# Patient Record
Sex: MALE | Race: WHITE | Employment: OTHER | URBAN - METROPOLITAN AREA
[De-identification: names, ages, dates, MRNs, and addresses within clinical notes are randomized per-mention and may not be internally consistent; named-entity substitution may affect disease eponyms.]

---

## 2017-01-16 ENCOUNTER — OFFICE VISIT (OUTPATIENT)
Dept: PULMONOLOGY | Age: 77
End: 2017-01-16

## 2017-01-16 VITALS
HEART RATE: 70 BPM | DIASTOLIC BLOOD PRESSURE: 80 MMHG | BODY MASS INDEX: 29.71 KG/M2 | HEIGHT: 64 IN | SYSTOLIC BLOOD PRESSURE: 120 MMHG | RESPIRATION RATE: 20 BRPM | OXYGEN SATURATION: 97 % | WEIGHT: 174 LBS

## 2017-01-16 DIAGNOSIS — J44.9 COPD, MILD (HCC): ICD-10-CM

## 2017-01-16 DIAGNOSIS — J45.30 MILD PERSISTENT ASTHMA WITHOUT COMPLICATION: Primary | ICD-10-CM

## 2017-01-16 PROCEDURE — 99213 OFFICE O/P EST LOW 20 MIN: CPT | Performed by: INTERNAL MEDICINE

## 2017-02-20 ENCOUNTER — EPISODE CHANGES (OUTPATIENT)
Dept: CASE MANAGEMENT | Age: 77
End: 2017-02-20

## 2017-02-20 PROBLEM — J45.901 ASTHMA EXACERBATION: Status: ACTIVE | Noted: 2017-02-20

## 2017-03-13 ENCOUNTER — OFFICE VISIT (OUTPATIENT)
Dept: PULMONOLOGY | Age: 77
End: 2017-03-13

## 2017-03-13 VITALS
RESPIRATION RATE: 20 BRPM | HEART RATE: 66 BPM | SYSTOLIC BLOOD PRESSURE: 126 MMHG | DIASTOLIC BLOOD PRESSURE: 68 MMHG | HEIGHT: 64 IN | BODY MASS INDEX: 29.02 KG/M2 | OXYGEN SATURATION: 93 % | WEIGHT: 170 LBS

## 2017-03-13 DIAGNOSIS — J44.9 COPD, MILD (HCC): ICD-10-CM

## 2017-03-13 DIAGNOSIS — J45.30 MILD PERSISTENT ASTHMA WITHOUT COMPLICATION: Primary | ICD-10-CM

## 2017-03-13 PROCEDURE — 99213 OFFICE O/P EST LOW 20 MIN: CPT | Performed by: INTERNAL MEDICINE

## 2017-03-13 RX ORDER — PYRIDOXINE HCL (VITAMIN B6) 100 MG
TABLET ORAL
Status: ON HOLD | COMMUNITY
End: 2018-11-15

## 2017-03-13 RX ORDER — ACETAMINOPHEN 160 MG
TABLET,DISINTEGRATING ORAL
Status: ON HOLD | COMMUNITY
End: 2018-11-15

## 2017-03-24 ENCOUNTER — HOSPITAL ENCOUNTER (OUTPATIENT)
Dept: GENERAL RADIOLOGY | Age: 77
Discharge: HOME | End: 2017-03-24
Attending: FAMILY MEDICINE | Admitting: FAMILY MEDICINE

## 2017-03-24 ENCOUNTER — HOSPITAL ENCOUNTER (OUTPATIENT)
Dept: CT IMAGING | Age: 77
Discharge: HOME | End: 2017-03-24
Attending: FAMILY MEDICINE | Admitting: FAMILY MEDICINE

## 2017-03-24 DIAGNOSIS — R14.0 ABDOMINAL DISTENSION (GASEOUS): ICD-10-CM

## 2017-03-24 DIAGNOSIS — R14.0 ABDOMINAL BLOATING: ICD-10-CM

## 2017-03-24 DIAGNOSIS — R10.31 ABDOMINAL PAIN, RIGHT LOWER QUADRANT: ICD-10-CM

## 2017-03-24 LAB
ANION GAP SERPL CALCULATED.3IONS-SCNC: 16 MMOL/L (ref 4–16)
BUN BLDV-MCNC: 21 MG/DL (ref 6–23)
CALCIUM SERPL-MCNC: 9 MG/DL (ref 8.3–10.6)
CHLORIDE BLD-SCNC: 97 MMOL/L (ref 99–110)
CO2: 27 MMOL/L (ref 21–32)
CREAT SERPL-MCNC: 1.2 MG/DL (ref 0.9–1.3)
GFR AFRICAN AMERICAN: 24 ML/MIN/1.73M2
GFR AFRICAN AMERICAN: >60 ML/MIN/1.73M2
GFR NON-AFRICAN AMERICAN: 20 ML/MIN/1.73M2
GFR NON-AFRICAN AMERICAN: 58 ML/MIN/1.73M2
GLUCOSE BLD-MCNC: 241 MG/DL (ref 70–140)
POC CREATININE: 3.1 MG/DL (ref 0.9–1.3)
POTASSIUM SERPL-SCNC: 3.6 MMOL/L (ref 3.5–5.1)
SODIUM BLD-SCNC: 140 MMOL/L (ref 135–145)

## 2017-03-29 PROBLEM — K44.9 HIATAL HERNIA: Status: ACTIVE | Noted: 2017-03-29

## 2017-04-12 ENCOUNTER — HOSPITAL ENCOUNTER (OUTPATIENT)
Dept: GENERAL RADIOLOGY | Age: 77
Discharge: HOME | End: 2017-04-12
Attending: SPECIALIST | Admitting: SPECIALIST

## 2017-04-12 DIAGNOSIS — K44.9 HIATAL HERNIA: ICD-10-CM

## 2017-04-12 DIAGNOSIS — K44.9 DIAPHRAGMATIC HERNIA WITHOUT OBSTRUCTION OR GANGRENE: ICD-10-CM

## 2017-04-24 ENCOUNTER — HOSPITAL ENCOUNTER (OUTPATIENT)
Dept: PREADMISSION TESTING | Age: 77
Discharge: HOME | End: 2017-04-24
Attending: SPECIALIST | Admitting: SPECIALIST

## 2017-04-24 VITALS
WEIGHT: 165 LBS | TEMPERATURE: 98.4 F | OXYGEN SATURATION: 95 % | DIASTOLIC BLOOD PRESSURE: 70 MMHG | BODY MASS INDEX: 28.17 KG/M2 | RESPIRATION RATE: 16 BRPM | HEIGHT: 64 IN | SYSTOLIC BLOOD PRESSURE: 121 MMHG | HEART RATE: 68 BPM

## 2017-04-24 ASSESSMENT — PAIN SCALES - GENERAL: PAINLEVEL_OUTOF10: 0

## 2017-04-26 ENCOUNTER — HOSPITAL ENCOUNTER (OUTPATIENT)
Dept: SURGERY | Age: 77
Discharge: HOME | End: 2017-04-26
Attending: SPECIALIST | Admitting: SPECIALIST

## 2017-04-26 VITALS
HEART RATE: 63 BPM | OXYGEN SATURATION: 99 % | WEIGHT: 169 LBS | HEIGHT: 64 IN | SYSTOLIC BLOOD PRESSURE: 117 MMHG | DIASTOLIC BLOOD PRESSURE: 82 MMHG | TEMPERATURE: 97 F | BODY MASS INDEX: 28.85 KG/M2 | RESPIRATION RATE: 17 BRPM

## 2017-04-26 RX ORDER — SODIUM CHLORIDE, SODIUM LACTATE, POTASSIUM CHLORIDE, CALCIUM CHLORIDE 600; 310; 30; 20 MG/100ML; MG/100ML; MG/100ML; MG/100ML
INJECTION, SOLUTION INTRAVENOUS CONTINUOUS
Status: DISCONTINUED | OUTPATIENT
Start: 2017-04-26 | End: 2017-04-27 | Stop reason: HOSPADM

## 2017-04-26 RX ADMIN — SODIUM CHLORIDE, SODIUM LACTATE, POTASSIUM CHLORIDE, CALCIUM CHLORIDE: 600; 310; 30; 20 INJECTION, SOLUTION INTRAVENOUS at 10:01

## 2017-04-26 ASSESSMENT — PAIN SCALES - GENERAL
PAINLEVEL_OUTOF10: 0
PAINLEVEL_OUTOF10: 0

## 2017-04-26 ASSESSMENT — PAIN - FUNCTIONAL ASSESSMENT: PAIN_FUNCTIONAL_ASSESSMENT: 0-10

## 2017-06-05 ENCOUNTER — HOSPITAL ENCOUNTER (OUTPATIENT)
Dept: GENERAL RADIOLOGY | Age: 77
Discharge: HOME | End: 2017-06-05
Attending: FAMILY MEDICINE | Admitting: FAMILY MEDICINE

## 2017-06-05 LAB
ANION GAP SERPL CALCULATED.3IONS-SCNC: 10 MMOL/L (ref 4–16)
BUN BLDV-MCNC: 18 MG/DL (ref 6–23)
CALCIUM SERPL-MCNC: 9.5 MG/DL (ref 8.3–10.6)
CHLORIDE BLD-SCNC: 104 MMOL/L (ref 99–110)
CO2: 30 MMOL/L (ref 21–32)
CREAT SERPL-MCNC: 1 MG/DL (ref 0.9–1.3)
GFR AFRICAN AMERICAN: >60 ML/MIN/1.73M2
GFR NON-AFRICAN AMERICAN: >60 ML/MIN/1.73M2
GLUCOSE BLD-MCNC: 97 MG/DL (ref 70–140)
POTASSIUM SERPL-SCNC: 4.2 MMOL/L (ref 3.5–5.1)
SODIUM BLD-SCNC: 144 MMOL/L (ref 135–145)

## 2017-08-28 ENCOUNTER — HOSPITAL ENCOUNTER (OUTPATIENT)
Dept: GENERAL RADIOLOGY | Age: 77
Discharge: HOME | End: 2017-08-28
Attending: FAMILY MEDICINE | Admitting: FAMILY MEDICINE

## 2017-08-28 LAB
ALBUMIN SERPL-MCNC: 4.1 GM/DL (ref 3.4–5)
ALP BLD-CCNC: 82 IU/L (ref 40–128)
ALT SERPL-CCNC: 37 U/L (ref 10–40)
ANION GAP SERPL CALCULATED.3IONS-SCNC: 9 MMOL/L (ref 4–16)
AST SERPL-CCNC: 39 IU/L (ref 15–37)
BILIRUB SERPL-MCNC: 1.1 MG/DL (ref 0–1)
BUN BLDV-MCNC: 16 MG/DL (ref 6–23)
CALCIUM SERPL-MCNC: 9.1 MG/DL (ref 8.3–10.6)
CHLORIDE BLD-SCNC: 102 MMOL/L (ref 99–110)
CHOLESTEROL: 141 MG/DL
CO2: 31 MMOL/L (ref 21–32)
CREAT SERPL-MCNC: 1.1 MG/DL (ref 0.9–1.3)
GFR AFRICAN AMERICAN: >60 ML/MIN/1.73M2
GFR NON-AFRICAN AMERICAN: >60 ML/MIN/1.73M2
GLUCOSE BLD-MCNC: 90 MG/DL (ref 70–140)
HDLC SERPL-MCNC: 64 MG/DL
LDL CHOLESTEROL DIRECT: 73 MG/DL
POTASSIUM SERPL-SCNC: 4.4 MMOL/L (ref 3.5–5.1)
SODIUM BLD-SCNC: 142 MMOL/L (ref 135–145)
TOTAL PROTEIN: 6.1 GM/DL (ref 6.4–8.2)
TRIGL SERPL-MCNC: 84 MG/DL

## 2017-09-07 ENCOUNTER — HOSPITAL ENCOUNTER (OUTPATIENT)
Dept: NEUROLOGY | Age: 77
Discharge: HOME | End: 2017-09-07
Attending: ORTHOPAEDIC SURGERY | Admitting: ORTHOPAEDIC SURGERY

## 2017-09-14 ENCOUNTER — OFFICE VISIT (OUTPATIENT)
Dept: PULMONOLOGY | Age: 77
End: 2017-09-14

## 2017-09-14 VITALS
DIASTOLIC BLOOD PRESSURE: 84 MMHG | SYSTOLIC BLOOD PRESSURE: 126 MMHG | HEIGHT: 64 IN | HEART RATE: 75 BPM | BODY MASS INDEX: 28.6 KG/M2 | WEIGHT: 167.55 LBS | OXYGEN SATURATION: 98 %

## 2017-09-14 DIAGNOSIS — J44.9 COPD, MILD (HCC): Primary | ICD-10-CM

## 2017-09-14 DIAGNOSIS — J45.30 MILD PERSISTENT ASTHMA WITHOUT COMPLICATION: ICD-10-CM

## 2017-09-14 PROCEDURE — 99213 OFFICE O/P EST LOW 20 MIN: CPT | Performed by: INTERNAL MEDICINE

## 2017-11-16 PROBLEM — K85.90 PANCREATITIS, UNSPECIFIED PANCREATITIS TYPE: Status: ACTIVE | Noted: 2017-11-16

## 2018-01-16 ENCOUNTER — OFFICE VISIT (OUTPATIENT)
Dept: PULMONOLOGY | Age: 78
End: 2018-01-16

## 2018-01-16 VITALS — SYSTOLIC BLOOD PRESSURE: 126 MMHG | DIASTOLIC BLOOD PRESSURE: 72 MMHG | HEART RATE: 72 BPM | OXYGEN SATURATION: 98 %

## 2018-01-16 DIAGNOSIS — J44.1 ACUTE EXACERBATION OF CHRONIC OBSTRUCTIVE PULMONARY DISEASE (COPD) (HCC): Primary | ICD-10-CM

## 2018-01-16 DIAGNOSIS — J44.9 COPD, MILD (HCC): ICD-10-CM

## 2018-01-16 DIAGNOSIS — R91.1 LUNG NODULE SEEN ON IMAGING STUDY: ICD-10-CM

## 2018-01-16 DIAGNOSIS — J45.30 MILD PERSISTENT ASTHMA WITHOUT COMPLICATION: ICD-10-CM

## 2018-01-16 PROCEDURE — 99213 OFFICE O/P EST LOW 20 MIN: CPT | Performed by: INTERNAL MEDICINE

## 2018-01-16 RX ORDER — PREDNISONE 1 MG/1
TABLET ORAL
Qty: 38 TABLET | Refills: 0 | Status: ON HOLD | OUTPATIENT
Start: 2018-01-16 | End: 2018-11-15

## 2018-01-16 RX ORDER — DOXYCYCLINE HYCLATE 100 MG
100 TABLET ORAL 2 TIMES DAILY
Qty: 14 TABLET | Refills: 0 | Status: SHIPPED | OUTPATIENT
Start: 2018-01-16 | End: 2018-01-23

## 2018-02-22 ENCOUNTER — NURSE ONLY (OUTPATIENT)
Dept: PULMONOLOGY | Age: 78
End: 2018-02-22

## 2018-02-22 DIAGNOSIS — J45.30 MILD PERSISTENT ASTHMA WITHOUT COMPLICATION: ICD-10-CM

## 2018-02-22 DIAGNOSIS — J44.9 COPD, MILD (HCC): ICD-10-CM

## 2018-02-22 LAB
DLCO %PRED: NORMAL
DLCO PRE: NORMAL
FEF 25-75%-POST: 1.04
FEF 25-75%-PRE: 1.27
FEV1-POST: 1.16
FEV1-PRE: 1.52
FEV1/FVC-POST: 78.4
FEV1/FVC-PRE: 77
FVC-POST: 1.48
FVC-PRE: 1.98
MEP: NORMAL
MIP: NORMAL
TLC %PRED: NORMAL
TLC PRE: NORMAL

## 2018-02-22 PROCEDURE — 94060 EVALUATION OF WHEEZING: CPT | Performed by: INTERNAL MEDICINE

## 2018-02-22 ASSESSMENT — PULMONARY FUNCTION TESTS
FEV1_PRE: 1.52
FEV1/FVC_PRE: 77.0
FVC_POST: 1.48
FEV1/FVC_POST: 78.4
FVC_PRE: 1.98
FEV1_POST: 1.16

## 2018-06-06 ENCOUNTER — HOSPITAL ENCOUNTER (OUTPATIENT)
Dept: ULTRASOUND IMAGING | Age: 78
Discharge: HOME | End: 2018-06-06
Attending: INTERNAL MEDICINE | Admitting: INTERNAL MEDICINE

## 2018-06-06 DIAGNOSIS — I65.23 CAROTID STENOSIS, BILATERAL: ICD-10-CM

## 2018-06-06 DIAGNOSIS — R42 DIZZINESS: ICD-10-CM

## 2018-07-17 ENCOUNTER — OFFICE VISIT (OUTPATIENT)
Dept: PULMONOLOGY | Age: 78
End: 2018-07-17

## 2018-07-17 VITALS
DIASTOLIC BLOOD PRESSURE: 78 MMHG | SYSTOLIC BLOOD PRESSURE: 122 MMHG | BODY MASS INDEX: 29.19 KG/M2 | HEIGHT: 64 IN | WEIGHT: 171 LBS | HEART RATE: 61 BPM | OXYGEN SATURATION: 99 %

## 2018-07-17 DIAGNOSIS — J44.9 COPD, MILD (HCC): ICD-10-CM

## 2018-07-17 DIAGNOSIS — R91.1 LUNG NODULE SEEN ON IMAGING STUDY: ICD-10-CM

## 2018-07-17 DIAGNOSIS — R06.02 SOB (SHORTNESS OF BREATH): ICD-10-CM

## 2018-07-17 DIAGNOSIS — J45.30 MILD PERSISTENT ASTHMA WITHOUT COMPLICATION: Primary | ICD-10-CM

## 2018-07-17 PROCEDURE — 99213 OFFICE O/P EST LOW 20 MIN: CPT | Performed by: INTERNAL MEDICINE

## 2018-07-17 RX ORDER — MOMETASONE FUROATE 50 UG/1
1 SPRAY, METERED NASAL
COMMUNITY
End: 2018-07-17

## 2018-07-17 RX ORDER — TAMSULOSIN HYDROCHLORIDE 0.4 MG/1
0.4 CAPSULE ORAL
Status: ON HOLD | COMMUNITY
End: 2018-11-15

## 2018-07-17 RX ORDER — ALBUTEROL SULFATE 90 UG/1
2 AEROSOL, METERED RESPIRATORY (INHALATION)
COMMUNITY
End: 2018-07-17

## 2018-07-17 NOTE — PROGRESS NOTES
nodule. This dictation was performed with a verbal recognition program and it was checked for errors. It is possible that there are still dictated errors within this office note. Any errors should be brought immediately to my attention for correction. All efforts were made to ensure that this office note is accurate.

## 2018-07-25 ENCOUNTER — HOSPITAL ENCOUNTER (OUTPATIENT)
Dept: CT IMAGING | Age: 78
Discharge: HOME | End: 2018-07-25
Attending: INTERNAL MEDICINE | Admitting: INTERNAL MEDICINE

## 2018-07-25 DIAGNOSIS — R91.8 LUNG NODULES: ICD-10-CM

## 2018-07-25 DIAGNOSIS — R91.1 SOLITARY PULMONARY NODULE: ICD-10-CM

## 2018-07-31 ENCOUNTER — TELEPHONE (OUTPATIENT)
Dept: PULMONOLOGY | Age: 78
End: 2018-07-31

## 2018-08-01 ENCOUNTER — HOSPITAL ENCOUNTER (OUTPATIENT)
Dept: GENERAL RADIOLOGY | Age: 78
Discharge: HOME | End: 2018-08-01
Attending: FAMILY MEDICINE | Admitting: FAMILY MEDICINE

## 2018-08-01 LAB
ALBUMIN SERPL-MCNC: 4.1 GM/DL (ref 3.4–5)
ALP BLD-CCNC: 67 IU/L (ref 40–129)
ALT SERPL-CCNC: 26 U/L (ref 10–40)
ANION GAP SERPL CALCULATED.3IONS-SCNC: 13 MMOL/L (ref 4–16)
AST SERPL-CCNC: 31 IU/L (ref 15–37)
BASOPHILS ABSOLUTE: 0 K/CU MM
BASOPHILS RELATIVE PERCENT: 0.6 % (ref 0–1)
BILIRUB SERPL-MCNC: 0.7 MG/DL (ref 0–1)
BUN BLDV-MCNC: 16 MG/DL (ref 6–23)
CALCIUM SERPL-MCNC: 8.7 MG/DL (ref 8.3–10.6)
CHLORIDE BLD-SCNC: 101 MMOL/L (ref 99–110)
CHOLESTEROL: 134 MG/DL
CO2: 27 MMOL/L (ref 21–32)
CREAT SERPL-MCNC: 0.9 MG/DL (ref 0.9–1.3)
DIFFERENTIAL TYPE: ABNORMAL
EOSINOPHILS ABSOLUTE: 0.2 K/CU MM
EOSINOPHILS RELATIVE PERCENT: 3.4 % (ref 0–3)
FOLATE: >20 NG/ML (ref 3.1–17.5)
GFR AFRICAN AMERICAN: >60 ML/MIN/1.73M2
GFR NON-AFRICAN AMERICAN: >60 ML/MIN/1.73M2
GLUCOSE BLD-MCNC: 97 MG/DL (ref 70–99)
HCT VFR BLD CALC: 45.7 % (ref 42–52)
HDLC SERPL-MCNC: 58 MG/DL
HEMOGLOBIN: 15.1 GM/DL (ref 13.5–18)
IMMATURE NEUTROPHIL %: 0.3 % (ref 0–0.43)
LDL CHOLESTEROL DIRECT: 72 MG/DL
LYMPHOCYTES ABSOLUTE: 1.8 K/CU MM
LYMPHOCYTES RELATIVE PERCENT: 28.4 % (ref 24–44)
MCH RBC QN AUTO: 30.8 PG (ref 27–31)
MCHC RBC AUTO-ENTMCNC: 33 % (ref 32–36)
MCV RBC AUTO: 93.3 FL (ref 78–100)
MONOCYTES ABSOLUTE: 0.8 K/CU MM
MONOCYTES RELATIVE PERCENT: 12.2 % (ref 0–4)
NUCLEATED RBC %: 0 %
PDW BLD-RTO: 12.6 % (ref 11.7–14.9)
PLATELET # BLD: 164 K/CU MM (ref 140–440)
PMV BLD AUTO: 10.1 FL (ref 7.5–11.1)
POTASSIUM SERPL-SCNC: 4.2 MMOL/L (ref 3.5–5.1)
RBC # BLD: 4.9 M/CU MM (ref 4.6–6.2)
SEGMENTED NEUTROPHILS ABSOLUTE COUNT: 3.5 K/CU MM
SEGMENTED NEUTROPHILS RELATIVE PERCENT: 55.1 % (ref 36–66)
SODIUM BLD-SCNC: 141 MMOL/L (ref 135–145)
TOTAL IMMATURE NEUTOROPHIL: 0.02 K/CU MM
TOTAL NUCLEATED RBC: 0 K/CU MM
TOTAL PROTEIN: 6 GM/DL (ref 6.4–8.2)
TRIGL SERPL-MCNC: 94 MG/DL
TSH HIGH SENSITIVITY: 1.55 UIU/ML (ref 0.27–4.2)
VITAMIN B-12: 689.9 PG/ML (ref 211–911)
WBC # BLD: 6.4 K/CU MM (ref 4–10.5)

## 2018-08-06 ENCOUNTER — TELEPHONE (OUTPATIENT)
Dept: PULMONOLOGY | Age: 78
End: 2018-08-06

## 2018-11-14 ENCOUNTER — HOSPITAL ENCOUNTER (OUTPATIENT)
Age: 78
Setting detail: OBSERVATION
Discharge: HOME | End: 2018-11-16
Attending: EMERGENCY MEDICINE | Admitting: FAMILY MEDICINE
Payer: COMMERCIAL

## 2018-11-14 ENCOUNTER — APPOINTMENT (OUTPATIENT)
Dept: GENERAL RADIOLOGY | Age: 78
End: 2018-11-14
Payer: COMMERCIAL

## 2018-11-14 ENCOUNTER — APPOINTMENT (OUTPATIENT)
Dept: CT IMAGING | Age: 78
End: 2018-11-14
Payer: COMMERCIAL

## 2018-11-14 DIAGNOSIS — R53.1 GENERAL WEAKNESS: Primary | ICD-10-CM

## 2018-11-14 DIAGNOSIS — R40.4 TRANSIENT ALTERATION OF AWARENESS: ICD-10-CM

## 2018-11-14 LAB
ALBUMIN SERPL-MCNC: 4 GM/DL (ref 3.4–5)
ALP BLD-CCNC: 62 IU/L (ref 40–129)
ALT SERPL-CCNC: 32 U/L (ref 10–40)
ANION GAP SERPL CALCULATED.3IONS-SCNC: 9 MMOL/L (ref 4–16)
AST SERPL-CCNC: 38 IU/L (ref 15–37)
BASOPHILS ABSOLUTE: 0.1 K/CU MM
BASOPHILS RELATIVE PERCENT: 0.8 % (ref 0–1)
BILIRUB SERPL-MCNC: 1.3 MG/DL (ref 0–1)
BUN BLDV-MCNC: 14 MG/DL (ref 6–23)
CALCIUM SERPL-MCNC: 8.8 MG/DL (ref 8.3–10.6)
CHLORIDE BLD-SCNC: 102 MMOL/L (ref 99–110)
CO2: 26 MMOL/L (ref 21–32)
CREAT SERPL-MCNC: 0.9 MG/DL (ref 0.9–1.3)
DIFFERENTIAL TYPE: ABNORMAL
EOSINOPHILS ABSOLUTE: 0.2 K/CU MM
EOSINOPHILS RELATIVE PERCENT: 2.7 % (ref 0–3)
GFR AFRICAN AMERICAN: >60 ML/MIN/1.73M2
GFR NON-AFRICAN AMERICAN: >60 ML/MIN/1.73M2
GLUCOSE BLD-MCNC: 87 MG/DL (ref 70–99)
HCT VFR BLD CALC: 52 % (ref 42–52)
HEMOGLOBIN: 15.8 GM/DL (ref 13.5–18)
IMMATURE NEUTROPHIL %: 0.3 % (ref 0–0.43)
INR BLD: 1.09 INDEX
LYMPHOCYTES ABSOLUTE: 2.9 K/CU MM
LYMPHOCYTES RELATIVE PERCENT: 37.3 % (ref 24–44)
MCH RBC QN AUTO: 30.6 PG (ref 27–31)
MCHC RBC AUTO-ENTMCNC: 30.4 % (ref 32–36)
MCV RBC AUTO: 100.8 FL (ref 78–100)
MONOCYTES ABSOLUTE: 1 K/CU MM
MONOCYTES RELATIVE PERCENT: 13.3 % (ref 0–4)
NUCLEATED RBC %: 0 %
PDW BLD-RTO: 12.8 % (ref 11.7–14.9)
PLATELET # BLD: 161 K/CU MM (ref 140–440)
PMV BLD AUTO: 9.1 FL (ref 7.5–11.1)
POTASSIUM SERPL-SCNC: 4 MMOL/L (ref 3.5–5.1)
PROTHROMBIN TIME: 12.4 SECONDS (ref 9.12–12.5)
RBC # BLD: 5.16 M/CU MM (ref 4.6–6.2)
SEGMENTED NEUTROPHILS ABSOLUTE COUNT: 3.5 K/CU MM
SEGMENTED NEUTROPHILS RELATIVE PERCENT: 45.6 % (ref 36–66)
SODIUM BLD-SCNC: 137 MMOL/L (ref 135–145)
TOTAL IMMATURE NEUTOROPHIL: 0.02 K/CU MM
TOTAL NUCLEATED RBC: 0 K/CU MM
TOTAL PROTEIN: 6.5 GM/DL (ref 6.4–8.2)
TROPONIN T: <0.01 NG/ML
WBC # BLD: 7.7 K/CU MM (ref 4–10.5)

## 2018-11-14 PROCEDURE — 84484 ASSAY OF TROPONIN QUANT: CPT

## 2018-11-14 PROCEDURE — 71045 X-RAY EXAM CHEST 1 VIEW: CPT

## 2018-11-14 PROCEDURE — 93005 ELECTROCARDIOGRAM TRACING: CPT | Performed by: EMERGENCY MEDICINE

## 2018-11-14 PROCEDURE — 99285 EMERGENCY DEPT VISIT HI MDM: CPT

## 2018-11-14 PROCEDURE — 70450 CT HEAD/BRAIN W/O DYE: CPT

## 2018-11-14 PROCEDURE — 80053 COMPREHEN METABOLIC PANEL: CPT

## 2018-11-14 PROCEDURE — 85610 PROTHROMBIN TIME: CPT

## 2018-11-14 PROCEDURE — 85025 COMPLETE CBC W/AUTO DIFF WBC: CPT

## 2018-11-14 RX ORDER — ALPRAZOLAM 0.25 MG/1
0.25 TABLET ORAL NIGHTLY PRN
Status: ON HOLD | COMMUNITY
End: 2019-03-05 | Stop reason: SDUPTHER

## 2018-11-15 ENCOUNTER — APPOINTMENT (OUTPATIENT)
Dept: MRI IMAGING | Age: 78
End: 2018-11-15
Payer: COMMERCIAL

## 2018-11-15 PROBLEM — G45.9 TIA (TRANSIENT ISCHEMIC ATTACK): Status: ACTIVE | Noted: 2018-11-15

## 2018-11-15 PROCEDURE — 97161 PT EVAL LOW COMPLEX 20 MIN: CPT

## 2018-11-15 PROCEDURE — G0378 HOSPITAL OBSERVATION PER HR: HCPCS

## 2018-11-15 PROCEDURE — 6370000000 HC RX 637 (ALT 250 FOR IP): Performed by: FAMILY MEDICINE

## 2018-11-15 PROCEDURE — G8979 MOBILITY GOAL STATUS: HCPCS

## 2018-11-15 PROCEDURE — 2580000003 HC RX 258: Performed by: FAMILY MEDICINE

## 2018-11-15 PROCEDURE — 97530 THERAPEUTIC ACTIVITIES: CPT

## 2018-11-15 PROCEDURE — 99205 OFFICE O/P NEW HI 60 MIN: CPT | Performed by: PSYCHIATRY & NEUROLOGY

## 2018-11-15 PROCEDURE — 93010 ELECTROCARDIOGRAM REPORT: CPT | Performed by: INTERNAL MEDICINE

## 2018-11-15 PROCEDURE — 95819 EEG AWAKE AND ASLEEP: CPT

## 2018-11-15 PROCEDURE — 2580000003 HC RX 258: Performed by: NURSE PRACTITIONER

## 2018-11-15 PROCEDURE — 94640 AIRWAY INHALATION TREATMENT: CPT

## 2018-11-15 PROCEDURE — 6370000000 HC RX 637 (ALT 250 FOR IP): Performed by: NURSE PRACTITIONER

## 2018-11-15 PROCEDURE — G8978 MOBILITY CURRENT STATUS: HCPCS

## 2018-11-15 PROCEDURE — 6360000002 HC RX W HCPCS: Performed by: NURSE PRACTITIONER

## 2018-11-15 PROCEDURE — 96374 THER/PROPH/DIAG INJ IV PUSH: CPT

## 2018-11-15 PROCEDURE — 70551 MRI BRAIN STEM W/O DYE: CPT

## 2018-11-15 RX ORDER — SODIUM CHLORIDE 0.9 % (FLUSH) 0.9 %
10 SYRINGE (ML) INJECTION EVERY 12 HOURS SCHEDULED
Status: DISCONTINUED | OUTPATIENT
Start: 2018-11-15 | End: 2018-11-16 | Stop reason: HOSPADM

## 2018-11-15 RX ORDER — FLUTICASONE FUROATE AND VILANTEROL 100; 25 UG/1; UG/1
1 POWDER RESPIRATORY (INHALATION) DAILY
Status: DISCONTINUED | OUTPATIENT
Start: 2018-11-15 | End: 2018-11-15 | Stop reason: CLARIF

## 2018-11-15 RX ORDER — ALPRAZOLAM 0.25 MG/1
0.25 TABLET ORAL NIGHTLY PRN
Status: DISCONTINUED | OUTPATIENT
Start: 2018-11-15 | End: 2018-11-16 | Stop reason: HOSPADM

## 2018-11-15 RX ORDER — FEXOFENADINE HCL 180 MG/1
180 TABLET ORAL DAILY
Status: DISCONTINUED | OUTPATIENT
Start: 2018-11-15 | End: 2018-11-15 | Stop reason: CLARIF

## 2018-11-15 RX ORDER — PANTOPRAZOLE SODIUM 40 MG/1
40 TABLET, DELAYED RELEASE ORAL DAILY
Status: DISCONTINUED | OUTPATIENT
Start: 2018-11-15 | End: 2018-11-16 | Stop reason: HOSPADM

## 2018-11-15 RX ORDER — SODIUM CHLORIDE 0.9 % (FLUSH) 0.9 %
10 SYRINGE (ML) INJECTION PRN
Status: DISCONTINUED | OUTPATIENT
Start: 2018-11-15 | End: 2018-11-16 | Stop reason: HOSPADM

## 2018-11-15 RX ORDER — ACETAMINOPHEN 325 MG/1
650 TABLET ORAL EVERY 4 HOURS PRN
Status: DISCONTINUED | OUTPATIENT
Start: 2018-11-15 | End: 2018-11-16 | Stop reason: HOSPADM

## 2018-11-15 RX ORDER — ALBUTEROL SULFATE 90 UG/1
2 AEROSOL, METERED RESPIRATORY (INHALATION) EVERY 4 HOURS PRN
Status: DISCONTINUED | OUTPATIENT
Start: 2018-11-15 | End: 2018-11-16 | Stop reason: HOSPADM

## 2018-11-15 RX ORDER — ASPIRIN 81 MG/1
81 TABLET, CHEWABLE ORAL DAILY
Status: DISCONTINUED | OUTPATIENT
Start: 2018-11-15 | End: 2018-11-16 | Stop reason: HOSPADM

## 2018-11-15 RX ORDER — MONTELUKAST SODIUM 10 MG/1
10 TABLET ORAL NIGHTLY
Status: DISCONTINUED | OUTPATIENT
Start: 2018-11-15 | End: 2018-11-16 | Stop reason: HOSPADM

## 2018-11-15 RX ORDER — ONDANSETRON 2 MG/ML
4 INJECTION INTRAMUSCULAR; INTRAVENOUS EVERY 6 HOURS PRN
Status: DISCONTINUED | OUTPATIENT
Start: 2018-11-15 | End: 2018-11-16 | Stop reason: HOSPADM

## 2018-11-15 RX ORDER — CETIRIZINE HYDROCHLORIDE 10 MG/1
10 TABLET ORAL DAILY
Status: DISCONTINUED | OUTPATIENT
Start: 2018-11-15 | End: 2018-11-16 | Stop reason: HOSPADM

## 2018-11-15 RX ORDER — LATANOPROST 50 UG/ML
1 SOLUTION/ DROPS OPHTHALMIC NIGHTLY
Status: DISCONTINUED | OUTPATIENT
Start: 2018-11-15 | End: 2018-11-16 | Stop reason: HOSPADM

## 2018-11-15 RX ORDER — ATORVASTATIN CALCIUM 40 MG/1
40 TABLET, FILM COATED ORAL DAILY
Status: DISCONTINUED | OUTPATIENT
Start: 2018-11-15 | End: 2018-11-16 | Stop reason: HOSPADM

## 2018-11-15 RX ORDER — LEVETIRACETAM 500 MG/1
500 TABLET ORAL 2 TIMES DAILY
Status: DISCONTINUED | OUTPATIENT
Start: 2018-11-15 | End: 2018-11-16 | Stop reason: HOSPADM

## 2018-11-15 RX ORDER — FUROSEMIDE 40 MG/1
40 TABLET ORAL DAILY
Status: DISCONTINUED | OUTPATIENT
Start: 2018-11-15 | End: 2018-11-16 | Stop reason: HOSPADM

## 2018-11-15 RX ADMIN — PANTOPRAZOLE SODIUM 40 MG: 40 TABLET, DELAYED RELEASE ORAL at 06:06

## 2018-11-15 RX ADMIN — ACETAMINOPHEN 650 MG: 325 TABLET ORAL at 02:27

## 2018-11-15 RX ADMIN — LEVETIRACETAM 1500 MG: 500 INJECTION, SOLUTION, CONCENTRATE INTRAVENOUS at 16:00

## 2018-11-15 RX ADMIN — Medication 2 PUFF: at 20:17

## 2018-11-15 RX ADMIN — CETIRIZINE HYDROCHLORIDE 10 MG: 10 TABLET, FILM COATED ORAL at 10:38

## 2018-11-15 RX ADMIN — ATORVASTATIN CALCIUM 40 MG: 40 TABLET, FILM COATED ORAL at 22:10

## 2018-11-15 RX ADMIN — MONTELUKAST SODIUM 10 MG: 10 TABLET, COATED ORAL at 22:10

## 2018-11-15 RX ADMIN — SODIUM CHLORIDE, PRESERVATIVE FREE 10 ML: 5 INJECTION INTRAVENOUS at 22:10

## 2018-11-15 RX ADMIN — ALPRAZOLAM 0.25 MG: 0.25 TABLET ORAL at 22:13

## 2018-11-15 RX ADMIN — ASPIRIN 81 MG 81 MG: 81 TABLET ORAL at 10:39

## 2018-11-15 RX ADMIN — ALPRAZOLAM 0.25 MG: 0.25 TABLET ORAL at 02:27

## 2018-11-15 RX ADMIN — APIXABAN 2.5 MG: 2.5 TABLET, FILM COATED ORAL at 22:10

## 2018-11-15 RX ADMIN — LEVETIRACETAM 500 MG: 500 TABLET, FILM COATED ORAL at 22:10

## 2018-11-15 RX ADMIN — SODIUM CHLORIDE, PRESERVATIVE FREE 10 ML: 5 INJECTION INTRAVENOUS at 10:38

## 2018-11-15 RX ADMIN — FUROSEMIDE 40 MG: 40 TABLET ORAL at 10:38

## 2018-11-15 RX ADMIN — LATANOPROST 1 DROP: 50 SOLUTION/ DROPS OPHTHALMIC at 22:10

## 2018-11-15 RX ADMIN — APIXABAN 2.5 MG: 2.5 TABLET, FILM COATED ORAL at 02:27

## 2018-11-15 RX ADMIN — MONTELUKAST SODIUM 10 MG: 10 TABLET, COATED ORAL at 02:27

## 2018-11-15 RX ADMIN — APIXABAN 2.5 MG: 2.5 TABLET, FILM COATED ORAL at 10:39

## 2018-11-15 ASSESSMENT — PAIN SCALES - GENERAL
PAINLEVEL_OUTOF10: 0
PAINLEVEL_OUTOF10: 0
PAINLEVEL_OUTOF10: 5
PAINLEVEL_OUTOF10: 5

## 2018-11-15 ASSESSMENT — PAIN DESCRIPTION - PAIN TYPE: TYPE: ACUTE PAIN

## 2018-11-15 ASSESSMENT — PAIN DESCRIPTION - LOCATION: LOCATION: HEAD

## 2018-11-15 NOTE — CARE COORDINATION
Patient discharged home on 11/16/2018  Discharge time out complete:   PHILLIP Sanders 4     Needs addressed:     [x] Yes [] No       Do you have any medical equipment at home? [] Yes [x] No   Do you have any other medical equipment needs? [] Yes [x] No   Do you use  [] Home O2  [] BIPAP/CPAP  [] Med Neb              [x] None of the above    All functioning correctly   [] Yes [] No   Do you have all supplies needed   [] Yes [] No   Which company supplies your equipment-    Do you have [facility]:   [] Yes [x] No   Who?:    Discharge plan reviewed:    [x] Yes [] No   Any Needs:   [] Yes [x] No     Medications:  Educated on medications:   [x] Yes [] No   Medications filled by our pharmacy:   [] Yes [x] No   Changes explained:   [x] Yes [] No   Medication Reconciliation complete:   [x] Yes [] No   Do you have all your existing medications:    [x] Yes [] No   Discharge plan and destination agreed upon:   [x] Yes [] No        F/U made: Date and time placed in AVS and Patient aware   [x] Yes [] No   Do you have transportation to these appointments available?:   [x] Yes [] No        CN met with patient at 1045 on 11/15/2018, sitting in chair. States feels better. Used to see Dr Valentino Hardin back in 2013 when he had his strokes and Dr Valentino Hardin told him he was having TIA or mini strokes all the time. Will continue to FU    STROKE INITIAL ASSESSMENT      What symptoms brought you in to the hospital? Patient came in with per family heard them cry out but not yelling, when she checked on him he had a glazed look in his eyes. Family member then went to bed and checked on him later in morning around 10 AM since he did not get up. Patient was weak, difficulty swallowing and speaking. Patient recalls knowing what was going on but couldn't speak. Slept for 10 hours then got up and went to chair and was unable to eat. Then started to drink fluids.  Was able to walk to kitchen and eat soup then decided to come in to the hospital. Has a history of 2 strokes. Last Known Well:11/14 0200    Do you have a Neurologist? Loretta Khoury  Name:    Where do you live:       [x] Home       [] Independent Living     [] Assisted Living                  [] [address]   [] Homeless/Homeless Shelter   [] Group Home    Primary Physician:  [] [facility]   []Sia   [x] Dr. Lukasz Damon   [] [facility]   [] PA/NP    Pharmacy:        [] [facility]        [] [facility]        [] [facility] N[facility]   [x] [facility]      [] Joleen Rosado    [] [facility]   [] Joshha Munch              [] Nursing Home    [] [place] [id number]   [] [facility]   [] [facility]    [] [facility]   [] [facility]   [] [employer] [facility]   [] Other:          Meds to Beds:   [x] Yes  [] No  Home Care:         [] Yes, Name:       [x] No            SNF:  [] Yes, Name:   [x] No      Do you take a blood thinner? Aspirin, Eliquis  Do you take a statins? Lipitor    Are you out of any of your  home medications? [] Yes   [x] No  Can you pay for your meds? [x] Yes   [] No  Do you have transportation:            [x] Yes   [] No          What time do you prefer your appointments:  [] AM   [x] PM  [] Anytime    Goals for this admission:   Patient wants to: go home with friend    Steps to meet goal:   1. Medications   2. Work with therapy   3.        CN provided education to patient on reducing risk for stroke/TIA by modifying /controlling risk factors:. AFib, Stroke, HLD,HTN, PVD, . Family History of Heart Disease. .. Instructed to take the medications as prescribed and dont stop unless ordered by a physician. Educated on  need to follow-up with physician after discharge . Discussed benefits of eating a healthy diet, regularly exercising.        Copy of educational materials given to the patient/caregiver to take home, reviewed signs and symptoms of stroke, including sudden numbness, dizziness, or loss of coordination or balance; weakness on one side of the body, sudden confusion, difficulty speaking, understanding or swallowing, sudden loss of vision, or severe, sudden headache. Emphasized the need to call 911 immediately if they are experiencing signs and symptoms of stroke. FAST education provided, FAST magnet given to patient. All education with teachback and questions answered     CN Contact information card given to patient/caregiver    Dysphagia screen done prior to any oral intake (including meds)                                  Yes/No    Date & Time of screening-11/15 0016  Date & time of first medication-11/15 0227  Did the pt fail the dysphagia screen? If yes,call the physician for SLP order, make the patient NPO and change oral medications to other routes  No    VTE Prophylaxis given by day 2 of admission ( day 1 starts on adm date,this excludes obs status and ED)  Yes  If VTE not ordered, did the physician chart BOTH a pharmacological and mechanical reasons ( in context to VTE) why it wan not ordered? NA  Was the patient given an antithrombotic by end of day 2? (day 1 starts on patients arrival date)  Yes  If the pt did not have an order for antithrombotic by day 2, did the physician document why the pt did not receive it? ( NPO status and an allergy to an antithrombotic are not acceptable reasons)  NA    Did the pt receive education on how to call 911 and documented that handout was given to pt/caregiver? Yes  Did the pt receive education on stroke symptoms and documented that handout was given to pt/caregiver? Yes  Did the patient receive education on risk factors for stroke and documented that handout was given to pt/caregiver? Yes  Does the pt have the personalized stroke factors checklist added the the AVS with appropriate risk factors checked? Yes    Did the pt receive a list of medications that are DC'D on the AVS ?  Yes  Was the pt assessed by PT/OT or SLP?  If not is there a physician note that pt is back to baseline,no PT/OT/SLP eval needed or if there is an outpt referral. PT/OT phone

## 2018-11-15 NOTE — CONSULTS
hemodynamically significant coronary artery disease    H/O cardiovascular stress test 1/14/2016    lexiscan-normal,EF70%    H/O echocardiogram 02/12/15    EF 60% Mildly hypertrophied LV and LV systolic function. Mild MR & TR. Atrial septal occluder device is seen with is fuctioning normally.  H/O echocardiogram 1/14/2016    EF 60% aaortic root mildly dilated with dimension of 4.3    H/O transesophageal echocardiography (HARLAN) for monitoring 5/22/2014    normal septal occluder     Hiatal hernia     planning to see Dr Nava Sees about this    History of 24 hour EKG monitoring 5/10/14    Sinus rhythm.  History of cardiovascular stress test 12/16/2013 12/2013-([facility])-Probably normal Regadenoson/Exercise with Tc-99 sestamibi. No ischemia noted. Small fixed defect in anteroseptuma and a second small fixed defect in inferolateral wall, most consistent with attenuation artifact. Normal LV size. Global LVSF normal and normal wall motion;    History of echocardiogram 11/11/2013 11/2013-([facility])-Interatrial septal occluder device is visualized. Normal biventricular systolic function; LVEF - 60-65%. Mild degenerative valvular changes; Mild AI, Mild MR, trace TR, trace PI. No pericardial effusion;    History of heart surgery 11/13    PFO closure by Amplatzer 25mm Cribriform device to intra-atrial septum--> [employer])    History of kidney stones     tx with surgery for this in the past    History of nuclear stress test 2/16/15    EF 70%. WNL    Hx of Doppler ultrasound 5/7/2014    Carotid- right and left normal    Hx of Doppler ultrasound 6/30/14    Right groin US: Negative for pseudoaneurysm findings.     Hx of echocardiogram 3/31/2014    mild mitral and tricusid regurg, mildly hypertrophied left ventricle    Hx of motion sickness     and claustrophobia    HX OTHER MEDICAL 2/18/2014-2/24/2014 2/2014-([facility] 7 day Event Monitor- Five

## 2018-11-15 NOTE — H&P
180 tablet, Rfl: 3    acetaminophen 650 MG TABS, Take 650 mg by mouth every 4 hours as needed, Disp: 120 tablet, Rfl: 3    montelukast (SINGULAIR) 10 MG tablet, Take 10 mg by mouth nightly., Disp: , Rfl:     tamsulosin (FLOMAX) 0.4 MG capsule, Take 0.4 mg by mouth, Disp: , Rfl:     hydrOXYzine (VISTARIL) 25 MG capsule, Take 1 capsule by mouth 3 times daily as needed for Anxiety, Disp: 30 capsule, Rfl: 0    predniSONE (DELTASONE) 5 MG tablet, Take 6 PO now, 5 PO qam for 2 days, 3 PO qam for 3 days, 2 PO qam for 4 days, 1 PO qam for 5 days, then STOP, Disp: 38 tablet, Rfl: 0    HYDROcodone-acetaminophen (NORCO) 5-325 MG per tablet, Take 1 tablet by mouth every 6 hours as needed for Pain ., Disp: , Rfl:     terazosin (HYTRIN) 10 MG capsule, Take 10 mg by mouth nightly., Disp: , Rfl:     pantoprazole (PROTONIX) 40 MG tablet, Take 40 mg by mouth daily. On med list of Dr Anjana Hernandez dated 2/13/2014, Disp: , Rfl:     History of present illness     Chief Complaint: weakness    Nakia Godinez is a [de-identified] y.o.  male  who presents with weakness. Per family member at bedside pt woke up screaming and in distress at 0200am yesterday 11/14. He had weakness in all of this extremities and felt very fatigued. He states he has had 2 strokes in the past and his neurologist told him he has many mini-strokes. Pt states he was reading his symptoms online today and thought he may have had another stroke so decided to come in for evaluation.      Review of Systems : Ten point ROS reviewed and negative, unless as noted above per HPI       Objective:   No intake or output data in the 24 hours ending 11/15/18 0007   Vitals:   Vitals:    11/14/18 2350   BP:    Pulse: 61   Resp: 13   Temp:    SpO2: 95%     Physical Exam:   Gen:  Seems tired otherwise normal.  Head/Eyes:  Normocephalic atraumatic, EOMI   NECK:   symmetrical, trachea midline  LUNGS: Normal Effort   CARDIOVASCULAR:  Normal rate  ABDOMEN: Non tender, non distended, no HSM

## 2018-11-16 VITALS
WEIGHT: 170.5 LBS | TEMPERATURE: 98 F | OXYGEN SATURATION: 97 % | HEIGHT: 64 IN | HEART RATE: 72 BPM | RESPIRATION RATE: 18 BRPM | DIASTOLIC BLOOD PRESSURE: 59 MMHG | BODY MASS INDEX: 29.11 KG/M2 | SYSTOLIC BLOOD PRESSURE: 106 MMHG

## 2018-11-16 LAB
CHOLESTEROL: 136 MG/DL
HCT VFR BLD CALC: 47 % (ref 42–52)
HDLC SERPL-MCNC: 53 MG/DL
HEMOGLOBIN: 15.4 GM/DL (ref 13.5–18)
LDL CHOLESTEROL DIRECT: 71 MG/DL
MCH RBC QN AUTO: 30.7 PG (ref 27–31)
MCHC RBC AUTO-ENTMCNC: 32.8 % (ref 32–36)
MCV RBC AUTO: 93.8 FL (ref 78–100)
PDW BLD-RTO: 12.8 % (ref 11.7–14.9)
PLATELET # BLD: 162 K/CU MM (ref 140–440)
PMV BLD AUTO: 9.6 FL (ref 7.5–11.1)
RBC # BLD: 5.01 M/CU MM (ref 4.6–6.2)
TRIGL SERPL-MCNC: 137 MG/DL
WBC # BLD: 6.4 K/CU MM (ref 4–10.5)

## 2018-11-16 PROCEDURE — 36415 COLL VENOUS BLD VENIPUNCTURE: CPT

## 2018-11-16 PROCEDURE — 85027 COMPLETE CBC AUTOMATED: CPT

## 2018-11-16 PROCEDURE — 83721 ASSAY OF BLOOD LIPOPROTEIN: CPT

## 2018-11-16 PROCEDURE — G0378 HOSPITAL OBSERVATION PER HR: HCPCS

## 2018-11-16 PROCEDURE — 94640 AIRWAY INHALATION TREATMENT: CPT

## 2018-11-16 PROCEDURE — 94761 N-INVAS EAR/PLS OXIMETRY MLT: CPT

## 2018-11-16 PROCEDURE — 2580000003 HC RX 258: Performed by: FAMILY MEDICINE

## 2018-11-16 PROCEDURE — 99215 OFFICE O/P EST HI 40 MIN: CPT | Performed by: NURSE PRACTITIONER

## 2018-11-16 PROCEDURE — 80061 LIPID PANEL: CPT

## 2018-11-16 PROCEDURE — 6370000000 HC RX 637 (ALT 250 FOR IP): Performed by: NURSE PRACTITIONER

## 2018-11-16 PROCEDURE — 6370000000 HC RX 637 (ALT 250 FOR IP): Performed by: FAMILY MEDICINE

## 2018-11-16 RX ORDER — LEVETIRACETAM 500 MG/1
500 TABLET ORAL 2 TIMES DAILY
Qty: 60 TABLET | Refills: 3 | Status: ON HOLD | OUTPATIENT
Start: 2018-11-16 | End: 2019-03-05 | Stop reason: HOSPADM

## 2018-11-16 RX ADMIN — LEVETIRACETAM 500 MG: 500 TABLET, FILM COATED ORAL at 08:50

## 2018-11-16 RX ADMIN — FUROSEMIDE 40 MG: 40 TABLET ORAL at 08:50

## 2018-11-16 RX ADMIN — CETIRIZINE HYDROCHLORIDE 10 MG: 10 TABLET, FILM COATED ORAL at 08:51

## 2018-11-16 RX ADMIN — SODIUM CHLORIDE, PRESERVATIVE FREE 10 ML: 5 INJECTION INTRAVENOUS at 08:51

## 2018-11-16 RX ADMIN — ASPIRIN 81 MG 81 MG: 81 TABLET ORAL at 08:50

## 2018-11-16 RX ADMIN — APIXABAN 2.5 MG: 2.5 TABLET, FILM COATED ORAL at 08:50

## 2018-11-16 RX ADMIN — PANTOPRAZOLE SODIUM 40 MG: 40 TABLET, DELAYED RELEASE ORAL at 05:56

## 2018-11-16 RX ADMIN — Medication 2 PUFF: at 09:00

## 2018-11-16 NOTE — DISCHARGE INSTR - OTHER ORDERS
Follow-up With  Details  Why  Contact Kahlil Reyes MD  Go on 11/19/2018  Follow up @ 11:30 AM  Jrodin Pichardo [address] [place] [address] Mirian  81., DO  Go on 11/27/2018  Follow up @ 11:20 at [address] [place] [address]

## 2018-11-16 NOTE — PROGRESS NOTES
Neurology Service Progress Note  [facility]   Patient Name: Nakia Godinez  : 1940        Subjective:   Patient seen and examined   Patient has not had any repeat seizure activities  We had a long discussion about seizure control and follow up  I want him to have long term seizure monitoring in our office  Denies CP and SOB     :     Medications:  Scheduled Meds:   apixaban  2.5 mg Oral BID    aspirin  81 mg Oral Daily    atorvastatin  40 mg Oral Daily    pantoprazole  40 mg Oral Daily    montelukast  10 mg Oral Nightly    furosemide  40 mg Oral Daily    sodium chloride flush  10 mL Intravenous 2 times per day    cetirizine  10 mg Oral Daily    mometasone-formoterol  2 puff Inhalation BID    latanoprost  1 drop Both Eyes Nightly    levETIRAcetam  500 mg Oral BID     Continuous Infusions:  PRN Meds:.acetaminophen, albuterol sulfate HFA, ALPRAZolam, sodium chloride flush, magnesium hydroxide, ondansetron        Review of Symptoms:    10-point system review completed. All of which are negative except as mentioned above. Physical Exam:        Gen: A&O x 4, NAD, cooperative  HEENT: NC/AT, EOMI, PERRL, mmm, no carotid bruits, neck supple, no meningeal signs; Fundoscopic: no disc edema appreciated  Heart: Regular   Lungs: no distress  Ext: no edema, no calf tenderness b/l  Psych: normal mood and affect  Skin: no rashes or lesions    NEUROLOGIC EXAM:    Mental Status: A&O to self, location, month and year, NAD, speech clear, language fluent, repetition and naming intact, follows commands appropriately    Cranial Nerve Exam:   CN II-XII:  PERRL, VFF, no nystagmus, no gaze paresis, sensation V1-V3 intact b/l, muscles of facial expression symmetric; hearing intact to conversational tone, palate elevates symmetrically, shoulder elevation symmetric and tongue protrudes midline with movement side to side.     Motor Exam:       Strength 5/5 UE's/LE's b/l  Tone and bulk normal   No

## 2018-11-16 NOTE — DISCHARGE SUMMARY
Discharge Summary    Name:  Sangeeta Dugan /Age/Sex: 1940  ([de-identified] y.o. male)   MRN & CSN:  [id number] & [id number] Admission Date/Time: 2018 10:20 PM   Attending:  Juliana Russo MD Discharging Physician: Juliana Russo MD     HPI:   As per admission H&P    \"Xochitl Conroy is a [de-identified] y.o.  male  who presents with weakness. Per family member at bedside pt woke up screaming and in distress at 0200am yesterday . He had weakness in all of this extremities and felt very fatigued. He states he has had 2 strokes in the past and his neurologist told him he has many mini-strokes. Pt states he was reading his symptoms online today and thought he may have had another stroke so decided to come in for evaluation\". Hospital Course:   Sangeeta Dugan is a [de-identified] y.o.  male  who presents 24 Hours after an episode of altered mental status, and inability to move both his arms and legs. Possible seizure  MRI negative for acute findings, showed area of encephalomalacia  Evaluated by neurology  Started on Keppra  instructed to follow up with neurology, appointment given  Patient is stable for discharge    The patient expressed appropriate understanding of and agreement with the discharge recommendations, medications, and plan.      Consults this admission:  IP CONSULT TO HOSPITALIST  IP CONSULT TO NEUROLOGY    Discharge Instruction:   Follow up appointments:   Primary care physician:  within 2 weeks    Diet:  regular diet   Activity: activity as tolerated  Disposition: Discharged to:   [x]Home, []C, []SNF, []Acute Rehab, []Hospice   Condition on discharge: Stable    Discharge Medications:      Farooq Curtis   Home Medication Instructions DOTTIE:[id number]    Printed on:18 9178   Medication Information                      acetaminophen 650 MG TABS  Take 650 mg by mouth every 4 hours as needed             albuterol sulfate (PROAIR RESPICLICK) 616 (90 BASE) MCG/ACT aerosol powder inhalation  Inhale 2 puffs into the 11/16/18   0403   NA  137   --    K  4.0   --    CL  102   --    CO2  26   --    BUN  14   --    CREATININE  0.9   --    WBC  7.7  6.4   HCT  52.0  47.0   PLT  161  162       IMAGING:    MRI brain without contrast [[id number]] Collected: 11/15/18 0948      Order Status: Completed Updated: 11/15/18 1508     Narrative:       EXAMINATION:  MRI OF THE BRAIN WITHOUT CONTRAST  11/15/2018 9:25 am    TECHNIQUE:  Multiplanar multisequence MRI of the brain was performed without the  administration of intravenous contrast.    COMPARISON:  Head CT 11/14/2018, MRI 11/30/2016    HISTORY:  ORDERING SYSTEM PROVIDED HISTORY: tia r/o cva  TECHNOLOGIST PROVIDED HISTORY:  Ordering Physician Provided Reason for Exam: extremity weakness, TIA, r/o CVA  Acuity: Acute  Type of Exam: Initial    FINDINGS:  INTRACRANIAL STRUCTURES/VENTRICLES: No diffusion restriction to suggest acute  infarction. No mass effect or midline shift. No evidence of an acute  intracranial hemorrhage. Diffuse parenchymal volume loss with enlargement of  the ventricles and cerebral sulci. Mild periventricular and subcortical  white matter T2/FLAIR hyperintense signal.  Focal encephalomalacia in the  left superior parietal lobe is unchanged. The sellar/suprasellar regions  appear unremarkable. The normal signal voids within the major intracranial  vessels appear maintained. ORBITS: The visualized portion of the orbits demonstrate no acute abnormality. SINUSES: The visualized paranasal sinuses and mastoid air cells are well  aerated. BONES/SOFT TISSUES: The bone marrow signal intensity appears normal. The soft  tissues demonstrate no acute abnormality. Impression:       1. No acute intracranial abnormality. Specifically, no acute infarction. 2. Mild parenchymal volume loss. Minimal chronic microvascular ischemic  changes. Stable focal encephalomalacia in the high left parietal lobe.        MRI BRAIN W CONTRAST [[id number]]      Order Status: is a mild dextroscoliosis of the thoracic spine. Surgical anchors are present within the right humeral head. Impression:       No acute abnormality detected.                Discharge Time of 35 minutes    Electronically signed by Simi Epstein MD on 11/16/2018 at 2:23 PM

## 2018-11-16 NOTE — PLAN OF CARE
Problem: Pain:  Goal: Pain level will decrease  Pain level will decrease   Outcome: Ongoing    Goal: Control of acute pain  Control of acute pain   Outcome: Ongoing    Goal: Control of chronic pain  Control of chronic pain   Outcome: Ongoing      Problem: HEMODYNAMIC STATUS  Goal: Patient has stable vital signs and fluid balance  Outcome: Ongoing      Problem: ACTIVITY INTOLERANCE/IMPAIRED MOBILITY  Goal: Mobility/activity is maintained at optimum level for patient  Outcome: Ongoing      Problem: COMMUNICATION IMPAIRMENT  Goal: Ability to express needs and understand communication  Outcome: Ongoing

## 2018-11-16 NOTE — PLAN OF CARE
Problem: Pain:  Goal: Pain level will decrease  Pain level will decrease   Outcome: Met This Shift    Goal: Control of acute pain  Control of acute pain   Outcome: Met This Shift    Goal: Control of chronic pain  Control of chronic pain   Outcome: Met This Shift      Problem: HEMODYNAMIC STATUS  Goal: Patient has stable vital signs and fluid balance  Outcome: Ongoing

## 2018-11-17 LAB
EKG ATRIAL RATE: 59 BPM
EKG DIAGNOSIS: NORMAL
EKG P AXIS: 42 DEGREES
EKG P-R INTERVAL: 186 MS
EKG Q-T INTERVAL: 424 MS
EKG QRS DURATION: 102 MS
EKG QTC CALCULATION (BAZETT): 419 MS
EKG R AXIS: -39 DEGREES
EKG T AXIS: -7 DEGREES
EKG VENTRICULAR RATE: 59 BPM

## 2019-01-22 ENCOUNTER — OFFICE VISIT (OUTPATIENT)
Dept: PULMONOLOGY | Age: 79
End: 2019-01-22
Payer: MEDICARE

## 2019-01-22 VITALS
HEIGHT: 63 IN | SYSTOLIC BLOOD PRESSURE: 114 MMHG | HEART RATE: 75 BPM | WEIGHT: 170 LBS | OXYGEN SATURATION: 97 % | DIASTOLIC BLOOD PRESSURE: 74 MMHG | BODY MASS INDEX: 30.12 KG/M2 | RESPIRATION RATE: 18 BRPM

## 2019-01-22 DIAGNOSIS — J44.9 COPD, MILD (HCC): ICD-10-CM

## 2019-01-22 DIAGNOSIS — R06.02 SOB (SHORTNESS OF BREATH): ICD-10-CM

## 2019-01-22 DIAGNOSIS — J45.30 MILD PERSISTENT ASTHMA WITHOUT COMPLICATION: Primary | ICD-10-CM

## 2019-01-22 PROCEDURE — 99213 OFFICE O/P EST LOW 20 MIN: CPT | Performed by: INTERNAL MEDICINE

## 2019-02-25 ENCOUNTER — APPOINTMENT (OUTPATIENT)
Dept: GENERAL RADIOLOGY | Age: 79
DRG: 069 | End: 2019-02-25
Payer: MEDICARE

## 2019-02-25 ENCOUNTER — APPOINTMENT (OUTPATIENT)
Dept: CT IMAGING | Age: 79
DRG: 069 | End: 2019-02-25
Payer: MEDICARE

## 2019-02-25 ENCOUNTER — HOSPITAL ENCOUNTER (INPATIENT)
Age: 79
LOS: 8 days | Discharge: NURSING, REHAB OR LONG-TERM CARE | DRG: 069 | End: 2019-03-05
Attending: EMERGENCY MEDICINE | Admitting: INTERNAL MEDICINE
Payer: MEDICARE

## 2019-02-25 DIAGNOSIS — F41.9 ANXIETY: ICD-10-CM

## 2019-02-25 DIAGNOSIS — R41.82 ALTERED MENTAL STATUS, UNSPECIFIED ALTERED MENTAL STATUS TYPE: Primary | ICD-10-CM

## 2019-02-25 LAB
ALBUMIN SERPL-MCNC: 4 GM/DL (ref 3.4–5)
ALBUMIN SERPL-MCNC: 4.3 GM/DL (ref 3.4–5)
ALCOHOL SCREEN SERUM: <0.01 %WT/VOL
ALP BLD-CCNC: 59 IU/L (ref 40–128)
ALP BLD-CCNC: 60 IU/L (ref 40–129)
ALT SERPL-CCNC: 26 U/L (ref 10–40)
ALT SERPL-CCNC: 27 U/L (ref 10–40)
AMMONIA: 65 UMOL/L (ref 16–60)
ANION GAP SERPL CALCULATED.3IONS-SCNC: 12 MMOL/L (ref 4–16)
ANION GAP SERPL CALCULATED.3IONS-SCNC: 13 MMOL/L (ref 4–16)
AST SERPL-CCNC: 31 IU/L (ref 15–37)
AST SERPL-CCNC: 34 IU/L (ref 15–37)
BACTERIA: ABNORMAL /HPF
BASE EXCESS MIXED: 1.8 (ref 0–1.2)
BASOPHILS ABSOLUTE: 0 K/CU MM
BASOPHILS ABSOLUTE: 0 K/CU MM
BASOPHILS RELATIVE PERCENT: 0.3 % (ref 0–1)
BASOPHILS RELATIVE PERCENT: 0.4 % (ref 0–1)
BILIRUB SERPL-MCNC: 1.4 MG/DL (ref 0–1)
BILIRUB SERPL-MCNC: 1.6 MG/DL (ref 0–1)
BILIRUBIN DIRECT: 0.4 MG/DL (ref 0–0.3)
BILIRUBIN URINE: NEGATIVE MG/DL
BILIRUBIN, INDIRECT: 1.2 MG/DL (ref 0–0.7)
BLOOD, URINE: NEGATIVE
BUN BLDV-MCNC: 15 MG/DL (ref 6–23)
BUN BLDV-MCNC: 17 MG/DL (ref 6–23)
CALCIUM SERPL-MCNC: 9.1 MG/DL (ref 8.3–10.6)
CALCIUM SERPL-MCNC: 9.2 MG/DL (ref 8.3–10.6)
CHLORIDE BLD-SCNC: 103 MMOL/L (ref 99–110)
CHLORIDE BLD-SCNC: 103 MMOL/L (ref 99–110)
CLARITY: CLEAR
CO2: 24 MMOL/L (ref 21–32)
CO2: 24 MMOL/L (ref 21–32)
COLOR: YELLOW
CREAT SERPL-MCNC: 0.9 MG/DL (ref 0.9–1.3)
CREAT SERPL-MCNC: 0.9 MG/DL (ref 0.9–1.3)
DIFFERENTIAL TYPE: ABNORMAL
DIFFERENTIAL TYPE: ABNORMAL
EOSINOPHILS ABSOLUTE: 0 K/CU MM
EOSINOPHILS ABSOLUTE: 0.1 K/CU MM
EOSINOPHILS RELATIVE PERCENT: 0.2 % (ref 0–3)
EOSINOPHILS RELATIVE PERCENT: 1.2 % (ref 0–3)
GFR AFRICAN AMERICAN: >60 ML/MIN/1.73M2
GFR AFRICAN AMERICAN: >60 ML/MIN/1.73M2
GFR NON-AFRICAN AMERICAN: >60 ML/MIN/1.73M2
GFR NON-AFRICAN AMERICAN: >60 ML/MIN/1.73M2
GLUCOSE BLD-MCNC: 93 MG/DL (ref 70–99)
GLUCOSE BLD-MCNC: 93 MG/DL (ref 70–99)
GLUCOSE, URINE: NEGATIVE MG/DL
HCO3 VENOUS: 25.7 MMOL/L (ref 19–25)
HCT VFR BLD CALC: 50.5 % (ref 42–52)
HCT VFR BLD CALC: 51.7 % (ref 42–52)
HEMOGLOBIN: 16.6 GM/DL (ref 13.5–18)
HEMOGLOBIN: 16.8 GM/DL (ref 13.5–18)
IMMATURE NEUTROPHIL %: 0.3 % (ref 0–0.43)
IMMATURE NEUTROPHIL %: 0.4 % (ref 0–0.43)
KETONES, URINE: ABNORMAL MG/DL
LACTATE: 1 MMOL/L (ref 0.4–2)
LACTATE: 1.1 MMOL/L (ref 0.4–2)
LEUKOCYTE ESTERASE, URINE: NEGATIVE
LYMPHOCYTES ABSOLUTE: 1.1 K/CU MM
LYMPHOCYTES ABSOLUTE: 2.2 K/CU MM
LYMPHOCYTES RELATIVE PERCENT: 29.8 % (ref 24–44)
LYMPHOCYTES RELATIVE PERCENT: 9.9 % (ref 24–44)
MAGNESIUM: 1.9 MG/DL (ref 1.8–2.4)
MCH RBC QN AUTO: 30.3 PG (ref 27–31)
MCH RBC QN AUTO: 30.3 PG (ref 27–31)
MCHC RBC AUTO-ENTMCNC: 32.1 % (ref 32–36)
MCHC RBC AUTO-ENTMCNC: 33.3 % (ref 32–36)
MCV RBC AUTO: 91.2 FL (ref 78–100)
MCV RBC AUTO: 94.5 FL (ref 78–100)
MONOCYTES ABSOLUTE: 0.7 K/CU MM
MONOCYTES ABSOLUTE: 0.8 K/CU MM
MONOCYTES RELATIVE PERCENT: 6.9 % (ref 0–4)
MONOCYTES RELATIVE PERCENT: 9.7 % (ref 0–4)
MUCUS: ABNORMAL HPF
NITRITE URINE, QUANTITATIVE: NEGATIVE
NUCLEATED RBC %: 0 %
NUCLEATED RBC %: 0 %
O2 SAT, VEN: 93 % (ref 50–70)
PCO2, VEN: 37 MMHG (ref 38–52)
PDW BLD-RTO: 12.4 % (ref 11.7–14.9)
PDW BLD-RTO: 12.4 % (ref 11.7–14.9)
PH VENOUS: 7.45 (ref 7.32–7.42)
PH, URINE: 6 (ref 5–8)
PLATELET # BLD: 152 K/CU MM (ref 140–440)
PLATELET # BLD: 166 K/CU MM (ref 140–440)
PMV BLD AUTO: 9.6 FL (ref 7.5–11.1)
PMV BLD AUTO: 9.7 FL (ref 7.5–11.1)
PO2, VEN: 158 MMHG (ref 28–48)
POTASSIUM SERPL-SCNC: 4 MMOL/L (ref 3.5–5.1)
POTASSIUM SERPL-SCNC: 4.1 MMOL/L (ref 3.5–5.1)
PROTEIN UA: NEGATIVE MG/DL
RBC # BLD: 5.47 M/CU MM (ref 4.6–6.2)
RBC # BLD: 5.54 M/CU MM (ref 4.6–6.2)
RBC URINE: 5 /HPF (ref 0–3)
SEGMENTED NEUTROPHILS ABSOLUTE COUNT: 4.4 K/CU MM
SEGMENTED NEUTROPHILS ABSOLUTE COUNT: 9.2 K/CU MM
SEGMENTED NEUTROPHILS RELATIVE PERCENT: 58.6 % (ref 36–66)
SEGMENTED NEUTROPHILS RELATIVE PERCENT: 82.3 % (ref 36–66)
SODIUM BLD-SCNC: 139 MMOL/L (ref 135–145)
SODIUM BLD-SCNC: 140 MMOL/L (ref 135–145)
SPECIFIC GRAVITY UA: 1.02 (ref 1–1.03)
TOTAL IMMATURE NEUTOROPHIL: 0.02 K/CU MM
TOTAL IMMATURE NEUTOROPHIL: 0.05 K/CU MM
TOTAL NUCLEATED RBC: 0 K/CU MM
TOTAL NUCLEATED RBC: 0 K/CU MM
TOTAL PROTEIN: 6.1 GM/DL (ref 6.4–8.2)
TOTAL PROTEIN: 6.5 GM/DL (ref 6.4–8.2)
TRICHOMONAS: ABNORMAL /HPF
TROPONIN T: <0.01 NG/ML
UROBILINOGEN, URINE: NORMAL MG/DL (ref 0.2–1)
WBC # BLD: 11.2 K/CU MM (ref 4–10.5)
WBC # BLD: 7.5 K/CU MM (ref 4–10.5)
WBC UA: ABNORMAL /HPF (ref 0–2)

## 2019-02-25 PROCEDURE — 82805 BLOOD GASES W/O2 SATURATION: CPT

## 2019-02-25 PROCEDURE — G0378 HOSPITAL OBSERVATION PER HR: HCPCS

## 2019-02-25 PROCEDURE — 6370000000 HC RX 637 (ALT 250 FOR IP): Performed by: INTERNAL MEDICINE

## 2019-02-25 PROCEDURE — 36415 COLL VENOUS BLD VENIPUNCTURE: CPT

## 2019-02-25 PROCEDURE — 71045 X-RAY EXAM CHEST 1 VIEW: CPT

## 2019-02-25 PROCEDURE — 99285 EMERGENCY DEPT VISIT HI MDM: CPT

## 2019-02-25 PROCEDURE — 84484 ASSAY OF TROPONIN QUANT: CPT

## 2019-02-25 PROCEDURE — 81001 URINALYSIS AUTO W/SCOPE: CPT

## 2019-02-25 PROCEDURE — 70450 CT HEAD/BRAIN W/O DYE: CPT

## 2019-02-25 PROCEDURE — 93010 ELECTROCARDIOGRAM REPORT: CPT | Performed by: INTERNAL MEDICINE

## 2019-02-25 PROCEDURE — 82248 BILIRUBIN DIRECT: CPT

## 2019-02-25 PROCEDURE — 71250 CT THORAX DX C-: CPT

## 2019-02-25 PROCEDURE — 2580000003 HC RX 258: Performed by: INTERNAL MEDICINE

## 2019-02-25 PROCEDURE — 93005 ELECTROCARDIOGRAM TRACING: CPT | Performed by: EMERGENCY MEDICINE

## 2019-02-25 PROCEDURE — G0480 DRUG TEST DEF 1-7 CLASSES: HCPCS

## 2019-02-25 PROCEDURE — 85025 COMPLETE CBC W/AUTO DIFF WBC: CPT

## 2019-02-25 PROCEDURE — 87086 URINE CULTURE/COLONY COUNT: CPT

## 2019-02-25 PROCEDURE — 83735 ASSAY OF MAGNESIUM: CPT

## 2019-02-25 PROCEDURE — 82140 ASSAY OF AMMONIA: CPT

## 2019-02-25 PROCEDURE — 80053 COMPREHEN METABOLIC PANEL: CPT

## 2019-02-25 PROCEDURE — 1200000000 HC SEMI PRIVATE

## 2019-02-25 PROCEDURE — 83605 ASSAY OF LACTIC ACID: CPT

## 2019-02-25 RX ORDER — ATORVASTATIN CALCIUM 40 MG/1
40 TABLET, FILM COATED ORAL NIGHTLY
Status: DISCONTINUED | OUTPATIENT
Start: 2019-02-25 | End: 2019-03-05 | Stop reason: HOSPADM

## 2019-02-25 RX ORDER — SODIUM CHLORIDE, SODIUM LACTATE, POTASSIUM CHLORIDE, CALCIUM CHLORIDE 600; 310; 30; 20 MG/100ML; MG/100ML; MG/100ML; MG/100ML
INJECTION, SOLUTION INTRAVENOUS CONTINUOUS
Status: DISCONTINUED | OUTPATIENT
Start: 2019-02-25 | End: 2019-02-28

## 2019-02-25 RX ORDER — CETIRIZINE HYDROCHLORIDE 10 MG/1
5 TABLET ORAL DAILY
Status: DISCONTINUED | OUTPATIENT
Start: 2019-02-25 | End: 2019-03-05 | Stop reason: HOSPADM

## 2019-02-25 RX ORDER — ALBUTEROL SULFATE 90 UG/1
2 AEROSOL, METERED RESPIRATORY (INHALATION) EVERY 6 HOURS PRN
Status: DISCONTINUED | OUTPATIENT
Start: 2019-02-25 | End: 2019-03-05 | Stop reason: HOSPADM

## 2019-02-25 RX ORDER — ASPIRIN 81 MG/1
81 TABLET, CHEWABLE ORAL DAILY
Status: DISCONTINUED | OUTPATIENT
Start: 2019-02-25 | End: 2019-03-05 | Stop reason: HOSPADM

## 2019-02-25 RX ORDER — LATANOPROST 50 UG/ML
1 SOLUTION/ DROPS OPHTHALMIC NIGHTLY
Status: DISCONTINUED | OUTPATIENT
Start: 2019-02-25 | End: 2019-03-05 | Stop reason: HOSPADM

## 2019-02-25 RX ORDER — MONTELUKAST SODIUM 10 MG/1
10 TABLET ORAL NIGHTLY
Status: DISCONTINUED | OUTPATIENT
Start: 2019-02-25 | End: 2019-03-05 | Stop reason: HOSPADM

## 2019-02-25 RX ORDER — TAMSULOSIN HYDROCHLORIDE 0.4 MG/1
0.4 CAPSULE ORAL NIGHTLY
Status: ON HOLD | COMMUNITY
End: 2019-03-05 | Stop reason: HOSPADM

## 2019-02-25 RX ORDER — TAMSULOSIN HYDROCHLORIDE 0.4 MG/1
0.4 CAPSULE ORAL NIGHTLY
Status: DISCONTINUED | OUTPATIENT
Start: 2019-02-25 | End: 2019-02-26

## 2019-02-25 RX ORDER — ACETAMINOPHEN 325 MG/1
650 TABLET ORAL EVERY 4 HOURS PRN
Status: DISCONTINUED | OUTPATIENT
Start: 2019-02-25 | End: 2019-03-05 | Stop reason: HOSPADM

## 2019-02-25 RX ORDER — POTASSIUM CHLORIDE 750 MG/1
10 TABLET, EXTENDED RELEASE ORAL DAILY
Status: ON HOLD | COMMUNITY
End: 2019-03-05 | Stop reason: HOSPADM

## 2019-02-25 RX ORDER — ALPRAZOLAM 0.25 MG/1
0.25 TABLET ORAL NIGHTLY PRN
Status: DISCONTINUED | OUTPATIENT
Start: 2019-02-25 | End: 2019-03-05 | Stop reason: HOSPADM

## 2019-02-25 RX ADMIN — LATANOPROST 1 DROP: 50 SOLUTION OPHTHALMIC at 20:40

## 2019-02-25 RX ADMIN — SODIUM CHLORIDE, POTASSIUM CHLORIDE, SODIUM LACTATE AND CALCIUM CHLORIDE: 600; 310; 30; 20 INJECTION, SOLUTION INTRAVENOUS at 17:20

## 2019-02-26 ENCOUNTER — APPOINTMENT (OUTPATIENT)
Dept: MRI IMAGING | Age: 79
DRG: 069 | End: 2019-02-26
Payer: MEDICARE

## 2019-02-26 LAB
AMMONIA: 33 UMOL/L (ref 16–60)
ERYTHROCYTE SEDIMENTATION RATE: 5 MM/HR (ref 0–20)
FOLATE: >20 NG/ML (ref 3.1–17.5)
T4 FREE: 1.37 NG/DL (ref 0.9–1.8)
TSH HIGH SENSITIVITY: 1.35 UIU/ML (ref 0.27–4.2)
VITAMIN B-12: 904.3 PG/ML (ref 211–911)

## 2019-02-26 PROCEDURE — 94664 DEMO&/EVAL PT USE INHALER: CPT

## 2019-02-26 PROCEDURE — 97166 OT EVAL MOD COMPLEX 45 MIN: CPT

## 2019-02-26 PROCEDURE — 97162 PT EVAL MOD COMPLEX 30 MIN: CPT

## 2019-02-26 PROCEDURE — 85652 RBC SED RATE AUTOMATED: CPT

## 2019-02-26 PROCEDURE — 97116 GAIT TRAINING THERAPY: CPT

## 2019-02-26 PROCEDURE — 36415 COLL VENOUS BLD VENIPUNCTURE: CPT

## 2019-02-26 PROCEDURE — 1200000000 HC SEMI PRIVATE

## 2019-02-26 PROCEDURE — 97535 SELF CARE MNGMENT TRAINING: CPT

## 2019-02-26 PROCEDURE — 70551 MRI BRAIN STEM W/O DYE: CPT

## 2019-02-26 PROCEDURE — 84443 ASSAY THYROID STIM HORMONE: CPT

## 2019-02-26 PROCEDURE — 6360000002 HC RX W HCPCS: Performed by: HOSPITALIST

## 2019-02-26 PROCEDURE — 96365 THER/PROPH/DIAG IV INF INIT: CPT

## 2019-02-26 PROCEDURE — 82746 ASSAY OF FOLIC ACID SERUM: CPT

## 2019-02-26 PROCEDURE — G0378 HOSPITAL OBSERVATION PER HR: HCPCS

## 2019-02-26 PROCEDURE — 97530 THERAPEUTIC ACTIVITIES: CPT

## 2019-02-26 PROCEDURE — 94640 AIRWAY INHALATION TREATMENT: CPT

## 2019-02-26 PROCEDURE — 2580000003 HC RX 258: Performed by: INTERNAL MEDICINE

## 2019-02-26 PROCEDURE — 2580000003 HC RX 258: Performed by: PSYCHIATRY & NEUROLOGY

## 2019-02-26 PROCEDURE — 6360000002 HC RX W HCPCS: Performed by: PSYCHIATRY & NEUROLOGY

## 2019-02-26 PROCEDURE — 94761 N-INVAS EAR/PLS OXIMETRY MLT: CPT

## 2019-02-26 PROCEDURE — 84439 ASSAY OF FREE THYROXINE: CPT

## 2019-02-26 PROCEDURE — 92610 EVALUATE SWALLOWING FUNCTION: CPT

## 2019-02-26 PROCEDURE — C9254 INJECTION, LACOSAMIDE: HCPCS | Performed by: PSYCHIATRY & NEUROLOGY

## 2019-02-26 PROCEDURE — 96375 TX/PRO/DX INJ NEW DRUG ADDON: CPT

## 2019-02-26 PROCEDURE — 6370000000 HC RX 637 (ALT 250 FOR IP): Performed by: INTERNAL MEDICINE

## 2019-02-26 PROCEDURE — 94150 VITAL CAPACITY TEST: CPT

## 2019-02-26 PROCEDURE — 82140 ASSAY OF AMMONIA: CPT

## 2019-02-26 PROCEDURE — 82607 VITAMIN B-12: CPT

## 2019-02-26 RX ORDER — KETOROLAC TROMETHAMINE 30 MG/ML
15 INJECTION, SOLUTION INTRAMUSCULAR; INTRAVENOUS ONCE
Status: COMPLETED | OUTPATIENT
Start: 2019-02-26 | End: 2019-02-26

## 2019-02-26 RX ORDER — ONDANSETRON 2 MG/ML
4 INJECTION INTRAMUSCULAR; INTRAVENOUS EVERY 6 HOURS PRN
Status: DISCONTINUED | OUTPATIENT
Start: 2019-02-26 | End: 2019-03-05 | Stop reason: HOSPADM

## 2019-02-26 RX ORDER — ACETAMINOPHEN 80 MG
TABLET,CHEWABLE ORAL
Status: COMPLETED
Start: 2019-02-26 | End: 2019-02-26

## 2019-02-26 RX ORDER — LACOSAMIDE 10 MG/ML
50 INJECTION, SOLUTION INTRAVENOUS 2 TIMES DAILY
Status: DISCONTINUED | OUTPATIENT
Start: 2019-02-26 | End: 2019-02-26 | Stop reason: ALTCHOICE

## 2019-02-26 RX ORDER — PANTOPRAZOLE SODIUM 40 MG/1
40 TABLET, DELAYED RELEASE ORAL
Status: DISCONTINUED | OUTPATIENT
Start: 2019-02-27 | End: 2019-03-05 | Stop reason: HOSPADM

## 2019-02-26 RX ADMIN — Medication 2 PUFF: at 20:07

## 2019-02-26 RX ADMIN — APIXABAN 2.5 MG: 2.5 TABLET, FILM COATED ORAL at 12:00

## 2019-02-26 RX ADMIN — CETIRIZINE HYDROCHLORIDE 5 MG: 10 TABLET, FILM COATED ORAL at 12:00

## 2019-02-26 RX ADMIN — ALBUTEROL SULFATE 2 PUFF: 90 AEROSOL, METERED RESPIRATORY (INHALATION) at 00:27

## 2019-02-26 RX ADMIN — ALPRAZOLAM 0.25 MG: 0.25 TABLET ORAL at 20:34

## 2019-02-26 RX ADMIN — ACETAMINOPHEN 650 MG: 325 TABLET ORAL at 20:32

## 2019-02-26 RX ADMIN — Medication 2 PUFF: at 08:33

## 2019-02-26 RX ADMIN — Medication: at 12:04

## 2019-02-26 RX ADMIN — DEXTROSE MONOHYDRATE 50 MG: 50 INJECTION, SOLUTION INTRAVENOUS at 16:22

## 2019-02-26 RX ADMIN — LATANOPROST 1 DROP: 50 SOLUTION OPHTHALMIC at 20:36

## 2019-02-26 RX ADMIN — ATORVASTATIN CALCIUM 40 MG: 40 TABLET, FILM COATED ORAL at 20:33

## 2019-02-26 RX ADMIN — APIXABAN 2.5 MG: 2.5 TABLET, FILM COATED ORAL at 20:33

## 2019-02-26 RX ADMIN — KETOROLAC TROMETHAMINE 15 MG: 30 INJECTION, SOLUTION INTRAMUSCULAR; INTRAVENOUS at 04:41

## 2019-02-26 RX ADMIN — SERTRALINE HYDROCHLORIDE 50 MG: 50 TABLET ORAL at 17:21

## 2019-02-26 RX ADMIN — ASPIRIN 81 MG 81 MG: 81 TABLET ORAL at 12:00

## 2019-02-26 RX ADMIN — MONTELUKAST SODIUM 10 MG: 10 TABLET, FILM COATED ORAL at 20:33

## 2019-02-26 RX ADMIN — SODIUM CHLORIDE, POTASSIUM CHLORIDE, SODIUM LACTATE AND CALCIUM CHLORIDE: 600; 310; 30; 20 INJECTION, SOLUTION INTRAVENOUS at 08:30

## 2019-02-26 ASSESSMENT — PAIN SCALES - GENERAL
PAINLEVEL_OUTOF10: 0
PAINLEVEL_OUTOF10: 5
PAINLEVEL_OUTOF10: 0

## 2019-02-27 ENCOUNTER — APPOINTMENT (OUTPATIENT)
Dept: ULTRASOUND IMAGING | Age: 79
DRG: 069 | End: 2019-02-27
Payer: MEDICARE

## 2019-02-27 LAB
CULTURE: NORMAL
Lab: NORMAL
REPORT STATUS: NORMAL
SPECIMEN: NORMAL

## 2019-02-27 PROCEDURE — 94640 AIRWAY INHALATION TREATMENT: CPT

## 2019-02-27 PROCEDURE — C9254 INJECTION, LACOSAMIDE: HCPCS | Performed by: PSYCHIATRY & NEUROLOGY

## 2019-02-27 PROCEDURE — 2580000003 HC RX 258: Performed by: PSYCHIATRY & NEUROLOGY

## 2019-02-27 PROCEDURE — 2580000003 HC RX 258: Performed by: INTERNAL MEDICINE

## 2019-02-27 PROCEDURE — 6360000002 HC RX W HCPCS: Performed by: PSYCHIATRY & NEUROLOGY

## 2019-02-27 PROCEDURE — G0378 HOSPITAL OBSERVATION PER HR: HCPCS

## 2019-02-27 PROCEDURE — 1200000000 HC SEMI PRIVATE

## 2019-02-27 PROCEDURE — 6370000000 HC RX 637 (ALT 250 FOR IP): Performed by: HOSPITALIST

## 2019-02-27 PROCEDURE — 94761 N-INVAS EAR/PLS OXIMETRY MLT: CPT

## 2019-02-27 PROCEDURE — 76705 ECHO EXAM OF ABDOMEN: CPT

## 2019-02-27 PROCEDURE — 6370000000 HC RX 637 (ALT 250 FOR IP): Performed by: INTERNAL MEDICINE

## 2019-02-27 PROCEDURE — 96376 TX/PRO/DX INJ SAME DRUG ADON: CPT

## 2019-02-27 RX ADMIN — LATANOPROST 1 DROP: 50 SOLUTION OPHTHALMIC at 21:52

## 2019-02-27 RX ADMIN — ASPIRIN 81 MG 81 MG: 81 TABLET ORAL at 10:15

## 2019-02-27 RX ADMIN — MONTELUKAST SODIUM 10 MG: 10 TABLET, FILM COATED ORAL at 21:45

## 2019-02-27 RX ADMIN — SODIUM CHLORIDE, POTASSIUM CHLORIDE, SODIUM LACTATE AND CALCIUM CHLORIDE: 600; 310; 30; 20 INJECTION, SOLUTION INTRAVENOUS at 00:40

## 2019-02-27 RX ADMIN — DEXTROSE MONOHYDRATE 50 MG: 50 INJECTION, SOLUTION INTRAVENOUS at 02:16

## 2019-02-27 RX ADMIN — SODIUM CHLORIDE, POTASSIUM CHLORIDE, SODIUM LACTATE AND CALCIUM CHLORIDE: 600; 310; 30; 20 INJECTION, SOLUTION INTRAVENOUS at 14:55

## 2019-02-27 RX ADMIN — DEXTROSE MONOHYDRATE 50 MG: 50 INJECTION, SOLUTION INTRAVENOUS at 16:26

## 2019-02-27 RX ADMIN — APIXABAN 2.5 MG: 2.5 TABLET, FILM COATED ORAL at 10:15

## 2019-02-27 RX ADMIN — APIXABAN 2.5 MG: 2.5 TABLET, FILM COATED ORAL at 21:45

## 2019-02-27 RX ADMIN — SERTRALINE HYDROCHLORIDE 50 MG: 50 TABLET ORAL at 10:15

## 2019-02-27 RX ADMIN — ATORVASTATIN CALCIUM 40 MG: 40 TABLET, FILM COATED ORAL at 21:45

## 2019-02-27 RX ADMIN — PANTOPRAZOLE SODIUM 40 MG: 40 TABLET, DELAYED RELEASE ORAL at 06:18

## 2019-02-27 RX ADMIN — Medication 2 PUFF: at 08:09

## 2019-02-27 RX ADMIN — CETIRIZINE HYDROCHLORIDE 5 MG: 10 TABLET, FILM COATED ORAL at 10:15

## 2019-02-27 ASSESSMENT — PAIN SCALES - GENERAL
PAINLEVEL_OUTOF10: 0

## 2019-02-28 LAB
ALBUMIN SERPL-MCNC: 3.9 GM/DL (ref 3.4–5)
ALP BLD-CCNC: 64 IU/L (ref 40–129)
ALT SERPL-CCNC: 23 U/L (ref 10–40)
AMMONIA: 23 UMOL/L (ref 16–60)
ANION GAP SERPL CALCULATED.3IONS-SCNC: 12 MMOL/L (ref 4–16)
AST SERPL-CCNC: 26 IU/L (ref 15–37)
BILIRUB SERPL-MCNC: 0.9 MG/DL (ref 0–1)
BILIRUBIN DIRECT: 0.2 MG/DL (ref 0–0.3)
BILIRUBIN, INDIRECT: 0.7 MG/DL (ref 0–0.7)
BUN BLDV-MCNC: 11 MG/DL (ref 6–23)
CALCIUM SERPL-MCNC: 8.4 MG/DL (ref 8.3–10.6)
CHLORIDE BLD-SCNC: 101 MMOL/L (ref 99–110)
CO2: 27 MMOL/L (ref 21–32)
CREAT SERPL-MCNC: 0.8 MG/DL (ref 0.9–1.3)
GFR AFRICAN AMERICAN: >60 ML/MIN/1.73M2
GFR NON-AFRICAN AMERICAN: >60 ML/MIN/1.73M2
GLUCOSE BLD-MCNC: 177 MG/DL (ref 70–99)
HCT VFR BLD CALC: 47.6 % (ref 42–52)
HEMOGLOBIN: 15.5 GM/DL (ref 13.5–18)
HOMOCYSTEINE: 7.8 UMOL/L (ref 0–10)
LV EF: 78 %
LVEF MODALITY: NORMAL
MCH RBC QN AUTO: 30.6 PG (ref 27–31)
MCHC RBC AUTO-ENTMCNC: 32.6 % (ref 32–36)
MCV RBC AUTO: 93.9 FL (ref 78–100)
PDW BLD-RTO: 12.7 % (ref 11.7–14.9)
PLATELET # BLD: 144 K/CU MM (ref 140–440)
PMV BLD AUTO: 9.8 FL (ref 7.5–11.1)
POTASSIUM SERPL-SCNC: 3.8 MMOL/L (ref 3.5–5.1)
RBC # BLD: 5.07 M/CU MM (ref 4.6–6.2)
SODIUM BLD-SCNC: 140 MMOL/L (ref 135–145)
TOTAL PROTEIN: 5.6 GM/DL (ref 6.4–8.2)
WBC # BLD: 6.7 K/CU MM (ref 4–10.5)

## 2019-02-28 PROCEDURE — 2580000003 HC RX 258: Performed by: INTERNAL MEDICINE

## 2019-02-28 PROCEDURE — 82140 ASSAY OF AMMONIA: CPT

## 2019-02-28 PROCEDURE — 36415 COLL VENOUS BLD VENIPUNCTURE: CPT

## 2019-02-28 PROCEDURE — 82248 BILIRUBIN DIRECT: CPT

## 2019-02-28 PROCEDURE — 94761 N-INVAS EAR/PLS OXIMETRY MLT: CPT

## 2019-02-28 PROCEDURE — 97530 THERAPEUTIC ACTIVITIES: CPT

## 2019-02-28 PROCEDURE — C9254 INJECTION, LACOSAMIDE: HCPCS | Performed by: PSYCHIATRY & NEUROLOGY

## 2019-02-28 PROCEDURE — 94640 AIRWAY INHALATION TREATMENT: CPT

## 2019-02-28 PROCEDURE — 6360000002 HC RX W HCPCS: Performed by: PSYCHIATRY & NEUROLOGY

## 2019-02-28 PROCEDURE — 96376 TX/PRO/DX INJ SAME DRUG ADON: CPT

## 2019-02-28 PROCEDURE — 2580000003 HC RX 258: Performed by: PSYCHIATRY & NEUROLOGY

## 2019-02-28 PROCEDURE — 85027 COMPLETE CBC AUTOMATED: CPT

## 2019-02-28 PROCEDURE — 6370000000 HC RX 637 (ALT 250 FOR IP): Performed by: INTERNAL MEDICINE

## 2019-02-28 PROCEDURE — 83090 ASSAY OF HOMOCYSTEINE: CPT

## 2019-02-28 PROCEDURE — 1200000000 HC SEMI PRIVATE

## 2019-02-28 PROCEDURE — 97116 GAIT TRAINING THERAPY: CPT

## 2019-02-28 PROCEDURE — 93306 TTE W/DOPPLER COMPLETE: CPT

## 2019-02-28 PROCEDURE — G0378 HOSPITAL OBSERVATION PER HR: HCPCS

## 2019-02-28 PROCEDURE — 80053 COMPREHEN METABOLIC PANEL: CPT

## 2019-02-28 PROCEDURE — 6370000000 HC RX 637 (ALT 250 FOR IP): Performed by: HOSPITALIST

## 2019-02-28 RX ADMIN — SODIUM CHLORIDE, POTASSIUM CHLORIDE, SODIUM LACTATE AND CALCIUM CHLORIDE: 600; 310; 30; 20 INJECTION, SOLUTION INTRAVENOUS at 05:45

## 2019-02-28 RX ADMIN — SERTRALINE HYDROCHLORIDE 50 MG: 50 TABLET ORAL at 09:54

## 2019-02-28 RX ADMIN — CETIRIZINE HYDROCHLORIDE 5 MG: 10 TABLET, FILM COATED ORAL at 09:55

## 2019-02-28 RX ADMIN — APIXABAN 2.5 MG: 2.5 TABLET, FILM COATED ORAL at 20:43

## 2019-02-28 RX ADMIN — ATORVASTATIN CALCIUM 40 MG: 40 TABLET, FILM COATED ORAL at 20:43

## 2019-02-28 RX ADMIN — MONTELUKAST SODIUM 10 MG: 10 TABLET, FILM COATED ORAL at 20:43

## 2019-02-28 RX ADMIN — PANTOPRAZOLE SODIUM 40 MG: 40 TABLET, DELAYED RELEASE ORAL at 06:00

## 2019-02-28 RX ADMIN — Medication 2 PUFF: at 08:40

## 2019-02-28 RX ADMIN — LATANOPROST 1 DROP: 50 SOLUTION OPHTHALMIC at 20:43

## 2019-02-28 RX ADMIN — ASPIRIN 81 MG 81 MG: 81 TABLET ORAL at 09:54

## 2019-02-28 RX ADMIN — DEXTROSE MONOHYDRATE 50 MG: 50 INJECTION, SOLUTION INTRAVENOUS at 02:15

## 2019-02-28 RX ADMIN — APIXABAN 2.5 MG: 2.5 TABLET, FILM COATED ORAL at 09:54

## 2019-02-28 RX ADMIN — SODIUM CHLORIDE, POTASSIUM CHLORIDE, SODIUM LACTATE AND CALCIUM CHLORIDE: 600; 310; 30; 20 INJECTION, SOLUTION INTRAVENOUS at 19:23

## 2019-02-28 ASSESSMENT — PAIN SCALES - GENERAL
PAINLEVEL_OUTOF10: 0

## 2019-03-01 PROCEDURE — G0378 HOSPITAL OBSERVATION PER HR: HCPCS

## 2019-03-01 PROCEDURE — 1200000000 HC SEMI PRIVATE

## 2019-03-01 PROCEDURE — 6370000000 HC RX 637 (ALT 250 FOR IP): Performed by: INTERNAL MEDICINE

## 2019-03-01 PROCEDURE — 94761 N-INVAS EAR/PLS OXIMETRY MLT: CPT

## 2019-03-01 PROCEDURE — 6370000000 HC RX 637 (ALT 250 FOR IP): Performed by: HOSPITALIST

## 2019-03-01 PROCEDURE — 6370000000 HC RX 637 (ALT 250 FOR IP): Performed by: PSYCHIATRY & NEUROLOGY

## 2019-03-01 PROCEDURE — 94640 AIRWAY INHALATION TREATMENT: CPT

## 2019-03-01 RX ORDER — ACETAMINOPHEN 80 MG
TABLET,CHEWABLE ORAL
Status: DISPENSED
Start: 2019-03-01 | End: 2019-03-01

## 2019-03-01 RX ADMIN — Medication 2 PUFF: at 09:24

## 2019-03-01 RX ADMIN — MONTELUKAST SODIUM 10 MG: 10 TABLET, FILM COATED ORAL at 21:48

## 2019-03-01 RX ADMIN — ALBUTEROL SULFATE 2 PUFF: 90 AEROSOL, METERED RESPIRATORY (INHALATION) at 09:24

## 2019-03-01 RX ADMIN — ATORVASTATIN CALCIUM 40 MG: 40 TABLET, FILM COATED ORAL at 21:48

## 2019-03-01 RX ADMIN — APIXABAN 2.5 MG: 2.5 TABLET, FILM COATED ORAL at 21:48

## 2019-03-01 RX ADMIN — SERTRALINE HYDROCHLORIDE 50 MG: 50 TABLET ORAL at 17:26

## 2019-03-01 RX ADMIN — CETIRIZINE HYDROCHLORIDE 5 MG: 10 TABLET, FILM COATED ORAL at 09:01

## 2019-03-01 RX ADMIN — LATANOPROST 1 DROP: 50 SOLUTION OPHTHALMIC at 21:50

## 2019-03-01 RX ADMIN — ASPIRIN 81 MG 81 MG: 81 TABLET ORAL at 08:59

## 2019-03-01 RX ADMIN — PANTOPRAZOLE SODIUM 40 MG: 40 TABLET, DELAYED RELEASE ORAL at 09:11

## 2019-03-01 RX ADMIN — APIXABAN 2.5 MG: 2.5 TABLET, FILM COATED ORAL at 09:01

## 2019-03-01 ASSESSMENT — PAIN SCALES - GENERAL
PAINLEVEL_OUTOF10: 0

## 2019-03-02 PROCEDURE — 1200000000 HC SEMI PRIVATE

## 2019-03-02 PROCEDURE — 6370000000 HC RX 637 (ALT 250 FOR IP): Performed by: PSYCHIATRY & NEUROLOGY

## 2019-03-02 PROCEDURE — 6370000000 HC RX 637 (ALT 250 FOR IP): Performed by: INTERNAL MEDICINE

## 2019-03-02 PROCEDURE — 6370000000 HC RX 637 (ALT 250 FOR IP): Performed by: HOSPITALIST

## 2019-03-02 PROCEDURE — G0378 HOSPITAL OBSERVATION PER HR: HCPCS

## 2019-03-02 PROCEDURE — 94761 N-INVAS EAR/PLS OXIMETRY MLT: CPT

## 2019-03-02 PROCEDURE — 94640 AIRWAY INHALATION TREATMENT: CPT

## 2019-03-02 RX ADMIN — ALBUTEROL SULFATE 2 PUFF: 90 AEROSOL, METERED RESPIRATORY (INHALATION) at 10:06

## 2019-03-02 RX ADMIN — Medication 2 PUFF: at 09:00

## 2019-03-02 RX ADMIN — Medication 2 PUFF: at 21:18

## 2019-03-02 RX ADMIN — ASPIRIN 81 MG 81 MG: 81 TABLET ORAL at 09:24

## 2019-03-02 RX ADMIN — SERTRALINE HYDROCHLORIDE 50 MG: 50 TABLET ORAL at 17:11

## 2019-03-02 RX ADMIN — ALBUTEROL SULFATE 2 PUFF: 90 AEROSOL, METERED RESPIRATORY (INHALATION) at 21:20

## 2019-03-02 RX ADMIN — LATANOPROST 1 DROP: 50 SOLUTION OPHTHALMIC at 20:21

## 2019-03-02 RX ADMIN — ATORVASTATIN CALCIUM 40 MG: 40 TABLET, FILM COATED ORAL at 20:21

## 2019-03-02 RX ADMIN — PANTOPRAZOLE SODIUM 40 MG: 40 TABLET, DELAYED RELEASE ORAL at 06:14

## 2019-03-02 RX ADMIN — ALPRAZOLAM 0.25 MG: 0.25 TABLET ORAL at 20:21

## 2019-03-02 RX ADMIN — APIXABAN 5 MG: 5 TABLET, FILM COATED ORAL at 09:24

## 2019-03-02 RX ADMIN — MONTELUKAST SODIUM 10 MG: 10 TABLET, FILM COATED ORAL at 20:21

## 2019-03-02 RX ADMIN — ALPRAZOLAM 0.25 MG: 0.25 TABLET ORAL at 02:32

## 2019-03-02 RX ADMIN — APIXABAN 5 MG: 5 TABLET, FILM COATED ORAL at 20:21

## 2019-03-02 RX ADMIN — CETIRIZINE HYDROCHLORIDE 5 MG: 10 TABLET, FILM COATED ORAL at 09:24

## 2019-03-02 ASSESSMENT — PAIN SCALES - GENERAL
PAINLEVEL_OUTOF10: 0
PAINLEVEL_OUTOF10: 0

## 2019-03-03 PROCEDURE — G0378 HOSPITAL OBSERVATION PER HR: HCPCS

## 2019-03-03 PROCEDURE — 94761 N-INVAS EAR/PLS OXIMETRY MLT: CPT

## 2019-03-03 PROCEDURE — 6370000000 HC RX 637 (ALT 250 FOR IP): Performed by: PSYCHIATRY & NEUROLOGY

## 2019-03-03 PROCEDURE — 1200000000 HC SEMI PRIVATE

## 2019-03-03 PROCEDURE — 6370000000 HC RX 637 (ALT 250 FOR IP): Performed by: INTERNAL MEDICINE

## 2019-03-03 PROCEDURE — 94640 AIRWAY INHALATION TREATMENT: CPT

## 2019-03-03 PROCEDURE — 6370000000 HC RX 637 (ALT 250 FOR IP): Performed by: HOSPITALIST

## 2019-03-03 RX ADMIN — MONTELUKAST SODIUM 10 MG: 10 TABLET, FILM COATED ORAL at 20:55

## 2019-03-03 RX ADMIN — APIXABAN 5 MG: 5 TABLET, FILM COATED ORAL at 08:13

## 2019-03-03 RX ADMIN — ALPRAZOLAM 0.25 MG: 0.25 TABLET ORAL at 20:31

## 2019-03-03 RX ADMIN — SERTRALINE HYDROCHLORIDE 50 MG: 50 TABLET ORAL at 17:34

## 2019-03-03 RX ADMIN — Medication 2 PUFF: at 07:49

## 2019-03-03 RX ADMIN — LATANOPROST 1 DROP: 50 SOLUTION OPHTHALMIC at 20:57

## 2019-03-03 RX ADMIN — APIXABAN 5 MG: 5 TABLET, FILM COATED ORAL at 20:55

## 2019-03-03 RX ADMIN — ASPIRIN 81 MG 81 MG: 81 TABLET ORAL at 08:13

## 2019-03-03 RX ADMIN — ATORVASTATIN CALCIUM 40 MG: 40 TABLET, FILM COATED ORAL at 20:55

## 2019-03-03 RX ADMIN — ALBUTEROL SULFATE 2 PUFF: 90 AEROSOL, METERED RESPIRATORY (INHALATION) at 17:19

## 2019-03-03 RX ADMIN — PANTOPRAZOLE SODIUM 40 MG: 40 TABLET, DELAYED RELEASE ORAL at 08:14

## 2019-03-03 RX ADMIN — CETIRIZINE HYDROCHLORIDE 5 MG: 10 TABLET, FILM COATED ORAL at 08:14

## 2019-03-03 ASSESSMENT — PAIN SCALES - GENERAL
PAINLEVEL_OUTOF10: 0
PAINLEVEL_OUTOF10: 0

## 2019-03-04 LAB
ANION GAP SERPL CALCULATED.3IONS-SCNC: 8 MMOL/L (ref 4–16)
BUN BLDV-MCNC: 17 MG/DL (ref 6–23)
CALCIUM SERPL-MCNC: 9.2 MG/DL (ref 8.3–10.6)
CHLORIDE BLD-SCNC: 98 MMOL/L (ref 99–110)
CO2: 28 MMOL/L (ref 21–32)
CREAT SERPL-MCNC: 0.8 MG/DL (ref 0.9–1.3)
GFR AFRICAN AMERICAN: >60 ML/MIN/1.73M2
GFR NON-AFRICAN AMERICAN: >60 ML/MIN/1.73M2
GLUCOSE BLD-MCNC: 158 MG/DL (ref 70–99)
HCT VFR BLD CALC: 47 % (ref 42–52)
HEMOGLOBIN: 15 GM/DL (ref 13.5–18)
MCH RBC QN AUTO: 30.5 PG (ref 27–31)
MCHC RBC AUTO-ENTMCNC: 31.9 % (ref 32–36)
MCV RBC AUTO: 95.5 FL (ref 78–100)
PDW BLD-RTO: 12.7 % (ref 11.7–14.9)
PLATELET # BLD: 175 K/CU MM (ref 140–440)
PMV BLD AUTO: 10.2 FL (ref 7.5–11.1)
POTASSIUM SERPL-SCNC: 4.2 MMOL/L (ref 3.5–5.1)
RBC # BLD: 4.92 M/CU MM (ref 4.6–6.2)
SODIUM BLD-SCNC: 134 MMOL/L (ref 135–145)
WBC # BLD: 6.7 K/CU MM (ref 4–10.5)

## 2019-03-04 PROCEDURE — 1200000000 HC SEMI PRIVATE

## 2019-03-04 PROCEDURE — 94761 N-INVAS EAR/PLS OXIMETRY MLT: CPT

## 2019-03-04 PROCEDURE — 6370000000 HC RX 637 (ALT 250 FOR IP): Performed by: PSYCHIATRY & NEUROLOGY

## 2019-03-04 PROCEDURE — 85027 COMPLETE CBC AUTOMATED: CPT

## 2019-03-04 PROCEDURE — G0378 HOSPITAL OBSERVATION PER HR: HCPCS

## 2019-03-04 PROCEDURE — 97116 GAIT TRAINING THERAPY: CPT

## 2019-03-04 PROCEDURE — 97530 THERAPEUTIC ACTIVITIES: CPT

## 2019-03-04 PROCEDURE — 6370000000 HC RX 637 (ALT 250 FOR IP): Performed by: HOSPITALIST

## 2019-03-04 PROCEDURE — 97110 THERAPEUTIC EXERCISES: CPT

## 2019-03-04 PROCEDURE — 6370000000 HC RX 637 (ALT 250 FOR IP): Performed by: INTERNAL MEDICINE

## 2019-03-04 PROCEDURE — 94640 AIRWAY INHALATION TREATMENT: CPT

## 2019-03-04 PROCEDURE — 36415 COLL VENOUS BLD VENIPUNCTURE: CPT

## 2019-03-04 PROCEDURE — 80048 BASIC METABOLIC PNL TOTAL CA: CPT

## 2019-03-04 RX ADMIN — ALBUTEROL SULFATE 2 PUFF: 90 AEROSOL, METERED RESPIRATORY (INHALATION) at 21:18

## 2019-03-04 RX ADMIN — Medication 2 PUFF: at 08:17

## 2019-03-04 RX ADMIN — ASPIRIN 81 MG 81 MG: 81 TABLET ORAL at 09:14

## 2019-03-04 RX ADMIN — PANTOPRAZOLE SODIUM 40 MG: 40 TABLET, DELAYED RELEASE ORAL at 06:13

## 2019-03-04 RX ADMIN — ATORVASTATIN CALCIUM 40 MG: 40 TABLET, FILM COATED ORAL at 21:18

## 2019-03-04 RX ADMIN — APIXABAN 5 MG: 5 TABLET, FILM COATED ORAL at 21:18

## 2019-03-04 RX ADMIN — CETIRIZINE HYDROCHLORIDE 5 MG: 10 TABLET, FILM COATED ORAL at 09:14

## 2019-03-04 RX ADMIN — Medication 2 PUFF: at 21:18

## 2019-03-04 RX ADMIN — APIXABAN 5 MG: 5 TABLET, FILM COATED ORAL at 09:14

## 2019-03-04 RX ADMIN — MONTELUKAST SODIUM 10 MG: 10 TABLET, FILM COATED ORAL at 21:18

## 2019-03-04 RX ADMIN — LATANOPROST 1 DROP: 50 SOLUTION OPHTHALMIC at 21:18

## 2019-03-04 RX ADMIN — ALPRAZOLAM 0.25 MG: 0.25 TABLET ORAL at 22:56

## 2019-03-04 RX ADMIN — SERTRALINE HYDROCHLORIDE 50 MG: 50 TABLET ORAL at 19:26

## 2019-03-04 ASSESSMENT — PAIN SCALES - GENERAL
PAINLEVEL_OUTOF10: 0

## 2019-03-05 VITALS
BODY MASS INDEX: 28.39 KG/M2 | RESPIRATION RATE: 18 BRPM | WEIGHT: 166.3 LBS | HEART RATE: 64 BPM | TEMPERATURE: 97.5 F | OXYGEN SATURATION: 97 % | HEIGHT: 64 IN | DIASTOLIC BLOOD PRESSURE: 60 MMHG | SYSTOLIC BLOOD PRESSURE: 112 MMHG

## 2019-03-05 PROCEDURE — 97530 THERAPEUTIC ACTIVITIES: CPT

## 2019-03-05 PROCEDURE — 6370000000 HC RX 637 (ALT 250 FOR IP): Performed by: INTERNAL MEDICINE

## 2019-03-05 PROCEDURE — 94761 N-INVAS EAR/PLS OXIMETRY MLT: CPT

## 2019-03-05 PROCEDURE — 6370000000 HC RX 637 (ALT 250 FOR IP): Performed by: PSYCHIATRY & NEUROLOGY

## 2019-03-05 PROCEDURE — 97110 THERAPEUTIC EXERCISES: CPT

## 2019-03-05 PROCEDURE — 94640 AIRWAY INHALATION TREATMENT: CPT

## 2019-03-05 PROCEDURE — 6370000000 HC RX 637 (ALT 250 FOR IP): Performed by: HOSPITALIST

## 2019-03-05 PROCEDURE — 97116 GAIT TRAINING THERAPY: CPT

## 2019-03-05 PROCEDURE — G0378 HOSPITAL OBSERVATION PER HR: HCPCS

## 2019-03-05 RX ORDER — ALPRAZOLAM 0.25 MG/1
0.25 TABLET ORAL NIGHTLY PRN
Qty: 5 TABLET | Refills: 0 | Status: SHIPPED | OUTPATIENT
Start: 2019-03-05 | End: 2019-03-10

## 2019-03-05 RX ORDER — PRIMIDONE 50 MG/1
50 TABLET ORAL NIGHTLY
Qty: 90 TABLET | Refills: 3 | DISCHARGE
Start: 2019-03-05 | End: 2020-07-21

## 2019-03-05 RX ORDER — PRIMIDONE 50 MG/1
50 TABLET ORAL NIGHTLY
Status: DISCONTINUED | OUTPATIENT
Start: 2019-03-05 | End: 2019-03-05 | Stop reason: HOSPADM

## 2019-03-05 RX ADMIN — Medication 2 PUFF: at 08:56

## 2019-03-05 RX ADMIN — PANTOPRAZOLE SODIUM 40 MG: 40 TABLET, DELAYED RELEASE ORAL at 05:49

## 2019-03-05 RX ADMIN — SERTRALINE HYDROCHLORIDE 50 MG: 50 TABLET ORAL at 10:13

## 2019-03-05 RX ADMIN — CETIRIZINE HYDROCHLORIDE 5 MG: 10 TABLET, FILM COATED ORAL at 10:13

## 2019-03-05 RX ADMIN — ASPIRIN 81 MG 81 MG: 81 TABLET ORAL at 10:13

## 2019-03-05 RX ADMIN — ALBUTEROL SULFATE 2 PUFF: 90 AEROSOL, METERED RESPIRATORY (INHALATION) at 08:57

## 2019-03-05 RX ADMIN — APIXABAN 5 MG: 5 TABLET, FILM COATED ORAL at 10:13

## 2019-03-05 ASSESSMENT — PAIN SCALES - GENERAL
PAINLEVEL_OUTOF10: 0

## 2019-03-06 ENCOUNTER — HOSPITAL ENCOUNTER (OUTPATIENT)
Age: 79
Setting detail: SPECIMEN
Discharge: HOME | End: 2019-03-06
Payer: MEDICARE

## 2019-03-06 LAB
ALBUMIN SERPL-MCNC: 3.7 GM/DL (ref 3.4–5)
ALP BLD-CCNC: 63 IU/L (ref 40–128)
ALT SERPL-CCNC: 22 U/L (ref 10–40)
ANION GAP SERPL CALCULATED.3IONS-SCNC: 8 MMOL/L (ref 4–16)
AST SERPL-CCNC: 26 IU/L (ref 15–37)
BILIRUB SERPL-MCNC: 0.7 MG/DL (ref 0–1)
BUN BLDV-MCNC: 17 MG/DL (ref 6–23)
CALCIUM SERPL-MCNC: 9.4 MG/DL (ref 8.3–10.6)
CHLORIDE BLD-SCNC: 102 MMOL/L (ref 99–110)
CO2: 30 MMOL/L (ref 21–32)
CREAT SERPL-MCNC: 0.9 MG/DL (ref 0.9–1.3)
EKG ATRIAL RATE: 64 BPM
EKG DIAGNOSIS: NORMAL
EKG P AXIS: 46 DEGREES
EKG P-R INTERVAL: 190 MS
EKG Q-T INTERVAL: 410 MS
EKG QRS DURATION: 98 MS
EKG QTC CALCULATION (BAZETT): 422 MS
EKG R AXIS: -39 DEGREES
EKG T AXIS: -5 DEGREES
EKG VENTRICULAR RATE: 64 BPM
GFR AFRICAN AMERICAN: >60 ML/MIN/1.73M2
GFR NON-AFRICAN AMERICAN: >60 ML/MIN/1.73M2
GLUCOSE BLD-MCNC: 83 MG/DL (ref 70–99)
HCT VFR BLD CALC: 44.6 % (ref 42–52)
HEMOGLOBIN: 14.3 GM/DL (ref 13.5–18)
MCH RBC QN AUTO: 30.4 PG (ref 27–31)
MCHC RBC AUTO-ENTMCNC: 32.1 % (ref 32–36)
MCV RBC AUTO: 94.9 FL (ref 78–100)
PDW BLD-RTO: 12.8 % (ref 11.7–14.9)
PLATELET # BLD: 167 K/CU MM (ref 140–440)
PMV BLD AUTO: 10.5 FL (ref 7.5–11.1)
POTASSIUM SERPL-SCNC: 4.5 MMOL/L (ref 3.5–5.1)
RBC # BLD: 4.7 M/CU MM (ref 4.6–6.2)
SODIUM BLD-SCNC: 140 MMOL/L (ref 135–145)
TOTAL PROTEIN: 5.4 GM/DL (ref 6.4–8.2)
WBC # BLD: 6.9 K/CU MM (ref 4–10.5)

## 2019-03-06 PROCEDURE — 36415 COLL VENOUS BLD VENIPUNCTURE: CPT

## 2019-03-06 PROCEDURE — 85027 COMPLETE CBC AUTOMATED: CPT

## 2019-03-06 PROCEDURE — 80053 COMPREHEN METABOLIC PANEL: CPT

## 2019-03-08 ENCOUNTER — HOSPITAL ENCOUNTER (OUTPATIENT)
Age: 79
Setting detail: SPECIMEN
Discharge: HOME | End: 2019-03-08
Payer: MEDICARE

## 2019-03-08 LAB
ALBUMIN SERPL-MCNC: 4 GM/DL (ref 3.4–5)
ALP BLD-CCNC: 62 IU/L (ref 40–129)
ALT SERPL-CCNC: 26 U/L (ref 10–40)
AMMONIA: 32 UMOL/L (ref 16–60)
ANION GAP SERPL CALCULATED.3IONS-SCNC: 9 MMOL/L (ref 4–16)
AST SERPL-CCNC: 31 IU/L (ref 15–37)
BILIRUB SERPL-MCNC: 1.3 MG/DL (ref 0–1)
BUN BLDV-MCNC: 20 MG/DL (ref 6–23)
CALCIUM SERPL-MCNC: 8.6 MG/DL (ref 8.3–10.6)
CHLORIDE BLD-SCNC: 102 MMOL/L (ref 99–110)
CO2: 29 MMOL/L (ref 21–32)
CREAT SERPL-MCNC: 0.9 MG/DL (ref 0.9–1.3)
GAMMA GLUTAMYL TRANSFERASE: 19 IU/L (ref 8–61)
GFR AFRICAN AMERICAN: >60 ML/MIN/1.73M2
GFR NON-AFRICAN AMERICAN: >60 ML/MIN/1.73M2
GLUCOSE BLD-MCNC: 84 MG/DL (ref 70–99)
POTASSIUM SERPL-SCNC: 4.3 MMOL/L (ref 3.5–5.1)
SODIUM BLD-SCNC: 140 MMOL/L (ref 135–145)
TOTAL PROTEIN: 5.6 GM/DL (ref 6.4–8.2)

## 2019-03-08 PROCEDURE — 82977 ASSAY OF GGT: CPT

## 2019-03-08 PROCEDURE — 80053 COMPREHEN METABOLIC PANEL: CPT

## 2019-03-08 PROCEDURE — 82140 ASSAY OF AMMONIA: CPT

## 2019-03-08 PROCEDURE — 36415 COLL VENOUS BLD VENIPUNCTURE: CPT

## 2019-03-13 ENCOUNTER — APPOINTMENT (OUTPATIENT)
Dept: GENERAL RADIOLOGY | Age: 79
End: 2019-03-13
Payer: MEDICARE

## 2019-03-13 ENCOUNTER — HOSPITAL ENCOUNTER (EMERGENCY)
Age: 79
Discharge: HOME | End: 2019-03-13
Attending: EMERGENCY MEDICINE
Payer: MEDICARE

## 2019-03-13 ENCOUNTER — APPOINTMENT (OUTPATIENT)
Dept: CT IMAGING | Age: 79
End: 2019-03-13
Payer: MEDICARE

## 2019-03-13 VITALS
OXYGEN SATURATION: 100 % | DIASTOLIC BLOOD PRESSURE: 95 MMHG | HEART RATE: 64 BPM | SYSTOLIC BLOOD PRESSURE: 162 MMHG | TEMPERATURE: 98.1 F | RESPIRATION RATE: 24 BRPM

## 2019-03-13 DIAGNOSIS — R40.4 TRANSIENT ALTERATION OF AWARENESS: Primary | ICD-10-CM

## 2019-03-13 LAB
ALBUMIN SERPL-MCNC: 3.9 GM/DL (ref 3.4–5)
ALP BLD-CCNC: 64 IU/L (ref 40–129)
ALT SERPL-CCNC: 27 U/L (ref 10–40)
ANION GAP SERPL CALCULATED.3IONS-SCNC: 10 MMOL/L (ref 4–16)
AST SERPL-CCNC: 34 IU/L (ref 15–37)
BACTERIA: NEGATIVE /HPF
BASOPHILS ABSOLUTE: 0 K/CU MM
BASOPHILS RELATIVE PERCENT: 0.4 % (ref 0–1)
BILIRUB SERPL-MCNC: 1.4 MG/DL (ref 0–1)
BILIRUBIN URINE: NEGATIVE MG/DL
BLOOD, URINE: ABNORMAL
BUN BLDV-MCNC: 10 MG/DL (ref 6–23)
CALCIUM SERPL-MCNC: 8.6 MG/DL (ref 8.3–10.6)
CHLORIDE BLD-SCNC: 107 MMOL/L (ref 99–110)
CLARITY: CLEAR
CO2: 24 MMOL/L (ref 21–32)
COLOR: COLORLESS
CREAT SERPL-MCNC: 0.8 MG/DL (ref 0.9–1.3)
DIFFERENTIAL TYPE: ABNORMAL
EOSINOPHILS ABSOLUTE: 0.2 K/CU MM
EOSINOPHILS RELATIVE PERCENT: 2.4 % (ref 0–3)
GFR AFRICAN AMERICAN: >60 ML/MIN/1.73M2
GFR NON-AFRICAN AMERICAN: >60 ML/MIN/1.73M2
GLUCOSE BLD-MCNC: 80 MG/DL (ref 70–99)
GLUCOSE BLD-MCNC: 96 MG/DL (ref 70–99)
GLUCOSE, URINE: NEGATIVE MG/DL
HCT VFR BLD CALC: 51.8 % (ref 42–52)
HEMOGLOBIN: 16.3 GM/DL (ref 13.5–18)
IMMATURE NEUTROPHIL %: 0.4 % (ref 0–0.43)
KETONES, URINE: NEGATIVE MG/DL
LEUKOCYTE ESTERASE, URINE: NEGATIVE
LYMPHOCYTES ABSOLUTE: 2 K/CU MM
LYMPHOCYTES RELATIVE PERCENT: 27 % (ref 24–44)
MCH RBC QN AUTO: 30.6 PG (ref 27–31)
MCHC RBC AUTO-ENTMCNC: 31.5 % (ref 32–36)
MCV RBC AUTO: 97.2 FL (ref 78–100)
MONOCYTES ABSOLUTE: 0.8 K/CU MM
MONOCYTES RELATIVE PERCENT: 11.1 % (ref 0–4)
NITRITE URINE, QUANTITATIVE: NEGATIVE
NUCLEATED RBC %: 0 %
PDW BLD-RTO: 12.6 % (ref 11.7–14.9)
PH, URINE: 9 (ref 5–8)
PLATELET # BLD: 161 K/CU MM (ref 140–440)
PMV BLD AUTO: 9.5 FL (ref 7.5–11.1)
POTASSIUM SERPL-SCNC: 4.3 MMOL/L (ref 3.5–5.1)
PROTEIN UA: NEGATIVE MG/DL
RBC # BLD: 5.33 M/CU MM (ref 4.6–6.2)
RBC URINE: 2 /HPF (ref 0–3)
REASON FOR REJECTION: NORMAL
REASON FOR REJECTION: NORMAL
REJECTED TEST: NORMAL
SEGMENTED NEUTROPHILS ABSOLUTE COUNT: 4.4 K/CU MM
SEGMENTED NEUTROPHILS RELATIVE PERCENT: 58.7 % (ref 36–66)
SODIUM BLD-SCNC: 141 MMOL/L (ref 135–145)
SPECIFIC GRAVITY UA: 1 (ref 1–1.03)
TOTAL CK: 73 IU/L (ref 38–174)
TOTAL IMMATURE NEUTOROPHIL: 0.03 K/CU MM
TOTAL NUCLEATED RBC: 0 K/CU MM
TOTAL PROTEIN: 5.8 GM/DL (ref 6.4–8.2)
TRICHOMONAS: ABNORMAL /HPF
TROPONIN T: <0.01 NG/ML
UROBILINOGEN, URINE: NORMAL MG/DL (ref 0.2–1)
WBC # BLD: 7.5 K/CU MM (ref 4–10.5)
WBC UA: ABNORMAL /HPF (ref 0–2)

## 2019-03-13 PROCEDURE — 84484 ASSAY OF TROPONIN QUANT: CPT

## 2019-03-13 PROCEDURE — 85025 COMPLETE CBC W/AUTO DIFF WBC: CPT

## 2019-03-13 PROCEDURE — 82962 GLUCOSE BLOOD TEST: CPT

## 2019-03-13 PROCEDURE — 36415 COLL VENOUS BLD VENIPUNCTURE: CPT

## 2019-03-13 PROCEDURE — 93005 ELECTROCARDIOGRAM TRACING: CPT | Performed by: EMERGENCY MEDICINE

## 2019-03-13 PROCEDURE — 80053 COMPREHEN METABOLIC PANEL: CPT

## 2019-03-13 PROCEDURE — 71045 X-RAY EXAM CHEST 1 VIEW: CPT

## 2019-03-13 PROCEDURE — 81001 URINALYSIS AUTO W/SCOPE: CPT

## 2019-03-13 PROCEDURE — 82550 ASSAY OF CK (CPK): CPT

## 2019-03-13 PROCEDURE — 99285 EMERGENCY DEPT VISIT HI MDM: CPT

## 2019-03-13 PROCEDURE — 70450 CT HEAD/BRAIN W/O DYE: CPT

## 2019-03-13 RX ORDER — LORAZEPAM 0.5 MG/1
0.5 TABLET ORAL ONCE
Status: DISCONTINUED | OUTPATIENT
Start: 2019-03-13 | End: 2019-03-13 | Stop reason: HOSPADM

## 2019-03-13 RX ORDER — 0.9 % SODIUM CHLORIDE 0.9 %
1000 INTRAVENOUS SOLUTION INTRAVENOUS ONCE
Status: DISCONTINUED | OUTPATIENT
Start: 2019-03-13 | End: 2019-03-13 | Stop reason: HOSPADM

## 2019-03-14 PROCEDURE — 93010 ELECTROCARDIOGRAM REPORT: CPT | Performed by: INTERNAL MEDICINE

## 2019-03-19 LAB
EKG ATRIAL RATE: 62 BPM
EKG DIAGNOSIS: NORMAL
EKG P AXIS: 38 DEGREES
EKG P-R INTERVAL: 190 MS
EKG Q-T INTERVAL: 422 MS
EKG QRS DURATION: 104 MS
EKG QTC CALCULATION (BAZETT): 428 MS
EKG R AXIS: -36 DEGREES
EKG T AXIS: -7 DEGREES
EKG VENTRICULAR RATE: 62 BPM

## 2019-04-01 ENCOUNTER — OFFICE VISIT (OUTPATIENT)
Dept: PULMONOLOGY | Age: 79
End: 2019-04-01
Payer: MEDICARE

## 2019-04-01 VITALS — WEIGHT: 159 LBS | HEART RATE: 70 BPM | HEIGHT: 64 IN | BODY MASS INDEX: 27.14 KG/M2 | OXYGEN SATURATION: 97 %

## 2019-04-01 DIAGNOSIS — R06.02 SOB (SHORTNESS OF BREATH): ICD-10-CM

## 2019-04-01 DIAGNOSIS — J45.30 MILD PERSISTENT ASTHMA WITHOUT COMPLICATION: ICD-10-CM

## 2019-04-01 DIAGNOSIS — J44.9 COPD, MILD (HCC): ICD-10-CM

## 2019-04-01 DIAGNOSIS — J45.31 MILD PERSISTENT ASTHMA WITH EXACERBATION: Primary | ICD-10-CM

## 2019-04-01 PROCEDURE — 99213 OFFICE O/P EST LOW 20 MIN: CPT | Performed by: INTERNAL MEDICINE

## 2019-04-01 RX ORDER — PREDNISONE 1 MG/1
TABLET ORAL
Qty: 20 TABLET | Refills: 0 | Status: ON HOLD | OUTPATIENT
Start: 2019-04-01 | End: 2021-04-14 | Stop reason: HOSPADM

## 2019-04-01 RX ORDER — ALBUTEROL SULFATE 2.5 MG/3ML
2.5 SOLUTION RESPIRATORY (INHALATION) EVERY 6 HOURS PRN
Qty: 120 EACH | Refills: 3 | Status: SHIPPED | OUTPATIENT
Start: 2019-04-01 | End: 2022-04-14 | Stop reason: SDUPTHER

## 2019-04-01 RX ORDER — ALPRAZOLAM 0.25 MG/1
0.5 TABLET ORAL NIGHTLY PRN
COMMUNITY

## 2019-04-01 NOTE — PROGRESS NOTES
SUBJECTIVE:  Chief Complaint: Mild persistent asthma with exacerbation, mild COPD, shortness of breath  Mr. Heck Him has been in and out of several hospitals over the past 3 months. He was hospitalized with altered mental status and possibly had a seizure along with a TIA. He states that recently he has noted more wheezing and shortness of breath but is not expectorating purulent sputum. He continues on Breo, Singulair and albuterol rescue but does not have albuterol solution for his nebulizer. He denies hemoptysis or chest pain. Continues to be short of breath with minimal exertion    OBJECTIVE:  Pulse 70   Ht 5' 4\" (1.626 m)   Wt 159 lb (72.1 kg)   SpO2 97%   BMI 27.29 kg/m²      Physical Exam:  Constitutional:  He appears well developed and well-nourished. Neck:  Supple, No palpable lymphadenopathy, No JVD  Cardiovascular:  S1, S2 Normal, Regular rhythm, no murmurs or gallops, No pericardial  rubs. Pulmonary:  Scattered bilateral wheezing without rhonchi or rales  Abdomen: Not examined   Extremities: no edema, No DVT  Neurologic:  Awake and Alert, No focal deficits    Radiology: Chest x-ray on 3/13/19 showed persistent enlarged cardiac silhouette with otherwise no acute cardiopulmonary abnormality     PFT: Office spirometry on 2/22/18 them straight no significant airways obstruction and no significant response to bronchodilators        ASSESSMENT:    1. Mild persistent asthma with exacerbation    2. Mild persistent asthma without complication    3. COPD, mild ([address]    4. SOB (shortness of breath)          PLAN:   For his mild bronchospasm I am going to put him on a short tapering course oral prednisone. I recommended he use his nebulizer with albuterol solution to help control his daytime wheezing and make sure he uses a treatment before he goes to bed at night.  I will see him back in several months and repeat his pulmonary function test. I will continue to follow him  Return in about 2 months (around 6/1/2019) for Recheck for Asthma, PFT testing, Recheck for COPD, Recheck for Shortness of Breath. This dictation was performed with a verbal recognition program and it was checked for errors. It is possible that there are still dictated errors within this office note. Any errors should be brought immediately to my attention for correction. All efforts were made to ensure that this office note is accurate.

## 2019-04-11 ENCOUNTER — HOSPITAL ENCOUNTER (OUTPATIENT)
Age: 79
Setting detail: SPECIMEN
Discharge: HOME | End: 2019-04-11
Payer: MEDICARE

## 2019-04-11 LAB
ALBUMIN SERPL-MCNC: 3.8 GM/DL (ref 3.4–5)
ALP BLD-CCNC: 68 IU/L (ref 40–129)
ALT SERPL-CCNC: 42 U/L (ref 10–40)
ANION GAP SERPL CALCULATED.3IONS-SCNC: 7 MMOL/L (ref 4–16)
AST SERPL-CCNC: 31 IU/L (ref 15–37)
BILIRUB SERPL-MCNC: 0.9 MG/DL (ref 0–1)
BUN BLDV-MCNC: 18 MG/DL (ref 6–23)
CALCIUM SERPL-MCNC: 8.1 MG/DL (ref 8.3–10.6)
CHLORIDE BLD-SCNC: 105 MMOL/L (ref 99–110)
CO2: 29 MMOL/L (ref 21–32)
CREAT SERPL-MCNC: 1 MG/DL (ref 0.9–1.3)
GFR AFRICAN AMERICAN: >60 ML/MIN/1.73M2
GFR NON-AFRICAN AMERICAN: >60 ML/MIN/1.73M2
GLUCOSE BLD-MCNC: 83 MG/DL (ref 70–99)
HCT VFR BLD CALC: 44.7 % (ref 42–52)
HEMOGLOBIN: 14 GM/DL (ref 13.5–18)
MCH RBC QN AUTO: 30 PG (ref 27–31)
MCHC RBC AUTO-ENTMCNC: 31.3 % (ref 32–36)
MCV RBC AUTO: 95.9 FL (ref 78–100)
PDW BLD-RTO: 13.5 % (ref 11.7–14.9)
PLATELET # BLD: 152 K/CU MM (ref 140–440)
PMV BLD AUTO: 10.5 FL (ref 7.5–11.1)
POTASSIUM SERPL-SCNC: 4.5 MMOL/L (ref 3.5–5.1)
RBC # BLD: 4.66 M/CU MM (ref 4.6–6.2)
SODIUM BLD-SCNC: 141 MMOL/L (ref 135–145)
TOTAL PROTEIN: 5.7 GM/DL (ref 6.4–8.2)
WBC # BLD: 7.8 K/CU MM (ref 4–10.5)

## 2019-04-11 PROCEDURE — 80053 COMPREHEN METABOLIC PANEL: CPT

## 2019-04-11 PROCEDURE — 85027 COMPLETE CBC AUTOMATED: CPT

## 2019-04-11 PROCEDURE — 36415 COLL VENOUS BLD VENIPUNCTURE: CPT

## 2019-04-17 ENCOUNTER — HOSPITAL ENCOUNTER (OUTPATIENT)
Age: 79
Setting detail: SPECIMEN
Discharge: HOME | End: 2019-04-17
Payer: MEDICARE

## 2019-04-17 LAB
BACTERIA: NEGATIVE /HPF
BILIRUBIN URINE: NEGATIVE MG/DL
BLOOD, URINE: ABNORMAL
CLARITY: CLEAR
COLOR: COLORLESS
GLUCOSE, URINE: NEGATIVE MG/DL
KETONES, URINE: NEGATIVE MG/DL
LEUKOCYTE ESTERASE, URINE: NEGATIVE
NITRITE URINE, QUANTITATIVE: NEGATIVE
PH, URINE: 7 (ref 5–8)
PROTEIN UA: NEGATIVE MG/DL
RBC URINE: <1 /HPF (ref 0–3)
SPECIFIC GRAVITY UA: 1 (ref 1–1.03)
TRICHOMONAS: ABNORMAL /HPF
UROBILINOGEN, URINE: NORMAL MG/DL (ref 0.2–1)
WBC UA: ABNORMAL /HPF (ref 0–2)

## 2019-04-17 PROCEDURE — 87086 URINE CULTURE/COLONY COUNT: CPT

## 2019-04-17 PROCEDURE — 81001 URINALYSIS AUTO W/SCOPE: CPT

## 2019-04-18 LAB
CULTURE: NORMAL
Lab: NORMAL
SPECIMEN: NORMAL

## 2019-05-09 ENCOUNTER — TELEPHONE (OUTPATIENT)
Dept: CARDIOLOGY CLINIC | Age: 79
End: 2019-05-09

## 2019-06-03 ENCOUNTER — NURSE ONLY (OUTPATIENT)
Dept: PULMONOLOGY | Age: 79
End: 2019-06-03

## 2019-06-03 DIAGNOSIS — J45.30 MILD PERSISTENT ASTHMA WITHOUT COMPLICATION: Primary | ICD-10-CM

## 2019-06-03 LAB
EXPIRATORY TIME-POST: NORMAL SEC
EXPIRATORY TIME: NORMAL SEC
FEF 25-75% %CHNG: NORMAL
FEF 25-75% %PRED-POST: NORMAL %
FEF 25-75% %PRED-PRE: NORMAL L/SEC
FEF 25-75% PRED: NORMAL L/SEC
FEF 25-75%-POST: NORMAL L/SEC
FEF 25-75%-PRE: NORMAL L/SEC
FEV1 %PRED-POST: 65 %
FEV1 %PRED-PRE: 65 %
FEV1 PRED: 2.3 L
FEV1-POST: 1.49 L
FEV1-PRE: 1.49 L
FEV1/FVC %PRED-POST: 111 %
FEV1/FVC %PRED-PRE: 108 %
FEV1/FVC PRED: 71.3 %
FEV1/FVC-POST: 79 %
FEV1/FVC-PRE: 76.8 %
FVC %PRED-POST: 57 L
FVC %PRED-PRE: 59 %
FVC PRED: 3.29 L
FVC-POST: 1.89 L
FVC-PRE: 1.94 L
PEF %PRED-POST: NORMAL %
PEF %PRED-PRE: NORMAL L/SEC
PEF PRED: NORMAL L/SEC
PEF%CHNG: NORMAL
PEF-POST: NORMAL L/SEC
PEF-PRE: NORMAL L/SEC

## 2019-06-03 PROCEDURE — 99999 PR OFFICE/OUTPT VISIT,PROCEDURE ONLY: CPT | Performed by: INTERNAL MEDICINE

## 2019-06-03 ASSESSMENT — PULMONARY FUNCTION TESTS
FVC_POST: 1.89
FEV1_POST: 1.49
FEV1/FVC_PERCENT_PREDICTED_PRE: 108
FVC_PREDICTED: 3.29
FEV1/FVC_PERCENT_PREDICTED_POST: 111
FVC_PERCENT_PREDICTED_POST: 57
FEV1/FVC_PRE: 76.8
FEV1/FVC_POST: 79.0
FVC_PRE: 1.94
FEV1_PRE: 1.49
FEV1_PERCENT_PREDICTED_POST: 65
FEV1_PERCENT_PREDICTED_PRE: 65
FEV1_PREDICTED: 2.30
FVC_PERCENT_PREDICTED_PRE: 59
FEV1/FVC_PREDICTED: 71.3

## 2019-06-05 RX ORDER — FLUTICASONE FUROATE AND VILANTEROL 100; 25 UG/1; UG/1
1 POWDER RESPIRATORY (INHALATION) DAILY
Qty: 1 EACH | Refills: 0 | Status: CANCELLED | COMMUNITY
Start: 2019-06-05

## 2019-06-10 NOTE — PROGRESS NOTES
Yokasta Torres came to office for a PFT. The patients chief complaint is mild persistent asthma with mild COPD. He demonstrates an FEV1 of 1.49 liters. He demonstrates no significant obstructive lung defect. He shows no significant response to bronchodilators. Overall, his lung function has remained stable over the past year. I will make no changes to his current bronchodilator therapy.  These results will be discussed with him at a later date or his next appointment

## 2019-06-12 DIAGNOSIS — J44.9 COPD, MILD (HCC): ICD-10-CM

## 2019-06-12 DIAGNOSIS — J45.30 MILD PERSISTENT ASTHMA WITHOUT COMPLICATION: ICD-10-CM

## 2019-06-12 DIAGNOSIS — R06.02 SOB (SHORTNESS OF BREATH): ICD-10-CM

## 2019-06-12 PROCEDURE — 94060 EVALUATION OF WHEEZING: CPT | Performed by: INTERNAL MEDICINE

## 2019-06-26 ENCOUNTER — HOSPITAL ENCOUNTER (OUTPATIENT)
Age: 79
Setting detail: SPECIMEN
Discharge: HOME | End: 2019-06-26
Payer: MEDICARE

## 2019-06-26 LAB
ADENOVIRUS F 40 41 PCR: NOT DETECTED
ASTROVIRUS PCR: NOT DETECTED
CAMPYLOBACTER PCR: NOT DETECTED
CRYPTOSPORIDIUM PCR: NOT DETECTED
CYCLOSPORA CAYETANENSIS PCR: NOT DETECTED
E COLI 0157 PCR: NOT DETECTED
E COLI ENTEROAGGREGATIVE PCR: NOT DETECTED
E COLI ENTEROPATHOGENIC PCR: NOT DETECTED
E COLI ENTEROTOXIGENIC PCR: NOT DETECTED
E COLI SHIGA LIKE TOXIN PCR: NOT DETECTED
E COLI SHIGELLA/ENTEROINVASIVE PCR: NOT DETECTED
ENTAMOEBA HISTOLYTICA PCR: NOT DETECTED
GIARDIA LAMBLIA PCR: NOT DETECTED
NOROVIRUS GI GII PCR: NOT DETECTED
PLESIOMONAS SHIGELLOIDES PCR: NOT DETECTED
ROTAVIRUS A PCR: NOT DETECTED
SALMONELLA PCR: NOT DETECTED
SAPOVIRUS PCR: NOT DETECTED
VIBRIO CHOLERAE PCR: NOT DETECTED
VIBRIO PCR: NOT DETECTED
YERSINIA ENTEROCOLITICA PCR: NOT DETECTED

## 2019-06-26 PROCEDURE — 87324 CLOSTRIDIUM AG IA: CPT

## 2019-06-26 PROCEDURE — 87507 IADNA-DNA/RNA PROBE TQ 12-25: CPT

## 2019-06-27 LAB
CLOSTRIDIUM DIFFICILE, PCR: NORMAL
CLOSTRIDIUM DIFFICILE, PCR: NORMAL

## 2019-07-30 ENCOUNTER — HOSPITAL ENCOUNTER (EMERGENCY)
Age: 79
Discharge: HOME | End: 2019-07-30
Payer: MEDICARE

## 2019-07-30 VITALS
BODY MASS INDEX: 29.02 KG/M2 | WEIGHT: 170 LBS | OXYGEN SATURATION: 97 % | DIASTOLIC BLOOD PRESSURE: 77 MMHG | SYSTOLIC BLOOD PRESSURE: 124 MMHG | TEMPERATURE: 98 F | RESPIRATION RATE: 16 BRPM | HEIGHT: 64 IN | HEART RATE: 56 BPM

## 2019-07-30 DIAGNOSIS — L76.21 POSTOPERATIVE HEMORRHAGE OF SKIN FOLLOWING DERMATOLOGIC PROCEDURE: Primary | ICD-10-CM

## 2019-07-30 LAB
APTT: 22.3 SECONDS (ref 21.2–33)
BASOPHILS ABSOLUTE: 0 K/CU MM
BASOPHILS RELATIVE PERCENT: 0.6 % (ref 0–1)
DIFFERENTIAL TYPE: ABNORMAL
EOSINOPHILS ABSOLUTE: 0.2 K/CU MM
EOSINOPHILS RELATIVE PERCENT: 2.1 % (ref 0–3)
HCT VFR BLD CALC: 47.2 % (ref 42–52)
HEMOGLOBIN: 15.3 GM/DL (ref 13.5–18)
IMMATURE NEUTROPHIL %: 0.6 % (ref 0–0.43)
INR BLD: 0.95 INDEX
LYMPHOCYTES ABSOLUTE: 1.3 K/CU MM
LYMPHOCYTES RELATIVE PERCENT: 18.3 % (ref 24–44)
MCH RBC QN AUTO: 29.6 PG (ref 27–31)
MCHC RBC AUTO-ENTMCNC: 32.4 % (ref 32–36)
MCV RBC AUTO: 91.3 FL (ref 78–100)
MONOCYTES ABSOLUTE: 0.4 K/CU MM
MONOCYTES RELATIVE PERCENT: 6 % (ref 0–4)
NUCLEATED RBC %: 0 %
PDW BLD-RTO: 13.7 % (ref 11.7–14.9)
PLATELET # BLD: 136 K/CU MM (ref 140–440)
PMV BLD AUTO: 10 FL (ref 7.5–11.1)
PROTHROMBIN TIME: 10.8 SECONDS (ref 9.12–12.5)
RBC # BLD: 5.17 M/CU MM (ref 4.6–6.2)
SEGMENTED NEUTROPHILS ABSOLUTE COUNT: 5.1 K/CU MM
SEGMENTED NEUTROPHILS RELATIVE PERCENT: 72.4 % (ref 36–66)
TOTAL IMMATURE NEUTOROPHIL: 0.04 K/CU MM
TOTAL NUCLEATED RBC: 0 K/CU MM
WBC # BLD: 7.1 K/CU MM (ref 4–10.5)

## 2019-07-30 PROCEDURE — 85025 COMPLETE CBC W/AUTO DIFF WBC: CPT

## 2019-07-30 PROCEDURE — 85730 THROMBOPLASTIN TIME PARTIAL: CPT

## 2019-07-30 PROCEDURE — 6370000000 HC RX 637 (ALT 250 FOR IP): Performed by: PHYSICIAN ASSISTANT

## 2019-07-30 PROCEDURE — 85610 PROTHROMBIN TIME: CPT

## 2019-07-30 PROCEDURE — 96374 THER/PROPH/DIAG INJ IV PUSH: CPT

## 2019-07-30 PROCEDURE — 36415 COLL VENOUS BLD VENIPUNCTURE: CPT

## 2019-07-30 PROCEDURE — 99283 EMERGENCY DEPT VISIT LOW MDM: CPT

## 2019-07-30 PROCEDURE — 2500000003 HC RX 250 WO HCPCS: Performed by: PHYSICIAN ASSISTANT

## 2019-07-30 RX ORDER — TRANEXAMIC ACID 100 MG/ML
1000 INJECTION, SOLUTION INTRAVENOUS ONCE
Status: COMPLETED | OUTPATIENT
Start: 2019-07-30 | End: 2019-07-30

## 2019-07-30 RX ADMIN — Medication: at 14:20

## 2019-07-30 RX ADMIN — TRANEXAMIC ACID 1000 MG: 100 INJECTION, SOLUTION INTRAVENOUS at 14:30

## 2019-07-30 NOTE — ED PROVIDER NOTES
fexofenadine (HM FEXOFENADINE HCL) 180 MG tablet Take 180 mg by mouth daily      fluticasone-vilanterol (BREO ELLIPTA) 100-25 MCG/INH AEPB Inhale 1 puff into the lungs daily After inhalation then gargle (Patient taking differently: Inhale 2 puffs into the lungs daily After inhalation then gargle) 1 each 12    acetaminophen 650 MG TABS Take 650 mg by mouth every 4 hours as needed 120 tablet 3    montelukast (SINGULAIR) 10 MG tablet Take 10 mg by mouth nightly.  pantoprazole (PROTONIX) 40 MG tablet Take 40 mg by mouth daily.  On med list of Dr Jesus Alberto Rosado dated 2014         ALLERGIES    Allergies   Allergen Reactions    Lidocaine Anaphylaxis    Aminophylline     Contrast [Iodides]      Patient states had IV contrast with negative results     Other      slobid    Pentazocine Lactate [Pentazocine]     Prednisone      Shakes, nausea    Theophyllines        SOCIAL & FAMILY HISTORY    Social History     Socioeconomic History    Marital status:      Spouse name: None    Number of children: None    Years of education: None    Highest education level: None   Occupational History    None   Social Needs    Financial resource strain: None    Food insecurity:     Worry: None     Inability: None    Transportation needs:     Medical: None     Non-medical: None   Tobacco Use    Smoking status: Former Smoker     Packs/day: 1.00     Years: 5.00     Pack years: 5.00     Last attempt to quit: 1970     Years since quittin.6    Smokeless tobacco: Never Used    Tobacco comment: smoked less than 1/2 pack- use to smoke a pipe- quit 1972   Substance and Sexual Activity    Alcohol use: Yes     Comment: rare alcohol;     CAFFEINE: coffee/ average drinking alcohol 6 itimes per year    Drug use: No    Sexual activity: Yes     Partners: Female   Lifestyle    Physical activity:     Days per week: None     Minutes per session: None    Stress: None   Relationships    Social connections:     Talks on

## 2019-07-30 NOTE — ED NOTES
Megha GARCIA and Dr Danica Camejo at bedside, attempting to cauterize wounds.       ESTEFANI's, RN  07/30/19 1142

## 2019-10-07 ENCOUNTER — OFFICE VISIT (OUTPATIENT)
Dept: PULMONOLOGY | Age: 79
End: 2019-10-07
Payer: MEDICARE

## 2019-10-07 VITALS
BODY MASS INDEX: 29.8 KG/M2 | DIASTOLIC BLOOD PRESSURE: 74 MMHG | SYSTOLIC BLOOD PRESSURE: 122 MMHG | WEIGHT: 173.6 LBS | HEART RATE: 73 BPM | OXYGEN SATURATION: 98 %

## 2019-10-07 DIAGNOSIS — J44.9 COPD, MILD (HCC): ICD-10-CM

## 2019-10-07 DIAGNOSIS — J45.30 MILD PERSISTENT ASTHMA WITHOUT COMPLICATION: Primary | ICD-10-CM

## 2019-10-07 DIAGNOSIS — R06.02 SOB (SHORTNESS OF BREATH): ICD-10-CM

## 2019-10-07 PROCEDURE — 99213 OFFICE O/P EST LOW 20 MIN: CPT | Performed by: INTERNAL MEDICINE

## 2019-10-07 RX ORDER — CLOPIDOGREL BISULFATE 75 MG/1
75 TABLET ORAL DAILY
COMMUNITY
End: 2020-07-21 | Stop reason: ALTCHOICE

## 2019-10-07 RX ORDER — FUROSEMIDE 40 MG/1
40 TABLET ORAL PRN
COMMUNITY

## 2019-10-07 RX ORDER — L-METHYLFOLATE-ALGAE-VIT B12-B6 CAP 3-90.314-2-35 MG 3-90.314-2-35 MG
1 CAP ORAL DAILY
COMMUNITY
End: 2020-07-21 | Stop reason: ALTCHOICE

## 2019-10-08 RX ORDER — FLUTICASONE FUROATE AND VILANTEROL 100; 25 UG/1; UG/1
1 POWDER RESPIRATORY (INHALATION) DAILY
Qty: 1 EACH | Refills: 0 | COMMUNITY
Start: 2019-10-08 | End: 2020-07-21 | Stop reason: SDUPTHER

## 2020-04-07 ENCOUNTER — VIRTUAL VISIT (OUTPATIENT)
Dept: PULMONOLOGY | Age: 80
End: 2020-04-07
Payer: MEDICARE

## 2020-04-07 PROCEDURE — 99442 PR PHYS/QHP TELEPHONE EVALUATION 11-20 MIN: CPT | Performed by: INTERNAL MEDICINE

## 2020-04-07 RX ORDER — ALBUTEROL SULFATE 90 UG/1
2 AEROSOL, METERED RESPIRATORY (INHALATION) EVERY 6 HOURS PRN
Qty: 1 INHALER | Refills: 11 | Status: SHIPPED | OUTPATIENT
Start: 2020-04-07 | End: 2020-12-15

## 2020-04-07 NOTE — PROGRESS NOTES
[address] [facility]monary   Virtual Visit Note      Kirt Avila is a 78 y.o. male evaluated via my chart/ telephone on 4/7/2020. Consent:  He and/or health care decision maker is aware that that he may receive a bill for this telephone service, depending on his insurance coverage, and has provided verbal consent to proceed    Documentation:  Kajal Yeh states just finished a Lt hip replacement. States that he is recovering from that well. He states that he had no pre or postoperative respiratory complications. He denies fever or chills or chest pain. He also denies any recent bronchitic infections. He continues on Breo daily and uses either a pro-air rescue inhaler or albuterol solution per nebulizer as needed. He states that with the increased pollen in the air he has required his rescue inhaler on several occasions. Past Medical History:   Diagnosis Date    A-fib [facility]) 3/31/14    Initial consult    Acute bronchospasm 9/20/2016    Acute exacerbation of chronic obstructive pulmonary disease (COPD) ([address] 1/16/2018    Asthma     Atrial fibrillation ([address]     Blood glucose elevated 12/17/2013    VE=923    Cancer ([address] dx 2003    prostrate- tx surg only/ tx for skin cancer of head now    Chest pain 3/14    COPD, mild ([address] 7/38/5459    Diastolic CHF (HCC)     Edema     Family history of diabetes mellitus     Brother    Fatigue 3/14    Glaucoma     \"watching this now\" Dr Rain Perea H/O cardiovascular stress test 4/14/14 5/14EF>55% Atrial septal occluder device noted. 4/14No evidence of hemodynamically significant coronary artery disease    H/O cardiovascular stress test 1/14/2016    lexiscan-normal,EF70%    H/O echocardiogram 02/12/15    EF 60% Mildly hypertrophied LV and LV systolic function. Mild MR & TR. Atrial septal occluder device is seen with is fuctioning normally.     H/O echocardiogram 1/14/2016    EF 60% aaortic root mildly dilated with dimension of 4.3    H/O transesophageal mouth nightly , Disp: , Rfl:     aspirin 81 MG tablet, Take 81 mg by mouth daily, Disp: , Rfl:     albuterol sulfate (PROAIR RESPICLICK) 440 (90 BASE) MCG/ACT aerosol powder inhalation, Inhale 2 puffs into the lungs as needed for Wheezing or Shortness of Breath , Disp: , Rfl:     fexofenadine (HM FEXOFENADINE HCL) 180 MG tablet, Take 180 mg by mouth daily, Disp: , Rfl:     fluticasone-vilanterol (BREO ELLIPTA) 100-25 MCG/INH AEPB, Inhale 1 puff into the lungs daily After inhalation then gargle (Patient taking differently: Inhale 2 puffs into the lungs daily After inhalation then gargle), Disp: 1 each, Rfl: 12    acetaminophen 650 MG TABS, Take 650 mg by mouth every 4 hours as needed, Disp: 120 tablet, Rfl: 3    montelukast (SINGULAIR) 10 MG tablet, Take 10 mg by mouth nightly., Disp: , Rfl:     pantoprazole (PROTONIX) 40 MG tablet, Take 40 mg by mouth daily. On med list of Dr Alonzo Staff dated 2/13/2014, Disp: , Rfl:     ROS:   Constitutional: No fever, no chills   Cardiovascular: No chest pain, no palpitations. Pulmonary: No cough, no SOB, no wheeze  Musculoskeletal:  No malaise,     Psychiatric/Behavioral: Negative for dysphoric mood         Diagnosis:  Mild persistent asthma without complication  Mild COPD  Shortness of breath      Plan:  I discussed with Mr. Bre Mejia about staying up-to-date with his medications and I will renew his pro-air rescue inhaler. He will return to clinic in 4 months and prior to that I will repeat his pulmonary function tests. We have discussed the need to maintain yearly flu immunization, pneumococcal vaccination. We have discussed Coronavirus precaution including handwashing practice, wiping items touched in public such as gas pumps, door handles, shopping carts, etc. Self monitoring for infection - fever, chills, cough, SOB. Should he develop symptoms he should call office for further instructions.     I affirm this is a Patient initiated episode with an established patient

## 2020-04-20 ENCOUNTER — VIRTUAL VISIT (OUTPATIENT)
Dept: PULMONOLOGY | Age: 80
End: 2020-04-20
Payer: MEDICARE

## 2020-04-20 VITALS
SYSTOLIC BLOOD PRESSURE: 120 MMHG | DIASTOLIC BLOOD PRESSURE: 67 MMHG | WEIGHT: 166 LBS | BODY MASS INDEX: 28.34 KG/M2 | HEART RATE: 73 BPM | HEIGHT: 64 IN

## 2020-04-20 PROCEDURE — 99441 PR PHYS/QHP TELEPHONE EVALUATION 5-10 MIN: CPT | Performed by: INTERNAL MEDICINE

## 2020-04-21 ENCOUNTER — HOSPITAL ENCOUNTER (OUTPATIENT)
Dept: GENERAL RADIOLOGY | Age: 80
Discharge: HOME | End: 2020-04-21
Payer: MEDICARE

## 2020-04-21 ENCOUNTER — HOSPITAL ENCOUNTER (OUTPATIENT)
Age: 80
Discharge: HOME | End: 2020-04-21
Payer: MEDICARE

## 2020-04-21 ENCOUNTER — OFFICE VISIT (OUTPATIENT)
Dept: PULMONOLOGY | Age: 80
End: 2020-04-21
Payer: MEDICARE

## 2020-04-21 VITALS
DIASTOLIC BLOOD PRESSURE: 66 MMHG | OXYGEN SATURATION: 94 % | BODY MASS INDEX: 28.51 KG/M2 | TEMPERATURE: 98 F | SYSTOLIC BLOOD PRESSURE: 120 MMHG | HEIGHT: 64 IN | WEIGHT: 167 LBS | HEART RATE: 67 BPM

## 2020-04-21 LAB
BASOPHILS ABSOLUTE: 0 K/CU MM
BASOPHILS RELATIVE PERCENT: 0.2 % (ref 0–1)
D DIMER: <200 NG/ML(DDU)
DIFFERENTIAL TYPE: ABNORMAL
EOSINOPHILS ABSOLUTE: 0 K/CU MM
EOSINOPHILS RELATIVE PERCENT: 0.4 % (ref 0–3)
HCT VFR BLD CALC: 44.8 % (ref 42–52)
HEMOGLOBIN: 14 GM/DL (ref 13.5–18)
IMMATURE NEUTROPHIL %: 0.5 % (ref 0–0.43)
LYMPHOCYTES ABSOLUTE: 1.3 K/CU MM
LYMPHOCYTES RELATIVE PERCENT: 12.9 % (ref 24–44)
MCH RBC QN AUTO: 29 PG (ref 27–31)
MCHC RBC AUTO-ENTMCNC: 31.3 % (ref 32–36)
MCV RBC AUTO: 92.9 FL (ref 78–100)
MONOCYTES ABSOLUTE: 1.1 K/CU MM
MONOCYTES RELATIVE PERCENT: 11.1 % (ref 0–4)
NUCLEATED RBC %: 0 %
PDW BLD-RTO: 12.9 % (ref 11.7–14.9)
PLATELET # BLD: 201 K/CU MM (ref 140–440)
PMV BLD AUTO: 10.6 FL (ref 7.5–11.1)
PRO-BNP: 260.4 PG/ML
RBC # BLD: 4.82 M/CU MM (ref 4.6–6.2)
REASON FOR REJECTION: NORMAL
REJECTED TEST: NORMAL
SEGMENTED NEUTROPHILS ABSOLUTE COUNT: 7.5 K/CU MM
SEGMENTED NEUTROPHILS RELATIVE PERCENT: 74.9 % (ref 36–66)
TOTAL IMMATURE NEUTOROPHIL: 0.05 K/CU MM
TOTAL NUCLEATED RBC: 0 K/CU MM
WBC # BLD: 10.1 K/CU MM (ref 4–10.5)

## 2020-04-21 PROCEDURE — 71046 X-RAY EXAM CHEST 2 VIEWS: CPT

## 2020-04-21 PROCEDURE — 99213 OFFICE O/P EST LOW 20 MIN: CPT | Performed by: INTERNAL MEDICINE

## 2020-04-21 PROCEDURE — 36415 COLL VENOUS BLD VENIPUNCTURE: CPT

## 2020-04-21 PROCEDURE — 85025 COMPLETE CBC W/AUTO DIFF WBC: CPT

## 2020-04-21 PROCEDURE — 83880 ASSAY OF NATRIURETIC PEPTIDE: CPT

## 2020-04-21 PROCEDURE — 85379 FIBRIN DEGRADATION QUANT: CPT

## 2020-04-21 NOTE — PROGRESS NOTES
SUBJECTIVE:  Chief Complaint: Worsening shortness of breath, mild persistent asthma, mild COPD  Mr. Sara Cardenas came back in our office today complaining of worsening shortness of breath. I had a telephone visit with him several days ago and did not change his current therapy which includes Breo daily and albuterol per MDI or solution to help control his mild persistent asthma and COPD. He denies fever but states that he has had some chills. He has not had worsening cough or purulent sputum expectoration. He is not complaining of chest pain or hemoptysis. He states he is very short of breath even with minimal exertion. He was concerned that he might qualify for the need for oxygen. ROS:  Constitution:  HEENT: Negative for ear, throat pain  Cardiovascular: Negative for chest pain, syncope, edema  Pulmonary: See HPI  Musculoskeletal: Negative for DVT, myalgias, arthralgias    OBJECTIVE:  /66   Pulse 67   Temp 98 °F (36.7 °C)   Ht 5' 4\" (1.626 m)   Wt 167 lb (75.8 kg)   SpO2 94%   BMI 28.67 kg/m²      Physical Exam:  Constitutional:  He appears well developed and well-nourished. Neck:  Supple, No palpable lymphadenopathy, No JVD  Cardiovascular:  S1, S2 Normal, Regular rhythm, no murmurs or gallops, No pericardial  rubs. Pulmonary: Breath sounds are fairly clear throughout all lung fields without wheezing or rhonchi and there is no pleural rubs  Abdomen: Not examined  Extremities: no edema, No DVT  Neurologic: Oriented x3, No focal deficits    Radiology: Chest x-ray on 3/13/2019 showed an enlarged cardiac silhouette with no acute cardiopulmonary abnormality  PFT: Office spirometry obtained on 6/10/2019 showed no significant airways obstruction and no significant response to bronchodilators      Echocardiogram:   Left ventricular systolic function is hyperdynamic. Ejection fraction is visually estimated at 75-80%. Mild concentric left ventricular hypertrophy. Grade I diastolic dysfunction. No regional wall motion abnormalities were detected. Atrial septal closure occluder device present. Agitated saline injection. Bubble study was negative. 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 No evidence of any pericardial effusion. Home oxygen evaluation:   O2 saturation is 97% on room air at rest  O2 saturation is 99% on room air while ambulating  ASSESSMENT:    1. Shortness of breath    2. SOB (shortness of breath)    3. Mild persistent asthma without complication    4. COPD, mild ([address]    5. Diastolic congestive heart failure, unspecified HF chronicity (HCC)          PLAN:   At this time he does not qualify for home oxygen but I will obtain a nighttime oxygen saturation study. I also would like to repeat his chest x-ray, d-dimer, BNP. I will not expand his bronchodilator therapy at this point. I will continue to follow him  Return in about 3 months (around 7/21/2020) for Recheck for COPD, Recheck for Asthma, Recheck for Shortness of Breath. This dictation was performed with a verbal recognition program and it was checked for errors. It is possible that there are still dictated errors within this office note. Any errors should be brought immediately to my attention for correction. All efforts were made to ensure that this office note is accurate.

## 2020-04-23 ENCOUNTER — HOSPITAL ENCOUNTER (OUTPATIENT)
Age: 80
Setting detail: SPECIMEN
Discharge: HOME | End: 2020-04-23
Payer: MEDICARE

## 2020-04-23 ENCOUNTER — TELEPHONE (OUTPATIENT)
Dept: PULMONOLOGY | Age: 80
End: 2020-04-23

## 2020-04-23 LAB
ANION GAP SERPL CALCULATED.3IONS-SCNC: 11 MMOL/L (ref 4–16)
BUN BLDV-MCNC: 22 MG/DL (ref 6–23)
CALCIUM SERPL-MCNC: 9.2 MG/DL (ref 8.3–10.6)
CHLORIDE BLD-SCNC: 99 MMOL/L (ref 99–110)
CO2: 26 MMOL/L (ref 21–32)
CREAT SERPL-MCNC: 1 MG/DL (ref 0.9–1.3)
GFR AFRICAN AMERICAN: >60 ML/MIN/1.73M2
GFR NON-AFRICAN AMERICAN: >60 ML/MIN/1.73M2
GLUCOSE BLD-MCNC: 83 MG/DL (ref 70–99)
POTASSIUM SERPL-SCNC: 4.7 MMOL/L (ref 3.5–5.1)
SODIUM BLD-SCNC: 136 MMOL/L (ref 135–145)

## 2020-04-23 PROCEDURE — 80048 BASIC METABOLIC PNL TOTAL CA: CPT

## 2020-07-14 ENCOUNTER — NURSE ONLY (OUTPATIENT)
Dept: PULMONOLOGY | Age: 80
End: 2020-07-14
Payer: MEDICARE

## 2020-07-14 LAB
EXPIRATORY TIME-POST: NORMAL
EXPIRATORY TIME: NORMAL
FEF 25-75% %CHNG: NORMAL
FEF 25-75% %PRED-POST: NORMAL
FEF 25-75% %PRED-PRE: NORMAL
FEF 25-75% PRED: NORMAL
FEF 25-75%-POST: NORMAL
FEF 25-75%-PRE: NORMAL
FEV1 %PRED-POST: 71.1 %
FEV1 %PRED-PRE: 73.1 %
FEV1 PRED: 2.05 L
FEV1-POST: 1.46 L
FEV1-PRE: 1.5 L
FEV1/FVC %PRED-POST: 105.8 %
FEV1/FVC %PRED-PRE: 109 %
FEV1/FVC PRED: 71.1 %
FEV1/FVC-POST: 75.3 %
FEV1/FVC-PRE: 77.5 %
FVC %PRED-POST: 65.2 L
FVC %PRED-PRE: 65 %
FVC PRED: 2.98 L
FVC-POST: 1.94 L
FVC-PRE: 1.94 L
PEF %PRED-POST: NORMAL
PEF %PRED-PRE: NORMAL
PEF PRED: NORMAL
PEF%CHNG: NORMAL
PEF-POST: NORMAL
PEF-PRE: NORMAL

## 2020-07-14 PROCEDURE — 94060 EVALUATION OF WHEEZING: CPT | Performed by: INTERNAL MEDICINE

## 2020-07-14 ASSESSMENT — PULMONARY FUNCTION TESTS
FVC_PERCENT_PREDICTED_POST: 65.2
FVC_POST: 1.94
FEV1_PRE: 1.50
FEV1_PREDICTED: 2.05
FEV1_POST: 1.46
FEV1/FVC_POST: 75.3
FEV1_PERCENT_PREDICTED_PRE: 73.1
FEV1_PERCENT_PREDICTED_POST: 71.1
FVC_PERCENT_PREDICTED_PRE: 65.0
FVC_PREDICTED: 2.98
FVC_PRE: 1.94
FEV1/FVC_PRE: 77.5
FEV1/FVC_PERCENT_PREDICTED_POST: 105.8
FEV1/FVC_PREDICTED: 71.1
FEV1/FVC_PERCENT_PREDICTED_PRE: 109.0

## 2020-07-14 NOTE — PROGRESS NOTES
Vanesa Andrea came to office for a PFT. The patients chief complaint is mild persistent asthma and mild COPD. He demonstrates an FEV1 of 1.50 L with an FVC of 1.94 liters. He demonstrates no significant obstructive lung defect. He shows no significant response to bronchodilators. Overall, his lung function has remained stable over the past year. I will make no changes to his current bronchodilator therapy. These results were discussed with him directly.   All questions answered

## 2020-07-21 ENCOUNTER — OFFICE VISIT (OUTPATIENT)
Dept: PULMONOLOGY | Age: 80
End: 2020-07-21
Payer: MEDICARE

## 2020-07-21 VITALS
SYSTOLIC BLOOD PRESSURE: 138 MMHG | WEIGHT: 167 LBS | HEART RATE: 67 BPM | DIASTOLIC BLOOD PRESSURE: 90 MMHG | OXYGEN SATURATION: 97 % | TEMPERATURE: 97.9 F | BODY MASS INDEX: 28.51 KG/M2 | HEIGHT: 64 IN

## 2020-07-21 PROCEDURE — 99213 OFFICE O/P EST LOW 20 MIN: CPT | Performed by: INTERNAL MEDICINE

## 2020-07-21 NOTE — PROGRESS NOTES
SUBJECTIVE:  Chief Complaint: Mild persistent asthma, mild COPD, shortness of breath, chronic diastolic CHF  Mr. Holly Neal states that he continues having episodes of shortness of breath. He has been told that he has been in mild congestive heart failure. He has had no recent asthmatic or bronchitic attacks. He continues on Breo daily and uses albuterol either per MDI or albuterol solution in his nebulizer as needed but he does not require this very often. He is not complaining of chest pain presently or having hemoptysis or purulent sputum expectoration. He was concerned he might need oxygen but he did not qualify for home oxygen. He has been socially distancing himself and has had no known COVID-19 exposure. He denies fever associated with any cough  He does follow-up with Dr. Kevin Pantoja in [place] for cardiology    ROS:  Constitution:  HEENT: Negative for ear, throat pain  Cardiovascular: Negative for chest pain, syncope, edema  Pulmonary: See HPI  Musculoskeletal: Negative for DVT, myalgias, arthralgias    OBJECTIVE:  BP (!) 138/90 (Site: Right Upper Arm, Position: Sitting, Cuff Size: Medium Adult)   Pulse 67   Temp 97.9 °F (36.6 °C) (Oral)   Ht 5' 4\" (1.626 m)   Wt 167 lb (75.8 kg)   SpO2 97%   BMI 28.67 kg/m²      Physical Exam:  Constitutional:  He appears well developed and well-nourished. Neck:  Supple, No palpable lymphadenopathy, No JVD  Cardiovascular:  S1, S2 Normal, Regular rhythm, short systolic murmur along left sternal border with a gallop sound, No pericardial  rubs. Pulmonary: Breath sounds are clear throughout all areas without wheezing or rhonchi  Abdomen: Not examined  Extremities: no edema, No DVT  Neurologic: Oriented x3, No focal deficits    Radiology: Chest x-ray on 4/21/2020 showed no acute findings  PFT: Office spirometry on 7/14/2020 demonstrated no significant airways obstruction with no significant response to bronchodilators.   Because his FEV1 and FVC were both mildly reduced I

## 2020-09-11 ENCOUNTER — TELEPHONE (OUTPATIENT)
Dept: PULMONOLOGY | Age: 80
End: 2020-09-11

## 2020-12-15 RX ORDER — FAMOTIDINE 40 MG/1
40 TABLET, FILM COATED ORAL DAILY
Status: ON HOLD | COMMUNITY
End: 2021-04-14 | Stop reason: HOSPADM

## 2020-12-15 RX ORDER — L-METHYLFOLATE-ALGAE-VIT B12-B6 CAP 3-90.314-2-35 MG 3-90.314-2-35 MG
1 CAP ORAL 2 TIMES DAILY
Status: ON HOLD | COMMUNITY
End: 2022-11-02 | Stop reason: HOSPADM

## 2020-12-15 NOTE — PROGRESS NOTES
1. Hx of anesthesia complications? -- no  2. Hx of difficult airway/intubation? -- no  3. Hx of changed chest pain/CHF exacerbation in last 6 mo. ? -- 3 weeks ago had heart cath and put him on Beta blocker  4. Home O2 dependent? -- no  5. Able to climb one flight of stairs w/o SOB? -- yes  6. Recent COPD exacerbation or pneumonia/bronchitis that has been treated in last      month? -- no      1. Do not eat or drink anything after 12 midnight (or____hours) unless instructed by your doctor prior to surgery. This includes no water, chewing gum or mints. NO chewing tobacco.  2. Follow your directions as prescribed by the doctor for your procedure and medications. 3.Check with your Doctor regarding stopping Plavix, Coumadin, Lovenox,Effient,Pradaxa,Xarelto, Fragmin or other blood thinners and follow their instructions. 4. Do not smoke, and do not drink any alcoholic beverages 24 hours prior to surgery. This includes NA Beer. 5. You may brush your teeth and gargle the morning of surgery. DO NOT SWALLOW WATER  6. You MUST make arrangements for a responsible adult to take you home after your surgery and be able to check on you every couple hours for the day. You will not be allowed     to leave alone or drive yourself home. It is strongly suggested someone stay with you the first 24 hrs. Your surgery will be cancelled if you do not have a ride home. 7. Please wear simple, loose fitting clothing to the hospital.  Vera Jamiling not bring valuables (money, credit cards, checkbooks, etc.) Do not wear any makeup (including no eye makeup) or nail polish on your fingers or toes. 8. DO NOT wear any jewelry or piercings on day of surgery. All body piercing jewelry must be removed  9. If you have dentures, they will be removed before going to the OR; we will provide you a container. If you wear contact lenses or glasses, they will be removed; please bring a case for them.   10. If you  have a Living Will and Durable Power of [employer] for Healthcare, please bring in a copy. 11 Please bring picture ID,  insurance card, paperwork from the doctors office    (H & P, Consent, & card for implantable devices).

## 2020-12-18 ENCOUNTER — HOSPITAL ENCOUNTER (OUTPATIENT)
Dept: LAB | Age: 80
Discharge: HOME | End: 2020-12-18
Payer: MEDICARE

## 2020-12-18 PROCEDURE — C9803 HOPD COVID-19 SPEC COLLECT: HCPCS

## 2020-12-18 PROCEDURE — U0002 COVID-19 LAB TEST NON-CDC: HCPCS

## 2020-12-19 LAB
SARS-COV-2: NOT DETECTED
SOURCE: NORMAL

## 2020-12-21 ENCOUNTER — ANESTHESIA EVENT (OUTPATIENT)
Dept: OPERATING ROOM | Age: 80
End: 2020-12-21
Payer: MEDICARE

## 2020-12-21 NOTE — ANESTHESIA PRE PROCEDURE
Department of Anesthesiology  Preprocedure Note       Name:  Rick Royal   Age:  78 y.o.  :  1940                                          MRN:  [id number]         Date:  2020      Surgeon: Flaco Harrison):  Aixa Hernandes MD    Procedure: Procedure(s):  EGD ESOPHAGOGASTRODUODENOSCOPY WITH DILATION    Medications prior to admission:   Prior to Admission medications    Medication Sig Start Date End Date Taking? Authorizing Provider   famotidine (PEPCID) 40 MG tablet Take 40 mg by mouth daily   Yes Historical Provider, MD   L-methylfolate-B6-B12 [facility]) [id number] MG CAPS capsule Take 1 capsule by mouth 2 times daily   Yes Historical Provider, MD   Magnesium 400 MG CAPS Take by mouth    Historical Provider, MD   guaiFENesin (BREONESIN PO) Take by mouth    Historical Provider, MD   nitroGLYCERIN (NITROSTAT) 0.4 MG SL tablet Place 0.4 mg under the tongue every 5 minutes as needed for Chest pain up to max of 3 total doses. If no relief after 1 dose, call 911. Historical Provider, MD   metoprolol tartrate (LOPRESSOR) 12.5 mg TABS Take 12.5 mg by mouth 2 times daily    Historical Provider, MD   furosemide (LASIX) 40 MG tablet Take 40 mg by mouth as needed    Historical Provider, MD   ALPRAZolam (XANAX) 0.25 MG tablet Take 0.5 mg by mouth nightly as needed for Sleep. Historical Provider, MD   albuterol (PROVENTIL) (2.5 MG/3ML) 0.083% nebulizer solution Take 3 mLs by nebulization every 6 hours as needed for Wheezing 19   Edgar Santa MD   predniSONE (DELTASONE) 5 MG tablet Take 5 PO now, 4 PO qam for 2 days, 2 PO qam for 2 days, 1 PO qam for 3 days, then STOP.   Patient not taking: Reported on 2020   Edgar Santa MD   apixaban [facility]) 2.5 MG TABS tablet Take 2 tablets by mouth 2 times daily 3/5/19   Bushra Duran MD   latanoprost (XALATAN) 0.005 % ophthalmic solution Place 1 drop into both eyes nightly 11/15/18   Historical Provider, MD atorvastatin (LIPITOR) 40 MG tablet Take 40 mg by mouth nightly  2/7/17   Historical Provider, MD   aspirin 81 MG tablet Take 81 mg by mouth daily    Historical Provider, MD   albuterol sulfate (PROAIR RESPICLICK) 428 (90 BASE) MCG/ACT aerosol powder inhalation Inhale 2 puffs into the lungs as needed for Wheezing or Shortness of Breath     Historical Provider, MD   fexofenadine (HM FEXOFENADINE HCL) 180 MG tablet Take 180 mg by mouth daily    Historical Provider, MD   fluticasone-vilanterol (BREO ELLIPTA) 100-25 MCG/INH AEPB Inhale 1 puff into the lungs daily After inhalation then gargle  Patient taking differently: Inhale 2 puffs into the lungs daily After inhalation then gargle 8/8/16   Herbert Costello MD   acetaminophen 650 MG TABS Take 650 mg by mouth every 4 hours as needed 7/26/15   Addis Wheeler DO   montelukast (SINGULAIR) 10 MG tablet Take 10 mg by mouth nightly. Historical Provider, MD   pantoprazole (PROTONIX) 40 MG tablet Take 40 mg by mouth daily. On med list of Dr Ayan Foley dated 2/13/2014    Historical Provider, MD       Current medications:    No current facility-administered medications for this encounter. Current Outpatient Medications   Medication Sig Dispense Refill    famotidine (PEPCID) 40 MG tablet Take 40 mg by mouth daily      L-methylfolate-B6-B12 (METANX) 3-90.314-2-35 MG CAPS capsule Take 1 capsule by mouth 2 times daily      Magnesium 400 MG CAPS Take by mouth      guaiFENesin (BREONESIN PO) Take by mouth      nitroGLYCERIN (NITROSTAT) 0.4 MG SL tablet Place 0.4 mg under the tongue every 5 minutes as needed for Chest pain up to max of 3 total doses. If no relief after 1 dose, call 911.  metoprolol tartrate (LOPRESSOR) 12.5 mg TABS Take 12.5 mg by mouth 2 times daily      furosemide (LASIX) 40 MG tablet Take 40 mg by mouth as needed      ALPRAZolam (XANAX) 0.25 MG tablet Take 0.5 mg by mouth nightly as needed for Sleep.  albuterol (PROVENTIL) (2.5 MG/3ML) 0.083% nebulizer solution Take 3 mLs by nebulization every 6 hours as needed for Wheezing 120 each 3    predniSONE (DELTASONE) 5 MG tablet Take 5 PO now, 4 PO qam for 2 days, 2 PO qam for 2 days, 1 PO qam for 3 days, then STOP. (Patient not taking: Reported on 12/14/2020) 20 tablet 0    apixaban (ELIQUIS) 2.5 MG TABS tablet Take 2 tablets by mouth 2 times daily 180 tablet 3    latanoprost (XALATAN) 0.005 % ophthalmic solution Place 1 drop into both eyes nightly      atorvastatin (LIPITOR) 40 MG tablet Take 40 mg by mouth nightly       aspirin 81 MG tablet Take 81 mg by mouth daily      albuterol sulfate (PROAIR RESPICLICK) 885 (90 BASE) MCG/ACT aerosol powder inhalation Inhale 2 puffs into the lungs as needed for Wheezing or Shortness of Breath       fexofenadine (HM FEXOFENADINE HCL) 180 MG tablet Take 180 mg by mouth daily      fluticasone-vilanterol (BREO ELLIPTA) 100-25 MCG/INH AEPB Inhale 1 puff into the lungs daily After inhalation then gargle (Patient taking differently: Inhale 2 puffs into the lungs daily After inhalation then gargle) 1 each 12    acetaminophen 650 MG TABS Take 650 mg by mouth every 4 hours as needed 120 tablet 3    montelukast (SINGULAIR) 10 MG tablet Take 10 mg by mouth nightly.  pantoprazole (PROTONIX) 40 MG tablet Take 40 mg by mouth daily. On med list of Dr Sharolyn Felty dated 2/13/2014         Allergies:     Allergies   Allergen Reactions    Aminophylline     Contrast [Iodides]      Patient states had IV contrast with negative results     Other      slobid    Pentazocine Lactate [Pentazocine]     Prednisone      Shakes, nausea    Theophyllines        Problem List:    Patient Active Problem List   Diagnosis Code    Pneumonia J18.9    Hypertension I10    Hyperlipemia E78.5    Chest pain on exertion R07.9    SOB (shortness of breath) R06.02    General weakness R53.1    PFO (patent foramen ovale) Q21.1  History of 24 hour EKG monitoring W04.61    Diastolic CHF (HCC) J28.56    Stable angina (HCC) I20.8    Edema R60.9    Groin discomfort R10.30    Hx of Doppler ultrasound Z92.89    Hx of Doppler ultrasound Z92.89    Ileus (HCC) K56.7    Mild persistent asthma without complication D02.63    Acute bronchospasm J98.01    COPD, mild (HCC) J44.9    Asthma exacerbation J45. 0    Hiatal hernia K44.9    Pancreatitis, unspecified pancreatitis type K85.90    Acute exacerbation of chronic obstructive pulmonary disease (COPD) (HCC) J44.1    Lung nodule seen on imaging study R91.1    TIA (transient ischemic attack) G45.9    Seizure ([address] R56.9    Altered mental status R41.82    Thoracic aortic aneurysm (HCC) I71.2       Past Medical History:        Diagnosis Date    A-fib ([address] 3/31/14    Initial consult    Acute bronchospasm 9/20/2016    Acute exacerbation of chronic obstructive pulmonary disease (COPD) ([address] 1/16/2018    Asthma     Atrial fibrillation ([address]     Blood glucose elevated 12/17/2013    UZ=383    Cancer ([address] dx 2003    prostrate- tx surg only/ tx for skin cancer of head now    Chest pain 3/14    COPD, mild ([address] 7/10/1021    Diastolic CHF ([address]     Edema     Family history of diabetes mellitus     Brother    Fatigue 3/14    Glaucoma     \"watching this now\" Dr Maxim Abdi H/O cardiovascular stress test 4/14/14 5/14EF>55% Atrial septal occluder device noted. 4/14No evidence of hemodynamically significant coronary artery disease    H/O cardiovascular stress test 1/14/2016    lexiscan-normal,EF70%    H/O echocardiogram 02/12/15    EF 60% Mildly hypertrophied LV and LV systolic function. Mild MR & TR. Atrial septal occluder device is seen with is fuctioning normally.     H/O echocardiogram 1/14/2016    EF 60% aaortic root mildly dilated with dimension of 4.3    H/O transesophageal echocardiography (HARLAN) for monitoring 5/22/2014    normal septal occluder     Hiatal hernia symptomatic    Prolonged emergence from general anesthesia     [facility]VD [facility]per[facility]     SOB (shortness of breath) 3/14    Stable angina ([facility])     Syncope and collapse     Thoracic aortic aneurysm (HCC)     4.5cm    Unspecified cerebral artery occlusion with cerebral infarction     multiple CVA's; weakness in right arm; has recurrent TIA's       Past Surgical History:        Procedure Laterality Date    CARDIAC SURGERY  2013-PFO closure by an Amplatzer 25mm Cribriform device in the intra-atrial septum- [id number] [place]  2019    one stint placement    CHOLECYSTECTOMY      lap choley-per old chart done [address]  2006    CYSTOSCOPY      with stone manipulation    ENDOSCOPY, COLON, DIAGNOSTIC  7/24/15    duodenal stricture,fundic gland polyp, dilation 10-15mm, hiatal hernia    ENDOSCOPY, COLON, DIAGNOSTIC  2017    mild hiatal hernia, duodenal obstruction, possible duodenal trauma r/t dilation, clips placed, stomach polyps x2    EYE SURGERY      per old chart right eye cataract ext with IOL   [address] [facility] Left     per old chart left ing hernia repair done     JOINT REPLACEMENT Left     hip   [address]    for deviation    OTHER SURGICAL HISTORY  2013    Dr. Radha Haines S# [id number] [place] [id number]    OTHER SURGICAL HISTORY  2010    EUA with drainage of rectal abscess    PROSTATECTOMY  2004    ROTATOR CUFF REPAIR Right 2008    SKIN BIOPSY      head squamous cell    TONSILLECTOMY  1950    VASECTOMY  1986       Social History:    Social History     Tobacco Use    Smoking status: Former Smoker     Packs/day: 1.00     Years: 5.00     Pack years: 5.00     Quit date: 1970     Years since quittin.0    Smokeless tobacco: Never Used  Tobacco comment: smoked less than 1/2 pack- use to smoke a pipe- quit 1972   Substance Use Topics    Alcohol use: Yes     Comment: rare alcohol;     CAFFEINE: coffee/ average drinking alcohol 6 itimes per year                                Counseling given: Not Answered  Comment: smoked less than 1/2 pack- use to smoke a pipe- quit 1972      Vital Signs (Current):   Vitals:    12/15/20 1252   Weight: 169 lb (76.7 kg)   Height: 5' 4\" (1.626 m)                                              BP Readings from Last 3 Encounters:   07/21/20 (!) 138/90   04/21/20 120/66   04/20/20 120/67       NPO Status:                                                                                 BMI:   Wt Readings from Last 3 Encounters:   12/14/20 169 lb (76.7 kg)   07/21/20 167 lb (75.8 kg)   04/21/20 167 lb (75.8 kg)     Body mass index is 29.01 kg/m². CBC:   Lab Results   Component Value Date    WBC 10.1 04/21/2020    RBC 4.82 04/21/2020    HGB 14.0 04/21/2020    HCT 44.8 04/21/2020    MCV 92.9 04/21/2020    RDW 12.9 04/21/2020     04/21/2020       CMP:   Lab Results   Component Value Date     04/23/2020    K 4.7 04/23/2020    CL 99 04/23/2020    CO2 26 04/23/2020    BUN 22 04/23/2020    CREATININE 1.0 04/23/2020    GFRAA >60 04/23/2020    LABGLOM >60 04/23/2020    GLUCOSE 83 04/23/2020    PROT 5.7 04/11/2019    PROT 6.2 12/11/2012    CALCIUM 9.2 04/23/2020    BILITOT 0.9 04/11/2019    ALKPHOS 68 04/11/2019    AST 31 04/11/2019    ALT 42 04/11/2019       POC Tests: No results for input(s): POCGLU, POCNA, POCK, POCCL, POCBUN, POCHEMO, POCHCT in the last 72 hours.     Coags:   Lab Results   Component Value Date    PROTIME 10.8 07/30/2019    INR 0.95 07/30/2019    APTT 22.3 07/30/2019       HCG (If Applicable): No results found for: PREGTESTUR, PREGSERUM, HCG, HCGQUANT     ABGs: No results found for: PHART, PO2ART, BJD9BON, CEC7DJB, BEART, D1YMRDHV     Type & Screen (If Applicable):

## 2020-12-23 ENCOUNTER — ANESTHESIA (OUTPATIENT)
Dept: OPERATING ROOM | Age: 80
End: 2020-12-23
Payer: MEDICARE

## 2020-12-23 ENCOUNTER — APPOINTMENT (OUTPATIENT)
Dept: GENERAL RADIOLOGY | Age: 80
End: 2020-12-23
Payer: MEDICARE

## 2020-12-23 ENCOUNTER — APPOINTMENT (OUTPATIENT)
Dept: CT IMAGING | Age: 80
End: 2020-12-23
Payer: MEDICARE

## 2020-12-23 ENCOUNTER — HOSPITAL ENCOUNTER (OUTPATIENT)
Age: 80
Setting detail: OUTPATIENT SURGERY
Discharge: ANOTHER HOSPITAL | End: 2020-12-23
Attending: SPECIALIST | Admitting: SPECIALIST
Payer: MEDICARE

## 2020-12-23 ENCOUNTER — HOSPITAL ENCOUNTER (EMERGENCY)
Age: 80
Discharge: HOME | End: 2020-12-23
Attending: EMERGENCY MEDICINE
Payer: MEDICARE

## 2020-12-23 VITALS
HEART RATE: 70 BPM | SYSTOLIC BLOOD PRESSURE: 125 MMHG | RESPIRATION RATE: 19 BRPM | OXYGEN SATURATION: 98 % | DIASTOLIC BLOOD PRESSURE: 71 MMHG | TEMPERATURE: 98.4 F

## 2020-12-23 VITALS — OXYGEN SATURATION: 98 % | SYSTOLIC BLOOD PRESSURE: 101 MMHG | DIASTOLIC BLOOD PRESSURE: 66 MMHG

## 2020-12-23 VITALS
OXYGEN SATURATION: 100 % | TEMPERATURE: 97 F | SYSTOLIC BLOOD PRESSURE: 160 MMHG | BODY MASS INDEX: 28.68 KG/M2 | WEIGHT: 168 LBS | HEIGHT: 64 IN | HEART RATE: 52 BPM | RESPIRATION RATE: 16 BRPM | DIASTOLIC BLOOD PRESSURE: 84 MMHG

## 2020-12-23 LAB
ALBUMIN SERPL-MCNC: 3.9 GM/DL (ref 3.4–5)
ALP BLD-CCNC: 63 IU/L (ref 40–128)
ALT SERPL-CCNC: 28 U/L (ref 10–40)
ANION GAP SERPL CALCULATED.3IONS-SCNC: 8 MMOL/L (ref 4–16)
AST SERPL-CCNC: 34 IU/L (ref 15–37)
BASOPHILS ABSOLUTE: 0 K/CU MM
BASOPHILS RELATIVE PERCENT: 0.8 % (ref 0–1)
BILIRUB SERPL-MCNC: 1.2 MG/DL (ref 0–1)
BUN BLDV-MCNC: 14 MG/DL (ref 6–23)
CALCIUM SERPL-MCNC: 8.5 MG/DL (ref 8.3–10.6)
CHLORIDE BLD-SCNC: 106 MMOL/L (ref 99–110)
CO2: 25 MMOL/L (ref 21–32)
CREAT SERPL-MCNC: 0.9 MG/DL (ref 0.9–1.3)
DIFFERENTIAL TYPE: ABNORMAL
EOSINOPHILS ABSOLUTE: 0.2 K/CU MM
EOSINOPHILS RELATIVE PERCENT: 2.8 % (ref 0–3)
GFR AFRICAN AMERICAN: >60 ML/MIN/1.73M2
GFR NON-AFRICAN AMERICAN: >60 ML/MIN/1.73M2
GLUCOSE BLD-MCNC: 95 MG/DL (ref 70–99)
HCT VFR BLD CALC: 47.2 % (ref 42–52)
HEMOGLOBIN: 15 GM/DL (ref 13.5–18)
IMMATURE NEUTROPHIL %: 0.4 % (ref 0–0.43)
INR BLD: 0.95 INDEX
LYMPHOCYTES ABSOLUTE: 1.5 K/CU MM
LYMPHOCYTES RELATIVE PERCENT: 28 % (ref 24–44)
MCH RBC QN AUTO: 29.5 PG (ref 27–31)
MCHC RBC AUTO-ENTMCNC: 31.8 % (ref 32–36)
MCV RBC AUTO: 92.7 FL (ref 78–100)
MONOCYTES ABSOLUTE: 0.6 K/CU MM
MONOCYTES RELATIVE PERCENT: 11.6 % (ref 0–4)
NUCLEATED RBC %: 0 %
PDW BLD-RTO: 13.3 % (ref 11.7–14.9)
PLATELET # BLD: 144 K/CU MM (ref 140–440)
PMV BLD AUTO: 9.9 FL (ref 7.5–11.1)
POTASSIUM SERPL-SCNC: 4.9 MMOL/L (ref 3.5–5.1)
PROTHROMBIN TIME: 11.5 SECONDS (ref 11.7–14.5)
RBC # BLD: 5.09 M/CU MM (ref 4.6–6.2)
SEGMENTED NEUTROPHILS ABSOLUTE COUNT: 3 K/CU MM
SEGMENTED NEUTROPHILS RELATIVE PERCENT: 56.4 % (ref 36–66)
SODIUM BLD-SCNC: 139 MMOL/L (ref 135–145)
TOTAL IMMATURE NEUTOROPHIL: 0.02 K/CU MM
TOTAL NUCLEATED RBC: 0 K/CU MM
TOTAL PROTEIN: 5.8 GM/DL (ref 6.4–8.2)
WBC # BLD: 5.3 K/CU MM (ref 4–10.5)

## 2020-12-23 PROCEDURE — 71250 CT THORAX DX C-: CPT

## 2020-12-23 PROCEDURE — 7100000011 HC PHASE II RECOVERY - ADDTL 15 MIN: Performed by: SPECIALIST

## 2020-12-23 PROCEDURE — 7100000010 HC PHASE II RECOVERY - FIRST 15 MIN: Performed by: SPECIALIST

## 2020-12-23 PROCEDURE — 3700000001 HC ADD 15 MINUTES (ANESTHESIA): Performed by: SPECIALIST

## 2020-12-23 PROCEDURE — 74018 RADEX ABDOMEN 1 VIEW: CPT

## 2020-12-23 PROCEDURE — 71045 X-RAY EXAM CHEST 1 VIEW: CPT

## 2020-12-23 PROCEDURE — 2580000003 HC RX 258: Performed by: SPECIALIST

## 2020-12-23 PROCEDURE — 74176 CT ABD & PELVIS W/O CONTRAST: CPT

## 2020-12-23 PROCEDURE — 96374 THER/PROPH/DIAG INJ IV PUSH: CPT

## 2020-12-23 PROCEDURE — 85025 COMPLETE CBC W/AUTO DIFF WBC: CPT

## 2020-12-23 PROCEDURE — C1726 CATH, BAL DIL, NON-VASCULAR: HCPCS | Performed by: SPECIALIST

## 2020-12-23 PROCEDURE — 80053 COMPREHEN METABOLIC PANEL: CPT

## 2020-12-23 PROCEDURE — 6360000002 HC RX W HCPCS: Performed by: PHYSICIAN ASSISTANT

## 2020-12-23 PROCEDURE — 36415 COLL VENOUS BLD VENIPUNCTURE: CPT

## 2020-12-23 PROCEDURE — 6360000002 HC RX W HCPCS: Performed by: NURSE ANESTHETIST, CERTIFIED REGISTERED

## 2020-12-23 PROCEDURE — 85610 PROTHROMBIN TIME: CPT

## 2020-12-23 PROCEDURE — 99284 EMERGENCY DEPT VISIT MOD MDM: CPT

## 2020-12-23 PROCEDURE — 96375 TX/PRO/DX INJ NEW DRUG ADDON: CPT

## 2020-12-23 PROCEDURE — 2709999900 HC NON-CHARGEABLE SUPPLY: Performed by: SPECIALIST

## 2020-12-23 PROCEDURE — 3609017700 HC EGD DILATION GASTRIC/DUODENAL STRICTURE: Performed by: SPECIALIST

## 2020-12-23 PROCEDURE — 3700000000 HC ANESTHESIA ATTENDED CARE: Performed by: SPECIALIST

## 2020-12-23 RX ORDER — LIDOCAINE HYDROCHLORIDE 20 MG/ML
INJECTION, SOLUTION INTRAVENOUS PRN
Status: DISCONTINUED | OUTPATIENT
Start: 2020-12-23 | End: 2020-12-23 | Stop reason: SDUPTHER

## 2020-12-23 RX ORDER — SODIUM CHLORIDE, SODIUM LACTATE, POTASSIUM CHLORIDE, CALCIUM CHLORIDE 600; 310; 30; 20 MG/100ML; MG/100ML; MG/100ML; MG/100ML
INJECTION, SOLUTION INTRAVENOUS CONTINUOUS
Status: DISCONTINUED | OUTPATIENT
Start: 2020-12-23 | End: 2020-12-23 | Stop reason: HOSPADM

## 2020-12-23 RX ORDER — PROPOFOL 10 MG/ML
INJECTION, EMULSION INTRAVENOUS PRN
Status: DISCONTINUED | OUTPATIENT
Start: 2020-12-23 | End: 2020-12-23 | Stop reason: SDUPTHER

## 2020-12-23 RX ORDER — MORPHINE SULFATE 4 MG/ML
2 INJECTION, SOLUTION INTRAMUSCULAR; INTRAVENOUS
Status: DISCONTINUED | OUTPATIENT
Start: 2020-12-23 | End: 2020-12-23 | Stop reason: HOSPADM

## 2020-12-23 RX ORDER — ONDANSETRON 2 MG/ML
4 INJECTION INTRAMUSCULAR; INTRAVENOUS ONCE
Status: DISCONTINUED | OUTPATIENT
Start: 2020-12-23 | End: 2020-12-23 | Stop reason: HOSPADM

## 2020-12-23 RX ADMIN — MORPHINE SULFATE 2 MG: 4 INJECTION, SOLUTION INTRAMUSCULAR; INTRAVENOUS at 11:46

## 2020-12-23 RX ADMIN — LIDOCAINE HYDROCHLORIDE 180 MG: 20 INJECTION, SOLUTION INTRAVENOUS at 09:23

## 2020-12-23 RX ADMIN — PROPOFOL 170 MG: 10 INJECTION, EMULSION INTRAVENOUS at 09:23

## 2020-12-23 RX ADMIN — SODIUM CHLORIDE, POTASSIUM CHLORIDE, SODIUM LACTATE AND CALCIUM CHLORIDE: 600; 310; 30; 20 INJECTION, SOLUTION INTRAVENOUS at 08:44

## 2020-12-23 ASSESSMENT — PAIN - FUNCTIONAL ASSESSMENT: PAIN_FUNCTIONAL_ASSESSMENT: 0-10

## 2020-12-23 ASSESSMENT — PAIN SCALES - GENERAL
PAINLEVEL_OUTOF10: 10
PAINLEVEL_OUTOF10: 0

## 2020-12-23 NOTE — BRIEF OP NOTE
BRIEF EGD REPORT:     Photos and full EGD report available by going to [facility] review\" then \"procedures\" then  \"EGD\" then \"View Endoscopy Report\"     IMPRESSION :   1) small hiatal hernia  2) esophageal ring at the SC junction- dilated as described  3) low-grade, benign-appearing stricture in the proximal  descending duopdenum- permitted tight passage of the  scope  4) otherwise normal    PLAN : repeat esophageal dilation as needed

## 2020-12-23 NOTE — FLOWSHEET NOTE
Opens eyes with stimulation. Remains drowsy. Grimaces in pain when abdomen touched. Abdomen slightly distended. Bowel sound present. Difficulty in communicating noted. Able to squeeze with both hands. Dr Bertin Posada and Hernan Pugh CRNA notified. States will be out to see him soon.

## 2020-12-23 NOTE — ED PROVIDER NOTES
I independently examined and evaluated Bri Church. In brief, 23-year-old male sent from surgery center with concern for complication from EGD. He had an EGD with 20 mm of dilation today, they were able to complete the procedure but afterward became severely distended, complaining of 10 out of 10 abdominal pain radiating into the chest.  He had been having normal bowel movements up until even having one this morning that was normal.  No nausea or vomiting. Pain is severe, denies relieving factors. Sent over by Dr. Vanesa Keane who had done the EGD. No report of having a colonoscopy. He had not had any pain medication on the way here. He is not on blood thinners    Focused exam revealed patient writhing on bed, significantly distended abdomen, diffusely tender and no peritoneal, tympanic with percussion. Regular rate and rhythm, nonlabored respirations other than seeming relates he is in pain, lungs are clear to auscultation bilaterally. No peripheral edema. ED course: Patient arrived with severely distended abdomen, tympanic, concern for possible complication from EGD, we were concerned for perforation given his initial presentation though his vitals are normal, chest x-ray and KUB were done at bedside  Dr. Vanesa Keane had called and spoken with charge nurse- would like called after imaging, Dr. Ruth Forbes is families preferred surgeon, he has already touched base with him and will call him if imaging positive. 1128- I looked at Pan Lee 3 at bedside, I do not see any large free air on CXR/KUB, however very distended colon loops, going for CT at this time, vitals are stable, morphine has been ordered.  Has IV dye allergy so getting without contrast. 1148- I am looking at images from CT- I do not see free air but there is bowel dilation with some fluid levels. On return from CT patient is stating he needs a commode, needs to have BM, having rapid stools now and distention is significantly improved, starting to feel better, morphine given and will continue to monitor, awaiting read on CT. ?maybe ileus/poor reaction to anesthesia/sedation meds? Will discuss with Dr. Nadja Marie what all he received once get CT reads back. 1203- CT negative for perforation, on repeat exam having flatulence and feeling much better, abdomen soft, nondistended now. Dr. Nadja Marie paged to discuss but patient continuing to improve. Discussed with Dr. Nadja Marie, patient is almost completely back to normal, feeling much better, palpating his own abdomen saying that he does not have any pain when I reevaluated him again. His white blood cell count is normal, INR is normal, awaiting chemistry. If continuing to improve after observation for a few hours given his age and the recent procedure we would plan for possible discharge home and patient is agreeable to this. 1320- patient asking to eat, I told nurse to wait until 2pm to ensure continuing to improve and adequate observation post procedure. If still doing well we will trial PO and plan for discharge home. All diagnostic, treatment, and disposition decisions were made by myself in conjunction with the advanced practice provider. For all further details of the patient's emergency department visit, please see the advanced practice provider's documentation. Comment: Please note this report has been produced using speech recognition software and may contain errors related to that system including errors in grammar, punctuation, and spelling, as well as words and phrases that may be inappropriate. If there are any questions or concerns please feel free to contact the dictating provider for clarification. Inge Castañeda MD  12/23/20 1325      CMP not concerning, plan for PO challenge and discharge     Inge Castañeda MD  12/23/20 1600

## 2020-12-23 NOTE — ED NOTES
Pt at CT, states he does not want pain medicine until back in room.       Aly Medina RN  12/23/20 7600

## 2020-12-23 NOTE — FLOWSHEET NOTE
Returned to room Q2. Report received from [address] Drowsy. Responds to stimulation. Opens eyes briefly with sternal rub and calling name loudly by VERONIQUE Gardner RN. Abdomen slightly distended and soft. Respirations even and unlabored. Color pink. Call light in reach.

## 2020-12-23 NOTE — FLOWSHEET NOTE
Abdomen more firm and distended. Bowel sounds active. Grimaces in pain when abdomen palpated. Able to move all extremities when instructed. Continues having difficulty with speaking.

## 2020-12-23 NOTE — ANESTHESIA POSTPROCEDURE EVALUATION
Department of Anesthesiology  Postprocedure Note    Patient: Leoncio Morales  MRN: [id number]  YOB: 1940  Date of evaluation: 12/23/2020  Time:  9:50 AM     Procedure Summary     Date: 12/23/20 Room / Location: [address]    Anesthesia Start: 0920 Anesthesia Stop: 2282    Procedure: EGD DILATION BALLOON 18-20 BALLOON DILATED TO 20 (N/A ) Diagnosis: (DYSPHAGIA AND HX OF DUODENAL STRICTURE)    Surgeons: Sara Montiel MD Responsible Provider: IFTIKHAR Corea CRNA    Anesthesia Type: MAC ASA Status: 3          Anesthesia Type: MAC    Anu Phase I: Anu Score: 10    Anu Phase II:      Last vitals: Reviewed and per EMR flowsheets.        Anesthesia Post Evaluation    Patient location during evaluation: bedside  Patient participation: complete - patient participated  Level of consciousness: sleepy but conscious  Pain score: 0  Airway patency: patent  Nausea & Vomiting: no nausea and no vomiting  Complications: no  Cardiovascular status: blood pressure returned to baseline  Respiratory status: acceptable, nonlabored ventilation, spontaneous ventilation and room air  Hydration status: euvolemic

## 2020-12-23 NOTE — ED NOTES
1200 paged Dr Johnson [facility]  12/23/20 1200  1207 Dr Ольга Valero returned call      Kathy Schlatter  12/23/20 1208

## 2020-12-23 NOTE — PROGRESS NOTES
Having severe pain after dilatation and abd distended and tender  will send to ED- will need CT to look for perforation  VSS

## 2020-12-23 NOTE — ED PROVIDER NOTES
EMERGENCY DEPARTMENT ENCOUNTER      PCP: Jan Lux MD    CHIEF COMPLAINT    No chief complaint on file. Of note, this patient was also evaluated by the attending physician, Dr. Sapna Moreno. HPI    Yoanna Mayo is a 78 y.o. male who presents with acute onset of abdominal pain status post EGD. Onset prior to arrival.  Context is patient had EGD performed and while in postoperative area developed abdominal pain. Pain is generalized and moderate to severe in intensity. Has associated nausea. No chest pain or shortness of breath. Denies any other symptoms. Last bowel movement this morning. REVIEW OF SYSTEMS    Constitutional:  Denies fever, chills, weight loss or weakness   HENT:  Denies sore throat or ear pain   Cardiovascular:  Denies chest pain, palpitations   Respiratory:  Denies cough or shortness of breath    GI: See HPI.   :  Denies any urinary symptoms  Musculoskeletal:  Denies back pain  Skin:  Denies rash  Neurologic:  Denies headache, focal weakness or sensory changes   Endocrine:  Denies polyuria or polydypsia   Lymphatic:  Denies swollen glands     All other review of systems are negative  See HPI and nursing notes for additional information     PAST MEDICAL AND SURGICAL HISTORY    Past Medical History:   Diagnosis Date    A-fib ([address] 3/31/14    Initial consult    Acute bronchospasm 9/20/2016    Acute exacerbation of chronic obstructive pulmonary disease (COPD) ([address] 1/16/2018    Asthma     Atrial fibrillation ([address]     Blood glucose elevated 12/17/2013    RV=340    Cancer ([address] dx 2003    prostrate- tx surg only/ tx for skin cancer of head now    Chest pain 3/14    COPD, mild ([address] 2/50/8699    Diastolic CHF ([address]     Edema     Family history of diabetes mellitus     Brother    Fatigue 3/14    Glaucoma     \"watching this now\" Dr Goodman Most H/O cardiovascular stress test 4/14/14 Lupe Kong OTHER MEDICAL 2/18/2014-2/24/2014 2/2014-([facility])- 7 day Event Monitor- Five events were available for review. these were all symptom prompted events. Predominant rhythm shows normal sinus rhythm. No significant arrhythmias recorded. -Dr Santiago Milner     \" I have to move at slow pace\"get sob with exertion    Hyperlipidemia     Hypertension     follows with Dr Benjy Gayle Hypoglycemia     \"dx with this years ago\"    Lung nodule seen on imaging study 1/16/2018    Mild persistent asthma without complication 5/61/9463    Paroxysmal atrial fibrillation (HCC)     symptomatic    Prolonged emergence from general anesthesia     PVD (peripheral vascular disease) ([address]     SOB (shortness of breath) 3/14    Stable angina (HCC)     Syncope and collapse     Thoracic aortic aneurysm ([address]     4.5cm    Unspecified cerebral artery occlusion with cerebral infarction     multiple CVA's; weakness in right arm; has recurrent TIA's     Past Surgical History:   Procedure Laterality Date    CARDIAC SURGERY  11/11/2013 11/2013-PFO closure by an Amplatzer 25mm Cribriform device in the intra-atrial septum- [id number] [place]  03/2019    one stint placement    CHOLECYSTECTOMY  2001    lap choley-per old chart done [id number]  2006    CYSTOSCOPY  1990's    with stone manipulation    ENDOSCOPY, COLON, DIAGNOSTIC  7/24/15    duodenal stricture,fundic gland polyp, dilation 10-15mm, hiatal hernia    ENDOSCOPY, COLON, DIAGNOSTIC  04/26/2017    mild hiatal hernia, duodenal obstruction, possible duodenal trauma r/t dilation, clips placed, stomach polyps x2    EYE SURGERY  2006    per old chart right eye cataract ext with IOL    [address]    per old chart left ing hernia repair done 1970's    JOINT REPLACEMENT Left     hip   [address]    for deviation    OTHER SURGICAL HISTORY  11/11/2013 Dr. Ghulam Candelario S# [id number] Model [id number]    OTHER SURGICAL HISTORY  2010    EUA with drainage of rectal abscess    PROSTATECTOMY  2004    ROTATOR CUFF REPAIR Right 2008    SKIN BIOPSY      head squamous cell    TONSILLECTOMY  1950    UPPER GASTROINTESTINAL ENDOSCOPY N/A 2020    EGD DILATION BALLOON 18-20 BALLOON DILATED TO 20 performed by Sara Montiel MD at [address] [place] [address]        ALLERGIES    Allergies   Allergen Reactions    Aminophylline     Contrast [Iodides]      Patient states had IV contrast with negative results     Other      slobid    Pentazocine Lactate [Pentazocine]     Prednisone      Shakes, nausea    Theophyllines        SOCIAL AND FAMILY HISTORY    Social History     Socioeconomic History    Marital status:      Spouse name: Not on file    Number of children: Not on file    Years of education: Not on file    Highest education level: Not on file   Occupational History    Not on file   Social Needs    Financial resource strain: Not on file    Food insecurity     Worry: Not on file     Inability: Not on file    Transportation needs     Medical: Not on file     Non-medical: Not on file   Tobacco Use    Smoking status: Former Smoker     Packs/day: 1.00     Years: 5.00     Pack years: 5.00     Quit date: 1970     Years since quittin.0    Smokeless tobacco: Never Used    Tobacco comment: smoked less than 1/2 pack- use to smoke a pipe- quit    Substance and Sexual Activity    Alcohol use: Yes     Comment: rare alcohol;     CAFFEINE: coffee/ average drinking alcohol 6 itimes per year    Drug use: No    Sexual activity: Yes     Partners: Female   Lifestyle    Physical activity     Days per week: Not on file     Minutes per session: Not on file    Stress: Not on file   Relationships    Social connections     Talks on phone: Not on file     Gets together: Not on file Attends Bahai service: Not on file     Active member of club or organization: Not on file     Attends meetings of clubs or organizations: Not on file     Relationship status: Not on file    Intimate partner violence     Fear of current or ex partner: Not on file     Emotionally abused: Not on file     Physically abused: Not on file     Forced sexual activity: Not on file   Other Topics Concern    Not on file   Social History Narrative    Not on file     Family History   Problem Relation Age of Onset    Heart Disease Father     Coronary Art Dis Father     Coronary Art Dis Brother     Heart Disease Sister     Diabetes Sister     Diabetes Sister     Diabetes Sister     Heart Disease Brother     Diabetes Brother     Heart Disease Brother          PHYSICAL EXAM    VITAL SIGNS: BP (!) 128/58   Pulse 59   Temp 98.4 °F (36.9 °C) (Oral)   Resp 17   SpO2 100%      Constitutional:  Well developed, Well nourished. Patient in obvious painmoaning due to generalized abdominal pain. HENT:  Normocephalic, Atraumatic, PERRL. EOMI. Sclera clear. Conjunctiva normal, No discharge. Neck/Lymphatics: supple, no JVD, no swollen nodes  Cardiovascular:   RRR,  no murmurs/rubs/gallops. No JVD    Respiratory:  Nonlabored breathing. Normal breath sounds, No wheezing  Abdomen: Patient has moderate to severely distended abdomen, rigidity and guarding in all 4 quadrants. There is tympanically to percussion, most notable in the upper quadrants. Musculoskeletal:    There is no edema, asymmetry, or calf / thigh tenderness bilaterally. No cyanosis. No cool or pale-appearing limb.   Distal cap refill and pulses intact bilateral upper and lower extremities  Bilateral upper and lower extremity ROM intact without pain or obvious deficit  Integument:  Warm, Dry Neurologic: Alert & oriented , No focal deficits noted. Cranial nerves II through XII grossly intact. Normal gross motor coordination & motor strength bilateral upper and lower extremities  Sensation intact.   Psychiatric:  Affect normal, Mood normal.       Labs:  Results for orders placed or performed during the hospital encounter of 12/23/20   CBC Auto Differential   Result Value Ref Range    WBC 5.3 4.0 - 10.5 K/CU MM    RBC 5.09 4.6 - 6.2 M/CU MM    Hemoglobin 15.0 13.5 - 18.0 GM/DL    Hematocrit 47.2 42 - 52 %    MCV 92.7 78 - 100 FL    MCH 29.5 27 - 31 PG    MCHC 31.8 (L) 32.0 - 36.0 %    RDW 13.3 11.7 - 14.9 %    Platelets 288 706 - 660 K/CU MM    MPV 9.9 7.5 - 11.1 FL    Differential Type AUTOMATED DIFFERENTIAL     Segs Relative 56.4 36 - 66 %    Lymphocytes % 28.0 24 - 44 %    Monocytes % 11.6 (H) 0 - 4 %    Eosinophils % 2.8 0 - 3 %    Basophils % 0.8 0 - 1 %    Segs Absolute 3.0 K/CU MM    Lymphocytes Absolute 1.5 K/CU MM    Monocytes Absolute 0.6 K/CU MM    Eosinophils Absolute 0.2 K/CU MM    Basophils Absolute 0.0 K/CU MM    Nucleated RBC % 0.0 %    Total Nucleated RBC 0.0 K/CU MM    Total Immature Neutrophil 0.02 K/CU MM    Immature Neutrophil % 0.4 0 - 0.43 %   Comprehensive Metabolic Panel   Result Value Ref Range    Sodium 139 135 - 145 MMOL/L    Potassium 4.9 3.5 - 5.1 MMOL/L    Chloride 106 99 - 110 mMol/L    CO2 25 21 - 32 MMOL/L    BUN 14 6 - 23 MG/DL    CREATININE 0.9 0.9 - 1.3 MG/DL    Glucose 95 70 - 99 MG/DL    Calcium 8.5 8.3 - 10.6 MG/DL    Alb 3.9 3.4 - 5.0 GM/DL    Total Protein 5.8 (L) 6.4 - 8.2 GM/DL    Total Bilirubin 1.2 (H) 0.0 - 1.0 MG/DL    ALT 28 10 - 40 U/L    AST 34 15 - 37 IU/L    Alkaline Phosphatase 63 40 - 128 IU/L    GFR Non-African American >60 >60 mL/min/1.73m2    GFR African American >60 >60 mL/min/1.73m2    Anion Gap 8 4 - 16   Protime-INR   Result Value Ref Range    Protime 11.5 (L) 11.7 - 14.5 SECONDS    INR 0.95 INDEX           RADIOLOGY    XR CHEST PORTABLE Final Result   Stable cardiomegaly. XR ABDOMEN (KUB) (SINGLE AP VIEW)   Final Result   Nonspecific, nonobstructive bowel gas pattern. CT ABDOMEN PELVIS WO CONTRAST Additional Contrast? None   Preliminary Result   1. No acute abnormalities in the chest, abdomen or pelvis. 2. Distention of large bowel likely related to recent procedure. Mild   diverticulosis but no acute diverticulitis. 3. Ectatic ascending aorta and mild calcification of the aortic valve. Mild   cardiomegaly. 4. Mild superior endplate compression at T8 and T9 which are likely chronic. CT CHEST WO CONTRAST   Preliminary Result   1. No acute abnormalities in the chest, abdomen or pelvis. 2. Distention of large bowel likely related to recent procedure. Mild   diverticulosis but no acute diverticulitis. 3. Ectatic ascending aorta and mild calcification of the aortic valve. Mild   cardiomegaly. 4. Mild superior endplate compression at T8 and T9 which are likely chronic. ED COURSE & MEDICAL DECISION MAKING       Presents as above with abdominal distention and associated pain and tenderness to palpation as well as tympany to percussion. We immediately ordered chest x-ray and KUB which was reviewed at bedside and patient was immediately sent to CT scan. CT reveals no evidence of perforation or other intra-abdominal abnormality with exception of intraluminal air consistent with postoperative distention related to recent procedure. Throughout ED course patient had copious amount of flatulence and notes immediate improvement of symptoms. Patient observed for greater than 2 hours and continues to improve with passing of gas. This is likely the cause of patient's symptoms. P.o. fluid challenge done in the ED and patient tolerates well without recurrence of pain. This was all reviewed with patient's gastroenterologist, Dr. Devon Her, by my supervising physician today. He is aware of ED results. He is agreeable to plan of observation and discharge home patient continues to improve to resolution. Patient agrees to return emergency department if symptoms return or any new symptoms develop. Clinical  IMPRESSION    1. Generalized abdominal pain          Comment: Please note this report has been produced using speech recognition software and may contain errors related to that system including errors in grammar, punctuation, and spelling, as well as words and phrases that may be inappropriate. If there are any questions or concerns please feel free to contact the dictating provider for clarification.          Jose Alejandro Suero, [address]  12/23/20 5209

## 2020-12-23 NOTE — FLOWSHEET NOTE
Nell Lyons CRNA at bedside to see patient. 65- Dr Eliazar Gonzales at bedside to see patient. Patient continues to grimace in pain when abdomen touched. Continues have difficulty when communicating. Instructed per Dr Eliazar Gonzales to send patient to ER. Report called to ER per Dr Eliazar Gonzales. 1400 [facility] called per Kenyon Meyer RN.

## 2021-02-18 ENCOUNTER — OFFICE VISIT (OUTPATIENT)
Dept: PULMONOLOGY | Age: 81
End: 2021-02-18
Payer: MEDICARE

## 2021-02-18 VITALS
OXYGEN SATURATION: 99 % | WEIGHT: 168 LBS | DIASTOLIC BLOOD PRESSURE: 72 MMHG | HEART RATE: 57 BPM | SYSTOLIC BLOOD PRESSURE: 120 MMHG | HEIGHT: 64 IN | BODY MASS INDEX: 28.68 KG/M2

## 2021-02-18 DIAGNOSIS — J45.30 MILD PERSISTENT ASTHMA WITHOUT COMPLICATION: Primary | ICD-10-CM

## 2021-02-18 DIAGNOSIS — R06.02 SOB (SHORTNESS OF BREATH): ICD-10-CM

## 2021-02-18 DIAGNOSIS — J44.9 COPD, MILD (HCC): ICD-10-CM

## 2021-02-18 PROCEDURE — 99213 OFFICE O/P EST LOW 20 MIN: CPT | Performed by: INTERNAL MEDICINE

## 2021-02-18 RX ORDER — FLUTICASONE FUROATE AND VILANTEROL TRIFENATATE 100; 25 UG/1; UG/1
1 POWDER RESPIRATORY (INHALATION) DAILY
Qty: 3 EACH | Refills: 3 | Status: SHIPPED | OUTPATIENT
Start: 2021-02-18 | End: 2022-01-24 | Stop reason: SDUPTHER

## 2021-02-18 RX ORDER — FLUTICASONE FUROATE AND VILANTEROL TRIFENATATE 100; 25 UG/1; UG/1
1 POWDER RESPIRATORY (INHALATION) DAILY
Qty: 1 EACH | Refills: 11 | Status: SHIPPED | OUTPATIENT
Start: 2021-02-18 | End: 2021-03-10 | Stop reason: SDUPTHER

## 2021-02-18 NOTE — PROGRESS NOTES
SUBJECTIVE:  Chief Complaint: Mild persistent asthma, mild COPD, shortness of breath  Nisha Murphy states that he had an episode of chest pain in late December 2020 and was evaluated emergency room. He eventually had a cardiac catheterization which was clean and was referred to Dr. Mariah Fonseca for endoscopy and required dilation. He is on Protonix daily and Pepcid as needed. Other than his voice being slightly husky in feeding he is not  having any other specific complaints. He denies worsening shortness of breath or chest congestion. He continues on Breo daily and albuterol rescue as needed. He is not on home oxygen  He did receive both Pfizer COVID-19 vaccinations and tolerated this quite well. ROS:  Constitution:  HEENT: Negative for ear, throat pain  Cardiovascular: Negative for chest pain, syncope, edema  Pulmonary: See HPI  Musculoskeletal: Negative for DVT, myalgias, arthralgias    OBJECTIVE:  /72 (Site: Right Upper Arm, Position: Sitting, Cuff Size: Large Adult)   Pulse 57   Ht 5' 4\" (1.626 m)   Wt 168 lb (76.2 kg)   SpO2 99%   BMI 28.84 kg/m²      Physical Exam:  Constitutional:  He appears well developed and well-nourished. Neck:  Supple, No palpable lymphadenopathy, No JVD, voice quality appears to be normal  Cardiovascular:  S1, S2 Normal, Regular rhythm, no murmurs or gallops, No pericardial  rubs.   Pulmonary: Breath sounds are clear throughout all areas without wheezing or rhonchi  Abdomen: Not examined  Extremities: no edema, No DVT  Neurologic: Oriented x3, No focal deficits    Radiology: Chest x-ray 12/23/2020 showed stable cardiomegaly  PFT: Office spirometry 7/14/2020 demonstrated no significant airways obstruction and no significant response to bronchodilators      Echocardiogram: Echo on 2/28/2019 showed normal left ventricular function with grade 1 diastolic dysfunction, left ventricular hypertrophy and aneurysmal dilation of the ascending aorta of 4.0 cm    ASSESSMENT:    1. Mild persistent asthma without complication    2. COPD, mild ([address]    3. SOB (shortness of breath)          PLAN:   Is chronic asthma appears to be under fairly good control and I will make no changes in his current therapy. I did encourage him to gargle after using his Breo. I will continue to follow him    We have discussed the need to maintain yearly flu immunization, pneumococcal vaccination. We have discussed Coronavirus precaution including handwashing practice, wiping items touched in public such as gas pumps, door handles, shopping carts, etc. Self monitoring for infection - fever, chills, cough, SOB. Should they develop symptoms they should call office for further instructions. Return in about 6 months (around 8/18/2021) for Recheck for COPD, Recheck for Asthma, Recheck for Shortness of Breath. This dictation was performed with a verbal recognition program and it was checked for errors. It is possible that there are still dictated errors within this office note. Any errors should be brought immediately to my attention for correction. All efforts were made to ensure that this office note is accurate.

## 2021-03-11 RX ORDER — FLUTICASONE FUROATE AND VILANTEROL TRIFENATATE 100; 25 UG/1; UG/1
1 POWDER RESPIRATORY (INHALATION) DAILY
Qty: 90 EACH | Refills: 3 | Status: SHIPPED | OUTPATIENT
Start: 2021-03-11 | End: 2022-01-24 | Stop reason: SDUPTHER

## 2021-04-09 ENCOUNTER — TELEPHONE (OUTPATIENT)
Dept: PULMONOLOGY | Age: 81
End: 2021-04-09

## 2021-04-09 ENCOUNTER — HOSPITAL ENCOUNTER (OUTPATIENT)
Age: 81
Setting detail: OBSERVATION
Discharge: HOME | End: 2021-04-14
Attending: EMERGENCY MEDICINE | Admitting: INTERNAL MEDICINE
Payer: MEDICARE

## 2021-04-09 ENCOUNTER — APPOINTMENT (OUTPATIENT)
Dept: CT IMAGING | Age: 81
End: 2021-04-09
Payer: MEDICARE

## 2021-04-09 ENCOUNTER — APPOINTMENT (OUTPATIENT)
Dept: MRI IMAGING | Age: 81
End: 2021-04-09
Payer: MEDICARE

## 2021-04-09 ENCOUNTER — VIRTUAL VISIT (OUTPATIENT)
Dept: PULMONOLOGY | Age: 81
End: 2021-04-09
Payer: MEDICARE

## 2021-04-09 ENCOUNTER — APPOINTMENT (OUTPATIENT)
Dept: GENERAL RADIOLOGY | Age: 81
End: 2021-04-09
Payer: MEDICARE

## 2021-04-09 DIAGNOSIS — R06.02 SOB (SHORTNESS OF BREATH): ICD-10-CM

## 2021-04-09 DIAGNOSIS — J45.31 MILD PERSISTENT ASTHMA WITH EXACERBATION: Primary | ICD-10-CM

## 2021-04-09 DIAGNOSIS — J44.9 COPD, MILD (HCC): ICD-10-CM

## 2021-04-09 DIAGNOSIS — R47.01 NONVERBAL: ICD-10-CM

## 2021-04-09 DIAGNOSIS — R41.82 ALTERED MENTAL STATUS, UNSPECIFIED ALTERED MENTAL STATUS TYPE: Primary | ICD-10-CM

## 2021-04-09 PROBLEM — G93.40 ENCEPHALOPATHY ACUTE: Status: ACTIVE | Noted: 2021-04-09

## 2021-04-09 LAB
ALBUMIN SERPL-MCNC: 3.8 GM/DL (ref 3.4–5)
ALP BLD-CCNC: 74 IU/L (ref 40–129)
ALT SERPL-CCNC: 28 U/L (ref 10–40)
ANION GAP SERPL CALCULATED.3IONS-SCNC: 9 MMOL/L (ref 4–16)
AST SERPL-CCNC: 39 IU/L (ref 15–37)
BACTERIA: NEGATIVE /HPF
BASOPHILS ABSOLUTE: 0 K/CU MM
BASOPHILS RELATIVE PERCENT: 0.5 % (ref 0–1)
BILIRUB SERPL-MCNC: 1.3 MG/DL (ref 0–1)
BILIRUBIN URINE: NEGATIVE MG/DL
BLOOD, URINE: NEGATIVE
BUN BLDV-MCNC: 19 MG/DL (ref 6–23)
CALCIUM SERPL-MCNC: 8.9 MG/DL (ref 8.3–10.6)
CHLORIDE BLD-SCNC: 100 MMOL/L (ref 99–110)
CLARITY: CLEAR
CO2: 25 MMOL/L (ref 21–32)
COLOR: ABNORMAL
CREAT SERPL-MCNC: 0.9 MG/DL (ref 0.9–1.3)
DIFFERENTIAL TYPE: ABNORMAL
EKG ATRIAL RATE: 70 BPM
EKG DIAGNOSIS: NORMAL
EKG P AXIS: 45 DEGREES
EKG P-R INTERVAL: 196 MS
EKG Q-T INTERVAL: 402 MS
EKG QRS DURATION: 114 MS
EKG QTC CALCULATION (BAZETT): 434 MS
EKG R AXIS: -42 DEGREES
EKG T AXIS: 5 DEGREES
EKG VENTRICULAR RATE: 70 BPM
EOSINOPHILS ABSOLUTE: 0.2 K/CU MM
EOSINOPHILS RELATIVE PERCENT: 3.6 % (ref 0–3)
GFR AFRICAN AMERICAN: >60 ML/MIN/1.73M2
GFR NON-AFRICAN AMERICAN: >60 ML/MIN/1.73M2
GLUCOSE BLD-MCNC: 115 MG/DL (ref 70–99)
GLUCOSE BLD-MCNC: 151 MG/DL (ref 70–99)
GLUCOSE, URINE: NEGATIVE MG/DL
HCT VFR BLD CALC: 50.4 % (ref 42–52)
HEMOGLOBIN: 16.1 GM/DL (ref 13.5–18)
IMMATURE NEUTROPHIL %: 0.2 % (ref 0–0.43)
INR BLD: 1.17 INDEX
KETONES, URINE: NEGATIVE MG/DL
LACTATE: 1 MMOL/L (ref 0.4–2)
LACTATE: 2.1 MMOL/L (ref 0.4–2)
LEUKOCYTE ESTERASE, URINE: NEGATIVE
LYMPHOCYTES ABSOLUTE: 1.5 K/CU MM
LYMPHOCYTES RELATIVE PERCENT: 25.9 % (ref 24–44)
MAGNESIUM: 2.1 MG/DL (ref 1.8–2.4)
MCH RBC QN AUTO: 30.2 PG (ref 27–31)
MCHC RBC AUTO-ENTMCNC: 31.9 % (ref 32–36)
MCV RBC AUTO: 94.6 FL (ref 78–100)
MONOCYTES ABSOLUTE: 0.4 K/CU MM
MONOCYTES RELATIVE PERCENT: 6.2 % (ref 0–4)
MUCUS: ABNORMAL HPF
NITRITE URINE, QUANTITATIVE: NEGATIVE
NUCLEATED RBC %: 0 %
PDW BLD-RTO: 12.3 % (ref 11.7–14.9)
PH, URINE: 7 (ref 5–8)
PLATELET # BLD: 154 K/CU MM (ref 140–440)
PMV BLD AUTO: 9.8 FL (ref 7.5–11.1)
POTASSIUM SERPL-SCNC: 3.8 MMOL/L (ref 3.5–5.1)
PROTEIN UA: NEGATIVE MG/DL
PROTHROMBIN TIME: 14.2 SECONDS (ref 11.7–14.5)
RBC # BLD: 5.33 M/CU MM (ref 4.6–6.2)
RBC URINE: 1 /HPF (ref 0–3)
SARS-COV-2, NAAT: NOT DETECTED
SEGMENTED NEUTROPHILS ABSOLUTE COUNT: 3.6 K/CU MM
SEGMENTED NEUTROPHILS RELATIVE PERCENT: 63.6 % (ref 36–66)
SODIUM BLD-SCNC: 134 MMOL/L (ref 135–145)
SOURCE: NORMAL
SPECIFIC GRAVITY UA: 1.02 (ref 1–1.03)
TOTAL IMMATURE NEUTOROPHIL: 0.01 K/CU MM
TOTAL NUCLEATED RBC: 0 K/CU MM
TOTAL PROTEIN: 6.6 GM/DL (ref 6.4–8.2)
TRICHOMONAS: ABNORMAL /HPF
TROPONIN T: <0.01 NG/ML
TROPONIN T: <0.01 NG/ML
UROBILINOGEN, URINE: NEGATIVE MG/DL (ref 0.2–1)
WBC # BLD: 5.6 K/CU MM (ref 4–10.5)
WBC UA: <1 /HPF (ref 0–2)

## 2021-04-09 PROCEDURE — 70551 MRI BRAIN STEM W/O DYE: CPT

## 2021-04-09 PROCEDURE — 80053 COMPREHEN METABOLIC PANEL: CPT

## 2021-04-09 PROCEDURE — 85025 COMPLETE CBC W/AUTO DIFF WBC: CPT

## 2021-04-09 PROCEDURE — 85610 PROTHROMBIN TIME: CPT

## 2021-04-09 PROCEDURE — 6370000000 HC RX 637 (ALT 250 FOR IP): Performed by: INTERNAL MEDICINE

## 2021-04-09 PROCEDURE — 6360000004 HC RX CONTRAST MEDICATION: Performed by: EMERGENCY MEDICINE

## 2021-04-09 PROCEDURE — 93010 ELECTROCARDIOGRAM REPORT: CPT | Performed by: INTERNAL MEDICINE

## 2021-04-09 PROCEDURE — G0378 HOSPITAL OBSERVATION PER HR: HCPCS

## 2021-04-09 PROCEDURE — 96375 TX/PRO/DX INJ NEW DRUG ADDON: CPT

## 2021-04-09 PROCEDURE — 2580000003 HC RX 258: Performed by: INTERNAL MEDICINE

## 2021-04-09 PROCEDURE — 87635 SARS-COV-2 COVID-19 AMP PRB: CPT

## 2021-04-09 PROCEDURE — 84484 ASSAY OF TROPONIN QUANT: CPT

## 2021-04-09 PROCEDURE — 83735 ASSAY OF MAGNESIUM: CPT

## 2021-04-09 PROCEDURE — 93005 ELECTROCARDIOGRAM TRACING: CPT | Performed by: INTERNAL MEDICINE

## 2021-04-09 PROCEDURE — 71045 X-RAY EXAM CHEST 1 VIEW: CPT

## 2021-04-09 PROCEDURE — 70450 CT HEAD/BRAIN W/O DYE: CPT

## 2021-04-09 PROCEDURE — 94761 N-INVAS EAR/PLS OXIMETRY MLT: CPT

## 2021-04-09 PROCEDURE — 81001 URINALYSIS AUTO W/SCOPE: CPT

## 2021-04-09 PROCEDURE — G0378 HOSPITAL OBSERVATION PER HR: HCPCS | Performed by: SPECIALIST

## 2021-04-09 PROCEDURE — 93005 ELECTROCARDIOGRAM TRACING: CPT | Performed by: EMERGENCY MEDICINE

## 2021-04-09 PROCEDURE — 70496 CT ANGIOGRAPHY HEAD: CPT

## 2021-04-09 PROCEDURE — 96374 THER/PROPH/DIAG INJ IV PUSH: CPT

## 2021-04-09 PROCEDURE — 36415 COLL VENOUS BLD VENIPUNCTURE: CPT

## 2021-04-09 PROCEDURE — 6360000002 HC RX W HCPCS: Performed by: EMERGENCY MEDICINE

## 2021-04-09 PROCEDURE — 99285 EMERGENCY DEPT VISIT HI MDM: CPT

## 2021-04-09 PROCEDURE — 83605 ASSAY OF LACTIC ACID: CPT

## 2021-04-09 PROCEDURE — 87040 BLOOD CULTURE FOR BACTERIA: CPT

## 2021-04-09 PROCEDURE — 82962 GLUCOSE BLOOD TEST: CPT

## 2021-04-09 PROCEDURE — 99214 OFFICE O/P EST MOD 30 MIN: CPT | Performed by: INTERNAL MEDICINE

## 2021-04-09 RX ORDER — ASPIRIN 81 MG/1
81 TABLET ORAL DAILY
Status: DISCONTINUED | OUTPATIENT
Start: 2021-04-09 | End: 2021-04-14 | Stop reason: HOSPADM

## 2021-04-09 RX ORDER — SODIUM CHLORIDE 9 MG/ML
25 INJECTION, SOLUTION INTRAVENOUS PRN
Status: DISCONTINUED | OUTPATIENT
Start: 2021-04-09 | End: 2021-04-14 | Stop reason: HOSPADM

## 2021-04-09 RX ORDER — MONTELUKAST SODIUM 10 MG/1
10 TABLET ORAL NIGHTLY
Status: DISCONTINUED | OUTPATIENT
Start: 2021-04-09 | End: 2021-04-14 | Stop reason: HOSPADM

## 2021-04-09 RX ORDER — PANTOPRAZOLE SODIUM 40 MG/1
40 TABLET, DELAYED RELEASE ORAL DAILY
Status: DISCONTINUED | OUTPATIENT
Start: 2021-04-10 | End: 2021-04-11

## 2021-04-09 RX ORDER — ACETAMINOPHEN 650 MG/1
650 SUPPOSITORY RECTAL EVERY 6 HOURS PRN
Status: DISCONTINUED | OUTPATIENT
Start: 2021-04-09 | End: 2021-04-14 | Stop reason: HOSPADM

## 2021-04-09 RX ORDER — SODIUM CHLORIDE 0.9 % (FLUSH) 0.9 %
5-40 SYRINGE (ML) INJECTION EVERY 12 HOURS SCHEDULED
Status: DISCONTINUED | OUTPATIENT
Start: 2021-04-09 | End: 2021-04-14 | Stop reason: HOSPADM

## 2021-04-09 RX ORDER — ALBUTEROL SULFATE 2.5 MG/3ML
2.5 SOLUTION RESPIRATORY (INHALATION) EVERY 6 HOURS PRN
Status: DISCONTINUED | OUTPATIENT
Start: 2021-04-09 | End: 2021-04-11

## 2021-04-09 RX ORDER — L-METHYLFOLATE-ALGAE-VIT B12-B6 CAP 3-90.314-2-35 MG 3-90.314-2-35 MG
1 CAP ORAL 2 TIMES DAILY
Status: DISCONTINUED | OUTPATIENT
Start: 2021-04-09 | End: 2021-04-09 | Stop reason: CLARIF

## 2021-04-09 RX ORDER — POLYETHYLENE GLYCOL 3350 17 G/17G
17 POWDER, FOR SOLUTION ORAL DAILY PRN
Status: DISCONTINUED | OUTPATIENT
Start: 2021-04-09 | End: 2021-04-14 | Stop reason: HOSPADM

## 2021-04-09 RX ORDER — SODIUM CHLORIDE 0.9 % (FLUSH) 0.9 %
5-40 SYRINGE (ML) INJECTION PRN
Status: DISCONTINUED | OUTPATIENT
Start: 2021-04-09 | End: 2021-04-14 | Stop reason: HOSPADM

## 2021-04-09 RX ORDER — BUDESONIDE AND FORMOTEROL FUMARATE DIHYDRATE 80; 4.5 UG/1; UG/1
2 AEROSOL RESPIRATORY (INHALATION) 2 TIMES DAILY
Status: DISCONTINUED | OUTPATIENT
Start: 2021-04-09 | End: 2021-04-14 | Stop reason: HOSPADM

## 2021-04-09 RX ORDER — PROMETHAZINE HYDROCHLORIDE 25 MG/1
12.5 TABLET ORAL EVERY 6 HOURS PRN
Status: DISCONTINUED | OUTPATIENT
Start: 2021-04-09 | End: 2021-04-14 | Stop reason: HOSPADM

## 2021-04-09 RX ORDER — METHYLPREDNISOLONE SODIUM SUCCINATE 125 MG/2ML
125 INJECTION, POWDER, LYOPHILIZED, FOR SOLUTION INTRAMUSCULAR; INTRAVENOUS ONCE
Status: COMPLETED | OUTPATIENT
Start: 2021-04-09 | End: 2021-04-09

## 2021-04-09 RX ORDER — ALBUTEROL SULFATE 2.5 MG/3ML
2.5 SOLUTION RESPIRATORY (INHALATION) PRN
Status: DISCONTINUED | OUTPATIENT
Start: 2021-04-09 | End: 2021-04-14 | Stop reason: HOSPADM

## 2021-04-09 RX ORDER — LATANOPROST 50 UG/ML
1 SOLUTION/ DROPS OPHTHALMIC NIGHTLY
Status: DISCONTINUED | OUTPATIENT
Start: 2021-04-09 | End: 2021-04-14 | Stop reason: HOSPADM

## 2021-04-09 RX ORDER — PREDNISONE 1 MG/1
TABLET ORAL
Qty: 38 TABLET | Refills: 0 | Status: ON HOLD
Start: 2021-04-09 | End: 2021-04-14 | Stop reason: HOSPADM

## 2021-04-09 RX ORDER — ACETAMINOPHEN 325 MG/1
650 TABLET ORAL EVERY 6 HOURS PRN
Status: DISCONTINUED | OUTPATIENT
Start: 2021-04-09 | End: 2021-04-14 | Stop reason: HOSPADM

## 2021-04-09 RX ORDER — DIPHENHYDRAMINE HYDROCHLORIDE 50 MG/ML
25 INJECTION INTRAMUSCULAR; INTRAVENOUS ONCE
Status: COMPLETED | OUTPATIENT
Start: 2021-04-09 | End: 2021-04-09

## 2021-04-09 RX ORDER — NITROGLYCERIN 0.4 MG/1
0.4 TABLET SUBLINGUAL EVERY 5 MIN PRN
Status: DISCONTINUED | OUTPATIENT
Start: 2021-04-09 | End: 2021-04-14 | Stop reason: HOSPADM

## 2021-04-09 RX ORDER — ATORVASTATIN CALCIUM 40 MG/1
40 TABLET, FILM COATED ORAL NIGHTLY
Status: DISCONTINUED | OUTPATIENT
Start: 2021-04-09 | End: 2021-04-14 | Stop reason: HOSPADM

## 2021-04-09 RX ORDER — ONDANSETRON 2 MG/ML
4 INJECTION INTRAMUSCULAR; INTRAVENOUS EVERY 6 HOURS PRN
Status: DISCONTINUED | OUTPATIENT
Start: 2021-04-09 | End: 2021-04-14 | Stop reason: HOSPADM

## 2021-04-09 RX ADMIN — NITROGLYCERIN 0.4 MG: 0.4 TABLET SUBLINGUAL at 17:45

## 2021-04-09 RX ADMIN — SODIUM CHLORIDE, PRESERVATIVE FREE 10 ML: 5 INJECTION INTRAVENOUS at 20:25

## 2021-04-09 RX ADMIN — METHYLPREDNISOLONE SODIUM SUCCINATE 125 MG: 125 INJECTION, POWDER, FOR SOLUTION INTRAMUSCULAR; INTRAVENOUS at 11:05

## 2021-04-09 RX ADMIN — LATANOPROST 1 DROP: 50 SOLUTION OPHTHALMIC at 21:49

## 2021-04-09 RX ADMIN — DIPHENHYDRAMINE HYDROCHLORIDE 25 MG: 50 INJECTION, SOLUTION INTRAMUSCULAR; INTRAVENOUS at 11:04

## 2021-04-09 RX ADMIN — IOPAMIDOL 75 ML: 755 INJECTION, SOLUTION INTRAVENOUS at 11:16

## 2021-04-09 NOTE — TELEPHONE ENCOUNTER
Patient called stating that for the past few nights he has been having issues breathing and he thinks it's due to the trees. He has been coughing up big amounts of mucus. He also stated he read you shouldn't take a beta blocker along with his nebulizer which I couldn't say yes or no too. He is on metoprolol 25 mg. Please advise.

## 2021-04-09 NOTE — ED NOTES
1301 paged hospitalist     Mansi Pantoja  04/09/21 1301  1333 hospitalist returned call      Mansi Pantoja  04/09/21 9650

## 2021-04-09 NOTE — ED NOTES
Genevieve Barrera 2nd blood culture set from patient's left forearm.       Jose Alejandro Bedoya  04/09/21 4147

## 2021-04-09 NOTE — H&P
History and Physical      Name:  Alberto Barreto /Age/Sex: 1940  (78 y.o. male)   MRN & CSN:  [id number] & [id number] Admission Date/Time: 2021 10:57 AM   Location:  [address]- PCP: Oralia Treviño MD       Hospital Day: 1    History of Present Illness:     Chief Complaint: Complaints of chocking and Loss of consciousness. Alberto Barreto is a 78 y.o.  male with history of CHF, PA. fib, COPD, hypertension presents with complaints of chocking. At around  11:00 AM, they were sitting at the table, finished muffin and strawberry, talking to grand daughter, he started holding his neck, showed felt like chocking. She tried to grab him and push hard on the chest but could be able to perform the procedure. She pushed emergency button, called EMS, who arrived at the scene, though he was not chocking as he was able to breath. Per wife not sure if he lost consciousness or not, she says it looks like he lost consciousness. Per EMS he initially able to answer the questions, alert and awake but off and on he lost his consciousness. Ten point ROS cannot be obtained from patient as he is altered  He presented with the above mentioned complaints to ER, per ER physicians evaluation he was awake, appeared to be following commands but remained non verbal. Initial EKG was with no evidence of ischemia/infarct. CT head with no acute findings. CTA head and neck with no significant occlusive major vessel disease. Tele neurology did think it could be anoxic brain injury. Objective:   No intake or output data in the 24 hours ending 21 1345   Vitals:   Vitals:    21 1130   BP: (!) 164/76   Pulse: 67   Resp: 15   Temp:    SpO2: 99%     Physical Exam:   General Appearance: He is sleepy, awakened with deep stimulus. Minimally following commands but non verbal  Cardiovascular: normal rate, regular rhythm, normal S1 and S2  Pulmonary/Chest: clear to auscultation bilaterally.  Poor inspiratory effort  Abdomen: soft, non-tender, non-distended, normal bowel sounds, no masses   Extremities: no cyanosis, clubbing or edema, pulse   Skin: warm and dry, no rash or erythema  Head: normocephalic and atraumatic  Eyes: pupils equal, round, and reactive to light  Neck: supple and non-tender without mass, no thyromegaly   Musculoskeletal: normal range of motion, no joint swelling, deformity or tenderness  Neurological: He is sleepy, awakened with deep stimulus. Non focal. He is non verbal.    Past Medical History:      Past Medical History:   Diagnosis Date    A-fib ([facility] 3/31/14    Initial consult    Acute bronchospasm 9/20/2016    Acute exacerbation of chronic obstructive pulmonary disease (COPD) ([facility] 1/16/2018    Asthma     Atrial fibrillation ([facility]     Blood glucose elevated 12/17/2013    [id number]    Cancer ([facility] dx 2003    prostrate- tx surg only/ tx for skin cancer of head now    Chest pain 3/14    COPD, mild ([address] 2/49/8161    Diastolic CHF (HCC)     Edema     Family history of diabetes mellitus     Brother    Fatigue 3/14    Glaucoma     \"watching this now\" Dr Emre Quintanilla H/O cardiovascular stress test 4/14/14 5/14EF>55% Atrial septal occluder device noted. 4/14No evidence of hemodynamically significant coronary artery disease    H/O cardiovascular stress test 1/14/2016    lexiscan-normal,EF70%    H/O echocardiogram 02/12/15    EF 60% Mildly hypertrophied LV and LV systolic function. Mild MR & TR. Atrial septal occluder device is seen with is fuctioning normally.  H/O echocardiogram 1/14/2016    EF 60% aaortic root mildly dilated with dimension of 4.3    H/O transesophageal echocardiography (HARLAN) for monitoring 5/22/2014    normal septal occluder     Hiatal hernia     planning to see Dr Gill Or about this    History of 24 hour EKG monitoring 5/10/14    Sinus rhythm.     History of cardiovascular stress test 12/16/2013 12/2013-([facility])-Probably normal Regadenoson/Exercise with Tc-99 sestamibi. No ischemia noted. Small fixed defect in anteroseptuma and a second small fixed defect in inferolateral wall, most consistent with attenuation artifact. Normal LV size. Global LVSF normal and normal wall motion;    History of echocardiogram 11/11/2013 11/2013-([facility])-Interatrial septal occluder device is visualized. Normal biventricular systolic function; LVEF - 60-65%. Mild degenerative valvular changes; Mild AI, Mild MR, trace TR, trace PI. No pericardial effusion;    History of heart surgery 11/13    PFO closure by Amplatzer 25mm Cribriform device to intra-atrial septum--> [address])    History of kidney stones     tx with surgery for this in the past    History of nuclear stress test 2/16/15    EF 70%. WNL    Hx of Doppler ultrasound 5/7/2014    Carotid- right and left normal    Hx of Doppler ultrasound 6/30/14    Right groin US: Negative for pseudoaneurysm findings.  Hx of echocardiogram 3/31/2014    mild mitral and tricusid regurg, mildly hypertrophied left ventricle    Hx of motion sickness     and claustrophobia    HX OTHER MEDICAL 2/18/2014-2/24/2014 2/2014-([facility])- 7 day Event Monitor- Five events were available for review. these were all symptom prompted events. Predominant rhythm shows normal sinus rhythm. No significant arrhythmias recorded. -Dr Kodi Newby     \" I have to move at slow pace\"get sob with exertion    Hyperlipidemia     Hypertension     follows with Dr Kaitlin Curtis Hypoglycemia     \"dx with this years ago\"    Lung nodule seen on imaging study 1/16/2018    Mild persistent asthma without complication 6/89/9422    Paroxysmal atrial fibrillation (HCC)     symptomatic    Prolonged emergence from general anesthesia     PVD (peripheral vascular disease) ([facility]     SOB (shortness of breath) 3/14    Stable angina (HCC)     Syncope and collapse     Thoracic aortic aneurysm (HCC)     4.5cm    Unspecified cerebral artery occlusion with cerebral infarction     multiple CVA's; weakness in right arm; has recurrent TIA's     PSHX:  has a past surgical history that includes Prostatectomy (); Cardiac surgery (2013); Hemorrhoid surgery (); Nasal septum surgery (); Rotator cuff repair (Right, ); other surgical history (2013); other surgical history (2010); Colonoscopy (); Tonsillectomy (); Cholecystectomy (); Inguinal hernia repair (Left, ); Cystoscopy (0833'I); eye surgery (); Vasectomy (); Endoscopy, colon, diagnostic (7/24/15); Endoscopy, colon, diagnostic (2017); Cardiac surgery (2019); joint replacement (Left); skin biopsy; and Upper gastrointestinal endoscopy (N/A, 2020). Allergies: Allergies   Allergen Reactions    Aminophylline     Contrast [Iodides]      Patient states had IV contrast with negative results     Other      slobid    Pentazocine Lactate [Pentazocine]     Prednisone      Shakes, nausea    Theophyllines        FAM HX: family history includes Coronary Art Dis in his brother and father; Diabetes in his brother, sister, sister, and sister; Heart Disease in his brother, brother, father, and sister. Soc HX:   Social History     Socioeconomic History    Marital status:      Spouse name: None    Number of children: None    Years of education: None    Highest education level: None   Occupational History    None   Social Needs    Financial resource strain: None    Food insecurity     Worry: None     Inability: None    Transportation needs     Medical: None     Non-medical: None   Tobacco Use    Smoking status: Former Smoker     Packs/day: 1.00     Years: 5.00     Pack years: 5.00     Quit date: 1970     Years since quittin.3    Smokeless tobacco: Never Used    Tobacco comment: smoked less than 1/2 pack- use to smoke a pipe- quit    Substance and Sexual Activity    Alcohol use:  Yes Comment: rare alcohol;     CAFFEINE: coffee/ average drinking alcohol 6 itimes per year    Drug use: No    Sexual activity: Yes     Partners: Female   Lifestyle    Physical activity     Days per week: None     Minutes per session: None    Stress: None   Relationships    Social connections     Talks on phone: None     Gets together: None     Attends Oriental orthodox service: None     Active member of club or organization: None     Attends meetings of clubs or organizations: None     Relationship status: None    Intimate partner violence     Fear of current or ex partner: None     Emotionally abused: None     Physically abused: None     Forced sexual activity: None   Other Topics Concern    None   Social History Narrative    None       Prior to Admission medications    Medication Sig Start Date End Date Taking?  Authorizing Provider   predniSONE (DELTASONE) 5 MG tablet Take 6 PO now, 5 PO qam for 2 days, 3 PO qam for 3 days, 2 PO qam for 4 days, 1 PO qam for 5 days, then STOP 4/9/21   Flor Faust MD   fluticasone-vilanterol (BREO ELLIPTA) 100-25 MCG/INH AEPB inhaler Inhale 1 puff into the lungs daily After inhalation then gargle 3/11/21 6/9/21  Flor Faust MD   albuterol sulfate (PROAIR RESPICLICK) 856 (90 Base) MCG/ACT aerosol powder inhalation Inhale 2 puffs into the lungs as needed for Wheezing or Shortness of Breath 2/18/21   Flor Faust MD   fluticasone-vilanterol (BREO ELLIPTA) 100-25 MCG/INH AEPB inhaler Inhale 1 puff into the lungs daily After inhalation then gargle 2/18/21   Flor Faust MD   famotidine (PEPCID) 40 MG tablet Take 40 mg by mouth daily    Historical Provider, MD   [id number] Rhoda Font) 3-92.359-6-75 MG CAPS capsule Take 1 capsule by mouth 2 times daily    Historical Provider, MD   Magnesium 400 MG CAPS Take by mouth    Historical Provider, MD   guaiFENesin (BREONESIN PO) Take by mouth    Historical Provider, MD   nitroGLYCERIN (NITROSTAT) 0.4 MG SL tablet Place 0.4 mg Date     04/09/2021    K 3.8 04/09/2021     04/09/2021    CO2 25 04/09/2021    BUN 19 04/09/2021    CREATININE 0.9 04/09/2021    GLUCOSE 115 04/09/2021    CALCIUM 8.9 04/09/2021     Hepatic Function Panel:    Lab Results   Component Value Date    ALKPHOS 74 04/09/2021    AST 39 04/09/2021    ALT 28 04/09/2021    PROT 6.6 04/09/2021    PROT 6.2 12/11/2012    LABALBU 3.8 04/09/2021    BILITOT 1.3 04/09/2021     Magnesium:    Lab Results   Component Value Date    MG 1.9 02/25/2019     Cardiac Enzymes:   Lab Results   Component Value Date    CKTOTAL 73 03/13/2019    CKTOTAL 110 06/17/2018    CKTOTAL 87 03/30/2011    TROPONINI 0.006 04/13/2014    TROPONINI <0.006 04/13/2014    TROPONINI 0.011 04/13/2014     LDH:  No results found for: LDH  PT/INR:    Lab Results   Component Value Date    PROTIME 14.2 04/09/2021    INR 1.17 04/09/2021     U/A:   Lab Results   Component Value Date    LEUKOCYTESUR NEGATIVE 04/09/2021    WBCUA <1 04/09/2021    RBCUA 1 04/09/2021    BACTERIA NEGATIVE 04/09/2021    SPECGRAV 1.021 04/09/2021    BLOODU NEGATIVE 04/09/2021     ABG:  No results found for: PHART, DPU6YVA, PO2ART, E7LJYIXU, VXT4IRQ, BEART  TSH:    Lab Results   Component Value Date    TSH 1.01 02/13/2014     Cardiac Enzymes:   Lab Results   Component Value Date    CKTOTAL 73 03/13/2019    CKTOTAL 110 06/17/2018    CKTOTAL 87 03/30/2011    TROPONINI 0.006 04/13/2014    TROPONINI <0.006 04/13/2014    TROPONINI 0.011 04/13/2014       Assessment and Plan:   Reuel Cogan is a 78 y.o.  male  who presents with AMS    Acute encephalopathy  Initial work-up including CT head, showed old left parietal lobe small infarct, appears to be old infarct. No evidence of acute infarct  CT head and neck with no significant major vessel obstruction. 40% stenosis of the origin of the right vertebral artery  Noted ER doctor spoke with telemetry neurologist, who recommended no TPA and no need for transfer.   Thinking it could be anoxic brain

## 2021-04-10 LAB
CHOLESTEROL: 132 MG/DL
EKG ATRIAL RATE: 87 BPM
EKG DIAGNOSIS: NORMAL
EKG P-R INTERVAL: 190 MS
EKG Q-T INTERVAL: 388 MS
EKG QRS DURATION: 98 MS
EKG QTC CALCULATION (BAZETT): 466 MS
EKG R AXIS: 220 DEGREES
EKG T AXIS: 188 DEGREES
EKG VENTRICULAR RATE: 87 BPM
ESTIMATED AVERAGE GLUCOSE: 114 MG/DL
GLUCOSE BLD-MCNC: 119 MG/DL (ref 70–99)
HBA1C MFR BLD: 5.6 % (ref 4.2–6.3)
HCT VFR BLD CALC: 48.6 % (ref 42–52)
HDLC SERPL-MCNC: 64 MG/DL
HEMOGLOBIN: 16 GM/DL (ref 13.5–18)
LDL CHOLESTEROL DIRECT: 66 MG/DL
MCH RBC QN AUTO: 30 PG (ref 27–31)
MCHC RBC AUTO-ENTMCNC: 32.9 % (ref 32–36)
MCV RBC AUTO: 91.2 FL (ref 78–100)
PDW BLD-RTO: 12.6 % (ref 11.7–14.9)
PLATELET # BLD: 189 K/CU MM (ref 140–440)
PMV BLD AUTO: 9.6 FL (ref 7.5–11.1)
RBC # BLD: 5.33 M/CU MM (ref 4.6–6.2)
TRIGL SERPL-MCNC: 40 MG/DL
TROPONIN T: <0.01 NG/ML
WBC # BLD: 8.4 K/CU MM (ref 4–10.5)

## 2021-04-10 PROCEDURE — 36415 COLL VENOUS BLD VENIPUNCTURE: CPT

## 2021-04-10 PROCEDURE — 82962 GLUCOSE BLOOD TEST: CPT

## 2021-04-10 PROCEDURE — 94761 N-INVAS EAR/PLS OXIMETRY MLT: CPT

## 2021-04-10 PROCEDURE — 83721 ASSAY OF BLOOD LIPOPROTEIN: CPT

## 2021-04-10 PROCEDURE — 97165 OT EVAL LOW COMPLEX 30 MIN: CPT

## 2021-04-10 PROCEDURE — 99205 OFFICE O/P NEW HI 60 MIN: CPT | Performed by: NURSE PRACTITIONER

## 2021-04-10 PROCEDURE — 93010 ELECTROCARDIOGRAM REPORT: CPT | Performed by: INTERNAL MEDICINE

## 2021-04-10 PROCEDURE — G0378 HOSPITAL OBSERVATION PER HR: HCPCS

## 2021-04-10 PROCEDURE — 6370000000 HC RX 637 (ALT 250 FOR IP): Performed by: INTERNAL MEDICINE

## 2021-04-10 PROCEDURE — 85027 COMPLETE CBC AUTOMATED: CPT

## 2021-04-10 PROCEDURE — 97535 SELF CARE MNGMENT TRAINING: CPT

## 2021-04-10 PROCEDURE — 6370000000 HC RX 637 (ALT 250 FOR IP): Performed by: NURSE PRACTITIONER

## 2021-04-10 PROCEDURE — 83036 HEMOGLOBIN GLYCOSYLATED A1C: CPT

## 2021-04-10 PROCEDURE — 97530 THERAPEUTIC ACTIVITIES: CPT

## 2021-04-10 PROCEDURE — 84484 ASSAY OF TROPONIN QUANT: CPT

## 2021-04-10 PROCEDURE — 92610 EVALUATE SWALLOWING FUNCTION: CPT

## 2021-04-10 PROCEDURE — 2580000003 HC RX 258: Performed by: INTERNAL MEDICINE

## 2021-04-10 PROCEDURE — 6360000002 HC RX W HCPCS: Performed by: INTERNAL MEDICINE

## 2021-04-10 PROCEDURE — 99203 OFFICE O/P NEW LOW 30 MIN: CPT | Performed by: INTERNAL MEDICINE

## 2021-04-10 PROCEDURE — 80061 LIPID PANEL: CPT

## 2021-04-10 PROCEDURE — 94640 AIRWAY INHALATION TREATMENT: CPT

## 2021-04-10 RX ORDER — L-METHYLFOLATE-ALGAE-VIT B12-B6 CAP 3-90.314-2-35 MG 3-90.314-2-35 MG
1 CAP ORAL 2 TIMES DAILY
Status: DISCONTINUED | OUTPATIENT
Start: 2021-04-10 | End: 2021-04-10 | Stop reason: CLARIF

## 2021-04-10 RX ORDER — LEVETIRACETAM 500 MG/1
500 TABLET ORAL 2 TIMES DAILY
Status: DISCONTINUED | OUTPATIENT
Start: 2021-04-10 | End: 2021-04-10

## 2021-04-10 RX ORDER — ALPRAZOLAM 0.5 MG/1
0.5 TABLET ORAL NIGHTLY PRN
Status: DISCONTINUED | OUTPATIENT
Start: 2021-04-10 | End: 2021-04-14 | Stop reason: HOSPADM

## 2021-04-10 RX ORDER — B12/LEVOMEFOLATE CALCIUM/B-6 2-1.13-25
1 TABLET ORAL 2 TIMES DAILY
Status: DISCONTINUED | OUTPATIENT
Start: 2021-04-10 | End: 2021-04-10 | Stop reason: ALTCHOICE

## 2021-04-10 RX ORDER — DOCUSATE SODIUM 100 MG/1
100 CAPSULE, LIQUID FILLED ORAL DAILY
Status: DISCONTINUED | OUTPATIENT
Start: 2021-04-10 | End: 2021-04-14 | Stop reason: HOSPADM

## 2021-04-10 RX ORDER — L-METHYLFOLATE-ALGAE-VIT B12-B6 CAP 3-90.314-2-35 MG 3-90.314-2-35 MG
1 CAP ORAL 2 TIMES DAILY
Status: DISCONTINUED | OUTPATIENT
Start: 2021-04-10 | End: 2021-04-14 | Stop reason: HOSPADM

## 2021-04-10 RX ADMIN — PANTOPRAZOLE SODIUM 40 MG: 40 TABLET, DELAYED RELEASE ORAL at 11:56

## 2021-04-10 RX ADMIN — METOPROLOL TARTRATE 12.5 MG: 25 TABLET, FILM COATED ORAL at 11:56

## 2021-04-10 RX ADMIN — BUDESONIDE AND FORMOTEROL FUMARATE DIHYDRATE 2 PUFF: 80; 4.5 AEROSOL RESPIRATORY (INHALATION) at 08:10

## 2021-04-10 RX ADMIN — BUDESONIDE AND FORMOTEROL FUMARATE DIHYDRATE 2 PUFF: 80; 4.5 AEROSOL RESPIRATORY (INHALATION) at 19:56

## 2021-04-10 RX ADMIN — ATORVASTATIN CALCIUM 40 MG: 40 TABLET, FILM COATED ORAL at 22:27

## 2021-04-10 RX ADMIN — DOCUSATE SODIUM 100 MG: 100 CAPSULE ORAL at 23:36

## 2021-04-10 RX ADMIN — APIXABAN 5 MG: 5 TABLET, FILM COATED ORAL at 23:36

## 2021-04-10 RX ADMIN — L-METHYLFOLATE-ALGAE-VIT B12-B6 CAP 3-90.314-2-35 MG 1 CAPSULE: 3-90.314-2-35 CAP at 15:05

## 2021-04-10 RX ADMIN — ALPRAZOLAM 0.5 MG: 0.5 TABLET ORAL at 23:36

## 2021-04-10 RX ADMIN — SODIUM CHLORIDE, PRESERVATIVE FREE 10 ML: 5 INJECTION INTRAVENOUS at 22:28

## 2021-04-10 RX ADMIN — APIXABAN 5 MG: 5 TABLET, FILM COATED ORAL at 11:55

## 2021-04-10 RX ADMIN — SODIUM CHLORIDE, PRESERVATIVE FREE 10 ML: 5 INJECTION INTRAVENOUS at 09:00

## 2021-04-10 RX ADMIN — ALBUTEROL SULFATE 2.5 MG: 2.5 SOLUTION RESPIRATORY (INHALATION) at 19:02

## 2021-04-10 RX ADMIN — MONTELUKAST 10 MG: 10 TABLET, FILM COATED ORAL at 23:36

## 2021-04-10 RX ADMIN — METOPROLOL TARTRATE 12.5 MG: 25 TABLET, FILM COATED ORAL at 22:28

## 2021-04-10 RX ADMIN — ASPIRIN 81 MG: 81 TABLET, COATED ORAL at 11:55

## 2021-04-10 ASSESSMENT — PAIN SCALES - GENERAL: PAINLEVEL_OUTOF10: 0

## 2021-04-10 NOTE — CONSULTS
Chart reviewed  Full note to follow                      Name:  Abhilash Peralta /Age/Sex: 1940  (78 y.o. male)   MRN & CSN:  [id number] & [id number] Admission Date/Time: 2021 10:57 AM   Location:  3026/3026-A PCP: Danielle Reece, [facility] Day: 2          Referring physician:  Allyn Reynoso MD         Reason for consultation:  syncope        Thanks for referral.    Information source: patient    CC;  cp      HPI:   Thank you for involving me in taking  care of Abhilash Peralta who  is a 78 y. o.year  Old male  Presents with  H/o CAD, PCI.  , CVA, PAF, ? Sz history mw admitted with ? Another sizure, ? Aspirated , was post ictal ? , known to neuro, says had some CP as well. A t present stable, has no CP.                     Past medical history:    has a past medical history of A-fib ([address] Acute bronchospasm, Acute exacerbation of chronic obstructive pulmonary disease (COPD) ([address] Asthma, Atrial fibrillation ([address] Blood glucose elevated, Cancer ([address] Chest pain, COPD, mild ([address] Diastolic CHF ([address] Edema, Family history of diabetes mellitus, Fatigue, Glaucoma, H/O cardiovascular stress test, H/O cardiovascular stress test, H/O echocardiogram, H/O echocardiogram, H/O transesophageal echocardiography (HARLAN) for monitoring, Hiatal hernia, History of 24 hour EKG monitoring, History of cardiovascular stress test, History of echocardiogram, History of heart surgery, History of kidney stones, History of nuclear stress test, Hx of Doppler ultrasound, Hx of Doppler ultrasound, Hx of echocardiogram, Hx of motion sickness, HX OTHER MEDICAL, HX OTHER MEDICAL, Hyperlipidemia, Hypertension, Hypoglycemia, Lung nodule seen on imaging study, Mild persistent asthma without complication, Paroxysmal atrial fibrillation ([address] Prolonged emergence from general anesthesia, PVD (peripheral vascular disease) ([address] SOB (shortness of breath), Stable angina ([address] Syncope and collapse, Thoracic aortic aneurysm ([address] and Unspecified cerebral artery occlusion with cerebral infarction. Past surgical history:   has a past surgical history that includes Prostatectomy (2004); Cardiac surgery (11/11/2013); Hemorrhoid surgery (1977); Nasal septum surgery (1986); Rotator cuff repair (Right, 2008); other surgical history (11/11/2013); other surgical history (2/2010); Colonoscopy (2006); Tonsillectomy (1950); Cholecystectomy (2001); Inguinal hernia repair (Left, 1995); Cystoscopy (4279'Z); eye surgery (2006); Vasectomy (1986); Endoscopy, colon, diagnostic (7/24/15); Endoscopy, colon, diagnostic (04/26/2017); Cardiac surgery (03/2019); joint replacement (Left); skin biopsy; and Upper gastrointestinal endoscopy (N/A, 12/23/2020). Social History:   reports that he quit smoking about 51 years ago. He has a 5.00 pack-year smoking history. He has never used smokeless tobacco. He reports current alcohol use. He reports that he does not use drugs. Family history:  family history includes Coronary Art Dis in his brother and father; Diabetes in his brother, sister, sister, and sister; Heart Disease in his brother, brother, father, and sister.     Allergies   Allergen Reactions    Aminophylline     Contrast [Iodides]      Patient states had IV contrast with negative results     Other      slobid    Pentazocine Lactate [Pentazocine]     Prednisone      Shakes, nausea    Theophyllines        montelukast (SINGULAIR) tablet 10 mg, Nightly  pantoprazole (PROTONIX) tablet 40 mg, Daily  atorvastatin (LIPITOR) tablet 40 mg, Nightly  aspirin EC tablet 81 mg, Daily  latanoprost (XALATAN) 0.005 % ophthalmic solution 1 drop, Nightly  apixaban (ELIQUIS) tablet 5 mg, BID  albuterol (PROVENTIL) nebulizer solution 2.5 mg, Q6H PRN  nitroGLYCERIN (NITROSTAT) SL tablet 0.4 mg, Q5 Min PRN  metoprolol tartrate (LOPRESSOR) tablet 12.5 mg, BID  albuterol (PROVENTIL) nebulizer solution 2.5 mg, PRN  budesonide-formoterol (SYMBICORT) 80-4.5 MCG/ACT inhaler 2 puff, BID  sodium chloride flush 0.9 % injection 5-40 mL, 2 times per day  sodium chloride flush 0.9 % injection 5-40 mL, PRN  0.9 % sodium chloride infusion, PRN  polyethylene glycol (GLYCOLAX) packet 17 g, Daily PRN  acetaminophen (TYLENOL) tablet 650 mg, Q6H PRN    Or  acetaminophen (TYLENOL) suppository 650 mg, Q6H PRN  promethazine (PHENERGAN) tablet 12.5 mg, Q6H PRN    Or  ondansetron (ZOFRAN) injection 4 mg, Q6H PRN  sodium chloride flush 0.9 % injection 5-40 mL, 2 times per day  sodium chloride flush 0.9 % injection 5-40 mL, PRN  0.9 % sodium chloride infusion, PRN      Current Facility-Administered Medications   Medication Dose Route Frequency Provider Last Rate Last Admin    montelukast (SINGULAIR) tablet 10 mg  10 mg Oral Nightly Iris Ramires MD        pantoprazole (PROTONIX) tablet 40 mg  40 mg Oral Daily Iris Ramires MD        atorvastatin (LIPITOR) tablet 40 mg  40 mg Oral Nightly Iris Ramires MD        aspirin EC tablet 81 mg  81 mg Oral Daily Iris Ramires MD        latanoprost (XALATAN) 0.005 % ophthalmic solution 1 drop  1 drop Both Eyes Nightly Iris Ramires MD   1 drop at 04/09/21 2149    apixaban (ELIQUIS) tablet 5 mg  5 mg Oral BID Iris Ramires MD        albuterol (PROVENTIL) nebulizer solution 2.5 mg  2.5 mg Nebulization Q6H PRN Iris Ramires MD        nitroGLYCERIN (NITROSTAT) SL tablet 0.4 mg  0.4 mg Sublingual Q5 Min PRN Iris Ramires MD   0.4 mg at 04/09/21 1745    metoprolol tartrate (LOPRESSOR) tablet 12.5 mg  12.5 mg Oral BID Iris Ramires MD        albuterol (PROVENTIL) nebulizer solution 2.5 mg  2.5 mg Nebulization PRN Iris Ramires MD        budesonide-formoterol (SYMBICORT) 80-4.5 MCG/ACT inhaler 2 puff  2 puff Inhalation BID Iris Ramires MD        sodium chloride flush 0.9 % injection 5-40 mL  5-40 mL Intravenous 2 times per day Cali Grey Micah Cox MD        sodium chloride flush 0.9 % injection 5-40 mL  5-40 mL Intravenous PRN Megha Katz MD        0.9 % sodium chloride infusion  25 mL Intravenous PRN Megha Katz MD        polyethylene glycol (GLYCOLAX) packet 17 g  17 g Oral Daily PRN Megha Katz MD        acetaminophen (TYLENOL) tablet 650 mg  650 mg Oral Q6H PRN Megha Katz MD        Or    acetaminophen (TYLENOL) suppository 650 mg  650 mg Rectal Q6H PRN Megha Katz MD        promethazine (PHENERGAN) tablet 12.5 mg  12.5 mg Oral Q6H PRN Megha Katz MD        Or    ondansetron [facility]) injection 4 mg  4 mg Intravenous Q6H PRN Megha Katz MD        sodium chloride flush 0.9 % injection 5-40 mL  5-40 mL Intravenous 2 times per day Megha Katz MD   10 mL at 04/09/21 2025    sodium chloride flush 0.9 % injection 5-40 mL  5-40 mL Intravenous PRN Megha Katz MD        0.9 % sodium chloride infusion  25 mL Intravenous PRN Megha Katz MD         Review of Systems:  All 14 systems reviewed, all negative     Physical Examination:    /75   Pulse 93   Temp 98.3 °F (36.8 °C) (Oral)   Resp 16   Ht 5' 4\" (1.626 m)   Wt 178 lb 8 oz (81 kg)   SpO2 96%   BMI 30.64 kg/m²      Wt Readings from Last 3 Encounters:   04/09/21 178 lb 8 oz (81 kg)   02/18/21 168 lb (76.2 kg)   12/23/20 168 lb (76.2 kg)     Body mass index is 30.64 kg/m².       General Appearance:  fair  Head: normocephalic     Eyes: normal, noninjected conjunctiva    ENT: normal mucosa, noninjected throat, normal     NECK: No JVP  No thyromegaly        Cardiovascular: No thrills palpated   Auscultation: Normal S1 and S2,  no murmur   carotid bruit no   Abdominal Aorta no bruit    Respiratory:    Breath sounds Clear = 0    Extremities:  none Edema clubbing ,   no cyanosis    SKIN: Warm and well perfused, no pallor or cyanosis    Vascular exam:  Pedal Pulses: palp  bilaterally        Abdomen:  No masses or tenderness. No organomegaly noted. Neurological:  Oriented to time, place, and person   No focal neurological deficit noted. Psychiatric:normal mood, no anxiety    Lab Review   Recent Labs     04/10/21  0436   WBC 8.4   HGB 16.0   HCT 48.6         Recent Labs     04/09/21  1105   *   K 3.8      CO2 25   BUN 19   CREATININE 0.9     Recent Labs     04/09/21  1105   AST 39*   ALT 28   BILITOT 1.3*   ALKPHOS 74     No results for input(s): TROPONINI in the last 72 hours. Lab Results   Component Value Date    BNP 49 12/10/2013    BNP 30 09/25/2013     Lab Results   Component Value Date    INR 1.17 04/09/2021    PROTIME 14.2 04/09/2021           Assessment/Recommendations:     - CAD; had LAD PCI at [facility], check serial trops  - Risk factor modification  - Current presentation ?  Sec to seizure  - Neuro following , known to Dr Nicole Fragoso per care Chrystal Otoole MD, 4/10/2021 7:03 AM

## 2021-04-10 NOTE — PROGRESS NOTES
Speech Language Pathology  Facility/Department: [facility] EVALUATION    NAME: Tyrone Echols  : 1940  MRN: [id number]    ADMISSION DATE: 2021  ADMITTING DIAGNOSIS: has Pneumonia; Hypertension; Hyperlipemia; Chest pain on exertion; SOB (shortness of breath); General weakness; PFO (patent foramen ovale); History of 24 hour EKG monitoring; Diastolic CHF ([address] Stable angina ([address] Edema; Groin discomfort; Hx of Doppler ultrasound; Hx of Doppler ultrasound; Ileus ([address] Mild persistent asthma without complication; Acute bronchospasm; COPD, mild ([address] Asthma exacerbation; Hiatal hernia; Pancreatitis, unspecified pancreatitis type; Acute exacerbation of chronic obstructive pulmonary disease (COPD) ([address] Lung nodule seen on imaging study; TIA (transient ischemic attack); Seizure ([address] Altered mental status; Thoracic aortic aneurysm ([address] and Encephalopathy acute on their problem list.  ONSET DATE: 21    Recent Chest Xray/CT of Chest:21-   Mild pulmonary vascular congestion.       Stable mild cardiomegaly.       Mild left pleural effusion. Date of Eval: 4/10/2021  Evaluating Therapist: Wojciech Perea MS, CCC-SLP  Current Diet level:  Current Diet : Dysphagia Soft and Bite-Sized (Dysphagia III)  Current Liquid Diet : Thin    Primary Complaint  Patient Complaint: choking incident, LOC, food stuck midline low sternum    IMPRESSIONS: Tyrone Echols participated in a BSE this date following admission to [facility] 21 d/t choking incident with LOC. Pt has hx of EGD. He indicates solid food gets stuck midline at the lower level of the sternum. He was alert and oriented, followed all directions appropriately. OME is WNL, clear vocal quality, no cough elicited. Pt is upright in his chair. Given ice chips, thin via cup/straw, puree, and hard solid. During conversation following solid trials it was noted the pt was swallowing several times.  When probed, he states he feels the food is \"stuck\" at same indicated level prior to eval. No other overt s/s of aspiration. With discussion and concern for esophageal dysphagia + pt comfort, recommend minced and moist diet/thin liquids, medications PO. SLP to follow loosely to monitor diet. Recommend GI consult for further evaluation. Pain:  Pain Assessment  Pain Assessment: 0-10  Patient's Stated Pain Goal: No pain    Reason for Referral  Liz Macario was referred for a bedside swallow evaluation to assess the efficiency of his swallow function, identify signs and symptoms of aspiration and make recommendations regarding safe dietary consistencies, effective compensatory strategies, and safe eating environment. Impression  Dysphagia Diagnosis: Swallow function appears grossly intact  Dysphagia Impression : WNL oral pharyngeal swallow stage, cannot r/o esophegeal dysphagia  Dysphagia Outcome Severity Scale: Level 4: Mild moderate dysphagia- Intermittent supervision/cueing. One - two diet consistencies restricted     Treatment Plan  Requires SLP Intervention: Yes  Duration/Frequency of Treatment: 1x+ f/u  D/C Recommendations: Home independently  Referral To: GI    Recommended Diet and Intervention  Diet Solids Recommendation: Dysphagia Minced and Moist (Dysphagia II)  Liquid Consistency Recommendation: Thin  Recommended Form of Meds: PO  Recommendations: Dysphagia treatment  Therapeutic Interventions: Diet tolerance monitoring;Patient/Family education    Compensatory Swallowing Strategies  Compensatory Swallowing Strategies:  Alternate solids and liquids;Eat/Feed slowly;Upright as possible for all oral intake;Remain upright for 30-45 minutes after meals    Treatment/Goals  Short-term Goals  Timeframe for Short-term Goals: LOS  Goal 1: pt will tolerate minced and moist diet/ thin liquids with 0 overt s/s of aspiration 100%  Goal 2: pt will tolerate upgraded diet trials with 0 overt s/s of aspiration 100%  Long-term Goals  Timeframe

## 2021-04-10 NOTE — PROGRESS NOTES
Physical Therapy  Triage d/c, mobilizing well with OT, no acute therapy need, MRI negative for acute infarct.

## 2021-04-10 NOTE — PROGRESS NOTES
Occupational Therapy  [facility] OCCUPATIONAL THERAPY EVALUATION    History  [place]:  The primary encounter diagnosis was Altered mental status, unspecified altered mental status type. A diagnosis of Nonverbal was also pertinent to this visit. Patient  has a past medical history of A-fib ([address] Acute bronchospasm, Acute exacerbation of chronic obstructive pulmonary disease (COPD) ([address] Asthma, Atrial fibrillation ([address] Blood glucose elevated, Cancer ([address] Chest pain, COPD, mild ([address] Diastolic CHF ([address] Edema, Family history of diabetes mellitus, Fatigue, Glaucoma, H/O cardiovascular stress test, H/O cardiovascular stress test, H/O echocardiogram, H/O echocardiogram, H/O transesophageal echocardiography (HARLAN) for monitoring, Hiatal hernia, History of 24 hour EKG monitoring, History of cardiovascular stress test, History of echocardiogram, History of heart surgery, History of kidney stones, History of nuclear stress test, Hx of Doppler ultrasound, Hx of Doppler ultrasound, Hx of echocardiogram, Hx of motion sickness, HX OTHER MEDICAL, HX OTHER MEDICAL, Hyperlipidemia, Hypertension, Hypoglycemia, Lung nodule seen on imaging study, Mild persistent asthma without complication, Paroxysmal atrial fibrillation ([address] Prolonged emergence from general anesthesia, PVD (peripheral vascular disease) (HCC), SOB (shortness of breath), Stable angina ([address] Syncope and collapse, Thoracic aortic aneurysm ([address] and Unspecified cerebral artery occlusion with cerebral infarction. Patient  has a past surgical history that includes Prostatectomy (2004); Cardiac surgery (11/11/2013); Hemorrhoid surgery (1977); Nasal septum surgery (1986); Rotator cuff repair (Right, 2008); other surgical history (11/11/2013); other surgical history (2/2010); Colonoscopy (2006); Tonsillectomy (1950); Cholecystectomy (2001); Inguinal hernia repair (Left, 1995); Cystoscopy (0212'L); eye surgery (2006); Vasectomy (1986);  Endoscopy, colon, diagnostic (7/24/15); Endoscopy, colon, diagnostic (04/26/2017); Cardiac surgery (03/2019); joint replacement (Left); skin biopsy; and Upper gastrointestinal endoscopy (N/A, 12/23/2020). Restrictions:  Restrictions/Precautions  Restrictions/Precautions: General Precautions, Fall Risk,  ,    Communication with other providers: cleared with nsg    Subjective:  Patient states:  \"I'm feeling much better\"  Occupational profile (relevant social history and personal factors):    Social/Functional History  Lives With: Friend(s)(Lives with family friend Elder Merlos)  Type of Home: House  Home Layout: One level  Home Access: Stairs to enter without rails  Entrance Stairs - Number of Steps: 2  Bathroom Shower/Tub: Tub/Shower unit, Shower chair with back  [employer]: Handicap height  Bathroom Equipment: Grab bars in shower  Bathroom Accessibility: Walker accessible  Home Equipment: 4 wheeled walker, Rolling walker  ADL Assistance: Independent  Homemaking Assistance: Independent  Homemaking Responsibilities: Yes  Ambulation Assistance: Independent  Transfer Assistance: Independent  Active : No  Occupation: Retired    Pain:  Denies  Patient goal:  Go home and     Examination of body systems (includes body structures/functions, activity/participation limitations):  · Orientation: WFL  · Cognition:  WFL  · Observation:  Supine in bed on arrival  · Vision:  Wears glasses at all time. Glaucoma in right eye  · Hearing:  Wears hearing aids, does not have hearing aids at hospital  · ROM: WFL  · Strength: WFL, minor reduced strength on right side secondary to previous stroke  · Sensation: [facility]    AM-PAC 6 click short form for inpatient daily activity:   How much help from another person does the patient currently need. .. Unable  Dep A Lot  Max A A Lot   Mod A A Little  Min A A Little   CGA  Sup None   Mod I  Indep   1. Putting on and taking off regular lower body clothing? [] 1    [] 2   [] 2   [x] 3   [] 3   [] 4      2.  Bathing (including washing, rinsing, drying)? [] 1   [] 2   [] 2 [] 3 [x] 3 [] 4   3. Toileting, which includes using toilet, bedpan, or urinal? [] 1    [] 2   [] 2   [] 3   [x] 3   [] 4     4. Putting on and taking off regular upper body clothing? [] 1   [] 2   [] 2   [] 3   [] 3    [x] 4      5. Taking care of personal grooming such as brushing teeth? [] 1   [] 2    [] 2 [] 3    [] 3   [x] 4      6. Eating meals? [] 1   [] 2   [] 2   [] 3   [] 3   [x] 4      Raw Score:  21/24 24/24 = unimpaired  23/24 = 1-20% impaired   20/24-22/24 = 21-40% impaired  15/24-19/24 = 41-59% impaired   10/24-14/24 = 60%-79% impaired  7/24-9/24 = 80%-99% impaired  6/24 = 100% impaired    ADLs  Feeding: Mod I   Grooming: Mod I  Dressing: UB Mod I LB Min A, secondary to recent hip surgery  Bathing: UB Mod I LB SUP  Toileting: SUP    Functional Mobility  Bed mobility:  Rolling: SUP  Scooting: SBA  Supine to Sit:CGA   Sitting Balance: SBA  Transfers: Sit to stand SBA  Stand to Sit: SBA  Ambulation: in room, CGA no LOB  Activity tolerance: good      Assessment:  Assessment  Performance deficits / Impairments: Decreased high-level IADLs, Decreased balance  Assessment: Pt is an 78year old male who presents to the hospital after loc and choking episode. At this time, pt is at [facility] and is able to complete ADLs as he would in the home environment. Pt receives help from family frind that he lives with for IADLs and some portions of ADLs. At this time therapy is not reccommended. Prognosis: Good  Decision Making: Medium Complexity  REQUIRES OT FOLLOW UP: No  No Skilled OT: At baseline function  Discharge Recommendations: Home with assist PRN      Plan:   Therapy not warranted at this time. PT demonstrating baseline skills. Treatment today:    Self Care Training:   Cues were given for safety, sequence, UE/LE placement, visual cues, and balance. Activities performed today included dressing, and grooming.     Therapeutic Activity Training: Therapeutic activity training was instructed today. Cues were given for safety, sequence, UE/LE placement, awareness, and balance. Activities performed today included bed mobility training, sup-sit, sit-stand, SPT.         Time in:  842  Time out:  930  Timed treatment minutes:  30  Total treatment time: 48     Electronically signed by:  Adalid Muse OT  4/10/2021, 11:23 AM

## 2021-04-10 NOTE — CONSULTS
Neurology Service Consult Note  [address] [place] [address]  Patient Name: Jose G Bai  : 1940        Subjective:   Reason for consult: \"I choked on something yesterday\"  78 y.o. right-handed male with PMH listed below presenting to [address] [place] [address] with complaints of choking of his breakfast one day prior to exam. Patient reports that he was eating breakfast with his wife, he suddenly felt he got his biscuit stuck in his throat, it would not go up or down, it was painful, he could still talk, he wanted his wife to do the heimlich maneuver, she could not so he ended up pressing the life alert button and then he passed out. He was tired all day yesterday during admission. Neurology asked to review the case as patient was tired and he has a hx of seizures. Patient diagnosed with seizures in 2018, placed on Keppra, but he does not like AED, he was trailed on Gabapentin and Vimpat and he does not like any the drugs. He has not had any AED for over 2 years without any seizure activity. Patient is a member in our practice for PN and seizure, he is on Mentax for PN and was recently placed on Cymbalta and it caused him diarrhea thus we does not want to take it. Patient denies CP and SOB currently  He states he did not bite his tongue or urinate on himself during the episode  He was not shaking  The piece of food eventually passed. No repeat episodes and he actually has been seen my GI in the past and has had endoscopy.        Past Medical History:   Diagnosis Date    A-fib [facility]) 3/31/14    Initial consult    Acute bronchospasm 2016    Acute exacerbation of chronic obstructive pulmonary disease (COPD) ([facility] 2018    Asthma     Atrial fibrillation ([facility]     Blood glucose elevated 2013    XU=702    Cancer ([address] dx     prostrate- tx surg only/ tx for skin cancer of head now    Chest pain 3/14    COPD, mild ([address] 3/88/0490    Diastolic CHF (HCC)     Edema     Family history of diabetes mellitus     Brother  Fatigue 3/14    Glaucoma     \"watching this now\" Dr Wilbur Galvan H/O cardiovascular stress test 4/14/14 5/14EF>55% Atrial septal occluder device noted. 4/14No evidence of hemodynamically significant coronary artery disease    H/O cardiovascular stress test 1/14/2016    lexiscan-normal,EF70%    H/O echocardiogram 02/12/15    EF 60% Mildly hypertrophied LV and LV systolic function. Mild MR & TR. Atrial septal occluder device is seen with is fuctioning normally.  H/O echocardiogram 1/14/2016    EF 60% aaortic root mildly dilated with dimension of 4.3    H/O transesophageal echocardiography (HARLAN) for monitoring 5/22/2014    normal septal occluder     Hiatal hernia     planning to see Dr Arpan Martinez about this    History of 24 hour EKG monitoring 5/10/14    Sinus rhythm.  History of cardiovascular stress test 12/16/2013 12/2013-([facility])-Probably normal Regadenoson/Exercise with Tc-99 sestamibi. No ischemia noted. Small fixed defect in anteroseptuma and a second small fixed defect in inferolateral wall, most consistent with attenuation artifact. Normal LV size. Global LVSF normal and normal wall motion;    History of echocardiogram 11/11/2013 11/2013-([facility])-Interatrial septal occluder device is visualized. Normal biventricular systolic function; LVEF - 60-65%. Mild degenerative valvular changes; Mild AI, Mild MR, trace TR, trace PI. No pericardial effusion;    History of heart surgery 11/13    PFO closure by Amplatzer 25mm Cribriform device to intra-atrial septum--> [employer])    History of kidney stones     tx with surgery for this in the past    History of nuclear stress test 2/16/15    EF 70%. WNL    Hx of Doppler ultrasound 5/7/2014    Carotid- right and left normal    Hx of Doppler ultrasound 6/30/14    Right groin US: Negative for pseudoaneurysm findings.     Hx of echocardiogram 3/31/2014    mild mitral and tricusid regurg, mildly hypertrophied left ventricle    Hx of motion sickness     and claustrophobia    HX OTHER MEDICAL 2/18/2014-2/24/2014 2/2014-([facility])- 7 day Event Monitor- Five events were available for review. these were all symptom prompted events. Predominant rhythm shows normal sinus rhythm. No significant arrhythmias recorded. -Dr Callum Cruz     \" I have to move at slow pace\"get sob with exertion    Hyperlipidemia     Hypertension     follows with Dr Gracie Mckeon Hypoglycemia     \"dx with this years ago\"    Lung nodule seen on imaging study 1/16/2018    Mild persistent asthma without complication 5/99/5665    Paroxysmal atrial fibrillation (HCC)     symptomatic    Prolonged emergence from general anesthesia     PVD (peripheral vascular disease) ([address]     SOB (shortness of breath) 3/14    Stable angina (HCC)     Syncope and collapse     Thoracic aortic aneurysm ([address]     4.5cm    Unspecified cerebral artery occlusion with cerebral infarction     multiple CVA's; weakness in right arm; has recurrent TIA's    :   Past Surgical History:   Procedure Laterality Date    CARDIAC SURGERY  11/11/2013 11/2013-PFO closure by an Amplatzer 25mm Cribriform device in the intra-atrial septum- [id number] Shivani Goodell  03/2019    one stint placement    CHOLECYSTECTOMY  2001    lap choley-per old chart done [id number] Sh[address]ani  2006    CYSTOSCOPY  1990's    with stone manipulation    ENDOSCOPY, COLON, DIAGNOSTIC  7/24/15    duodenal stricture,fundic gland polyp, dilation 10-15mm, hiatal hernia    ENDOSCOPY, COLON, DIAGNOSTIC  04/26/2017    mild hiatal hernia, duodenal obstruction, possible duodenal trauma r/t dilation, clips placed, stomach polyps x2    EYE SURGERY  2006    per old chart right eye cataract ext with IOL    [address]    per old chart left ing hernia repair done 1970's    JOINT REPLACEMENT Left     hip    NASAL SEPTUM SURGERY  1986    for deviation    OTHER SURGICAL HISTORY  11/11/2013    Dr. Pat Schwarz S# [id number] Model [id number]    OTHER SURGICAL HISTORY  2/2010    EUA with drainage of rectal abscess    PROSTATECTOMY  2004    ROTATOR CUFF REPAIR Right 2008    SKIN BIOPSY      head squamous cell    TONSILLECTOMY  1950    UPPER GASTROINTESTINAL ENDOSCOPY N/A 12/23/2020    EGD DILATION BALLOON 18-20 BALLOON DILATED TO 20 performed by Gage Verdugo MD at [address] [facility]     Medications:  Scheduled Meds:   montelukast  10 mg Oral Nightly    pantoprazole  40 mg Oral Daily    atorvastatin  40 mg Oral Nightly    aspirin  81 mg Oral Daily    latanoprost  1 drop Both Eyes Nightly    apixaban  5 mg Oral BID    metoprolol tartrate  12.5 mg Oral BID    budesonide-formoterol  2 puff Inhalation BID    sodium chloride flush  5-40 mL Intravenous 2 times per day    sodium chloride flush  5-40 mL Intravenous 2 times per day     Continuous Infusions:   sodium chloride      sodium chloride       PRN Meds:.albuterol, nitroGLYCERIN, albuterol, sodium chloride flush, sodium chloride, polyethylene glycol, acetaminophen **OR** acetaminophen, promethazine **OR** ondansetron, sodium chloride flush, sodium chloride    Allergies   Allergen Reactions    Aminophylline     Contrast [Iodides]      Patient states had IV contrast with negative results     Other      slobid    Pentazocine Lactate [Pentazocine]     Prednisone      Shakes, nausea    Theophyllines      Social History     Socioeconomic History    Marital status:      Spouse name: Not on file    Number of children: Not on file    Years of education: Not on file    Highest education level: Not on file   Occupational History    Not on file   Social Needs    Financial resource strain: Not on file    Food insecurity     Worry: Not on file     Inability: Not on file    Transportation needs     Medical: Not on file     Non-medical: Not on file   Tobacco Use    Smoking status: Former Smoker     Packs/day: 1.00     Years: 5.00     Pack years: 5.00     Quit date: 1970     Years since quittin.3    Smokeless tobacco: Never Used    Tobacco comment: smoked less than 1/2 pack- use to smoke a pipe- quit    Substance and Sexual Activity    Alcohol use: Yes     Comment: rare alcohol;     CAFFEINE: coffee/ average drinking alcohol 6 itimes per year    Drug use: No    Sexual activity: Yes     Partners: Female   Lifestyle    Physical activity     Days per week: Not on file     Minutes per session: Not on file    Stress: Not on file   Relationships    Social connections     Talks on phone: Not on file     Gets together: Not on file     Attends Judaism service: Not on file     Active member of club or organization: Not on file     Attends meetings of clubs or organizations: Not on file     Relationship status: Not on file    Intimate partner violence     Fear of current or ex partner: Not on file     Emotionally abused: Not on file     Physically abused: Not on file     Forced sexual activity: Not on file   Other Topics Concern    Not on file   Social History Narrative    Not on file      Family History   Problem Relation Age of Onset    Heart Disease Father     Coronary Art Dis Father     Coronary Art Dis Brother     Heart Disease Sister     Diabetes Sister     Diabetes Sister     Diabetes Sister     Heart Disease Brother     Diabetes Brother     Heart Disease Brother        Review of Symptoms:    14-point system review completed. All of which are negative except as mentioned above. Physical Exam:       Gen: A&O x 4, NAD, cooperative  HEENT: NC/AT, EOMI, PERRL, mmm, neck supple, no meningeal signs;   Heart: regular  Lungs: no distress  Ext: no edema, no calf tenderness b/l  Psych: normal mood and affect  Skin: no rashes or lesions    NEUROLOGIC EXAM:    Mental Status: A&O to self, location, month and year, NAD, speech clear, language fluent, repetition and naming intact, follows commands appropriately    Cranial Nerve Exam:   CN II-XII: PERRL, VFF, no nystagmus, no gaze paresis, sensation V1-V3 intact b/l, muscles of facial expression symmetric; hearing intact to conversational tone, palate elevates symmetrically, shoulder elevation symmetric and tongue protrudes midline with movement side to side. Motor Exam:       Strength 5/5 UE's/LE's b/l  Tone and bulk normal   No pronator drift    Deep Tendon Reflexes: 1/4 biceps, triceps, brachioradialis, patellar, and achilles b/l; flexor plantar responses b/l    Sensation: Intact light touch/pinprick/vibration UE's/LE's b/l  All decreased in stocking distribution     Coordination/Cerebellum:       Tremors--none      Rapidly alternating movements: no dysdiadochokinesia b/l        Finger-to-Nose: no dysmetria b/l    Gait and stance:      Gait: steady no ataxia       LABS:     Recent Labs     04/09/21  1105 04/10/21  0436   WBC 5.6 8.4   *  --    K 3.8  --      --    CO2 25  --    BUN 19  --    CREATININE 0.9  --    GLUCOSE 115*  --    INR 1.17  --          IMAGING:    CTA:  No acute abnormality or flow-limiting stenosis of the major arteries of the   head and neck.       40% stenosis at the origin of the right vertebral artery.       5 mm lung nodule in the right upper lobe.  Follow-up recommendation is listed   below. MRI Brain:  1. No acute intracranial abnormality. 2. Minimal chronic white matter microvascular ischemic changes. 3. Stable small focus of left parietal encephalomalacia in keeping with   sequela of prior infarct. ASSESSMENT/PLAN:     3 78year old male with acute AMS secondary to TME superimposed on acute hypoxic event with remote left MCA Encephalomalacia, seizure is possible, but description of events not likely- Plan of care as follows:  1. Neuro Exam:  1.  Stocking distribution BLE  2. Neurodiagnostics:  1. MRI brain as above  2. CTA head and neck as above  3. Medications:  1. Eiliquis 5mg BID  2. Statin nightly   4. PT/OT/ST:  1. Per their recommendations   5. Follow up:  1. No further recommendations   2. Follow up in our office          Thank you for allowing us to participate in the care of your patient. If there are any questions regarding evaluation please feel free to contact us.      IFTIKHAR Arita - VICKY, 4/10/2021

## 2021-04-10 NOTE — PLAN OF CARE
Pt had anxious episode after finishing dinner. Face red, tachypneic, c/o shortness of breath, and shaking of extremities. \"I feel like supper got stuck in my esophagus. \" Pt alert and communicative during episode. Gave oxygen for comfort, offered water, and led pt through relaxation/ breathing exercises. Pt gradually became calm and respirations returned to normal. Notified respiratory and RT arrived with albuterol neb. Pt requested xanax, which he takes at home for anxiety.  Notified hospitalist.

## 2021-04-11 LAB
EKG ATRIAL RATE: 66 BPM
EKG DIAGNOSIS: NORMAL
EKG P AXIS: 46 DEGREES
EKG P-R INTERVAL: 186 MS
EKG Q-T INTERVAL: 404 MS
EKG QRS DURATION: 108 MS
EKG QTC CALCULATION (BAZETT): 423 MS
EKG R AXIS: -40 DEGREES
EKG T AXIS: -19 DEGREES
EKG VENTRICULAR RATE: 66 BPM
TROPONIN T: <0.01 NG/ML
TROPONIN T: <0.01 NG/ML

## 2021-04-11 PROCEDURE — G0378 HOSPITAL OBSERVATION PER HR: HCPCS

## 2021-04-11 PROCEDURE — 6370000000 HC RX 637 (ALT 250 FOR IP): Performed by: INTERNAL MEDICINE

## 2021-04-11 PROCEDURE — 6370000000 HC RX 637 (ALT 250 FOR IP): Performed by: NURSE PRACTITIONER

## 2021-04-11 PROCEDURE — 94640 AIRWAY INHALATION TREATMENT: CPT

## 2021-04-11 PROCEDURE — 2580000003 HC RX 258: Performed by: INTERNAL MEDICINE

## 2021-04-11 PROCEDURE — APPSS30 APP SPLIT SHARED TIME 16-30 MINUTES: Performed by: NURSE PRACTITIONER

## 2021-04-11 PROCEDURE — 93005 ELECTROCARDIOGRAM TRACING: CPT | Performed by: NURSE PRACTITIONER

## 2021-04-11 PROCEDURE — 93010 ELECTROCARDIOGRAM REPORT: CPT | Performed by: INTERNAL MEDICINE

## 2021-04-11 PROCEDURE — 94761 N-INVAS EAR/PLS OXIMETRY MLT: CPT

## 2021-04-11 PROCEDURE — 36415 COLL VENOUS BLD VENIPUNCTURE: CPT

## 2021-04-11 PROCEDURE — 84484 ASSAY OF TROPONIN QUANT: CPT

## 2021-04-11 RX ORDER — ALBUTEROL SULFATE 90 UG/1
2 AEROSOL, METERED RESPIRATORY (INHALATION) 4 TIMES DAILY
Status: DISCONTINUED | OUTPATIENT
Start: 2021-04-11 | End: 2021-04-14 | Stop reason: HOSPADM

## 2021-04-11 RX ORDER — PANTOPRAZOLE SODIUM 40 MG/1
40 TABLET, DELAYED RELEASE ORAL
Status: DISCONTINUED | OUTPATIENT
Start: 2021-04-11 | End: 2021-04-14 | Stop reason: HOSPADM

## 2021-04-11 RX ADMIN — DOCUSATE SODIUM 100 MG: 100 CAPSULE ORAL at 09:47

## 2021-04-11 RX ADMIN — L-METHYLFOLATE-ALGAE-VIT B12-B6 CAP 3-90.314-2-35 MG 1 CAPSULE: 3-90.314-2-35 CAP at 09:47

## 2021-04-11 RX ADMIN — PANTOPRAZOLE SODIUM 40 MG: 40 TABLET, DELAYED RELEASE ORAL at 09:47

## 2021-04-11 RX ADMIN — SODIUM CHLORIDE, PRESERVATIVE FREE 10 ML: 5 INJECTION INTRAVENOUS at 22:23

## 2021-04-11 RX ADMIN — METOPROLOL TARTRATE 12.5 MG: 25 TABLET, FILM COATED ORAL at 22:23

## 2021-04-11 RX ADMIN — APIXABAN 5 MG: 5 TABLET, FILM COATED ORAL at 08:34

## 2021-04-11 RX ADMIN — METOPROLOL TARTRATE 12.5 MG: 25 TABLET, FILM COATED ORAL at 08:34

## 2021-04-11 RX ADMIN — MONTELUKAST 10 MG: 10 TABLET, FILM COATED ORAL at 23:15

## 2021-04-11 RX ADMIN — L-METHYLFOLATE-ALGAE-VIT B12-B6 CAP 3-90.314-2-35 MG 1 CAPSULE: 3-90.314-2-35 CAP at 22:24

## 2021-04-11 RX ADMIN — BUDESONIDE AND FORMOTEROL FUMARATE DIHYDRATE 2 PUFF: 80; 4.5 AEROSOL RESPIRATORY (INHALATION) at 07:58

## 2021-04-11 RX ADMIN — ALBUTEROL SULFATE 2 PUFF: 90 AEROSOL, METERED RESPIRATORY (INHALATION) at 19:49

## 2021-04-11 RX ADMIN — ALBUTEROL SULFATE 2 PUFF: 90 AEROSOL, METERED RESPIRATORY (INHALATION) at 15:24

## 2021-04-11 RX ADMIN — ALBUTEROL SULFATE 2 PUFF: 90 AEROSOL, METERED RESPIRATORY (INHALATION) at 11:27

## 2021-04-11 RX ADMIN — LATANOPROST 1 DROP: 50 SOLUTION OPHTHALMIC at 22:23

## 2021-04-11 RX ADMIN — PANTOPRAZOLE SODIUM 40 MG: 40 TABLET, DELAYED RELEASE ORAL at 18:04

## 2021-04-11 RX ADMIN — BUDESONIDE AND FORMOTEROL FUMARATE DIHYDRATE 2 PUFF: 80; 4.5 AEROSOL RESPIRATORY (INHALATION) at 19:51

## 2021-04-11 RX ADMIN — ALBUTEROL SULFATE 2 PUFF: 90 AEROSOL, METERED RESPIRATORY (INHALATION) at 07:59

## 2021-04-11 RX ADMIN — ALPRAZOLAM 0.5 MG: 0.5 TABLET ORAL at 23:15

## 2021-04-11 RX ADMIN — ASPIRIN 81 MG: 81 TABLET, COATED ORAL at 09:47

## 2021-04-11 RX ADMIN — ATORVASTATIN CALCIUM 40 MG: 40 TABLET, FILM COATED ORAL at 22:23

## 2021-04-11 RX ADMIN — APIXABAN 5 MG: 5 TABLET, FILM COATED ORAL at 23:15

## 2021-04-11 ASSESSMENT — PAIN SCALES - GENERAL: PAINLEVEL_OUTOF10: 0

## 2021-04-11 NOTE — CONSULTS
[facility]astroenterology  Gastroenterology Consultation    2021  5:17 PM    Patient:    Sulema Montano  : 1940   78 y.o. MRN: [id number]  Admitted: 2021 10:57 AM ATT: Faina Mahajan MD   [id number]  AdmitDx: Encephalopathy acute [G93.40]  Nonverbal [R47.01]  Altered mental status, unspecified altered mental status type [R41.82]  PCP: Tasia Blair MD    Reason for Consult: dysphagia     Requesting Physician:  Faina Mahajan MD    History Obtained From:  Patient and review of all records    HISTORY OF PRESENT ILLNESS:                The patient is a 78 y.o. male with significant   Past Medical History:   Diagnosis Date    A-fib ([address] 3/31/14    Initial consult    Acute bronchospasm 2016    Acute exacerbation of chronic obstructive pulmonary disease (COPD) ([address] 2018    Asthma     Atrial fibrillation ([address]     Blood glucose elevated 2013    LX=883    Cancer ([address] dx     prostrate- tx surg only/ tx for skin cancer of head now    Chest pain 3/14    COPD, mild ([address] 1791    Diastolic CHF ([address]     Edema     Family history of diabetes mellitus     Brother    Fatigue 3/14    Glaucoma     \"watching this now\" Dr Jake Soto H/O cardiovascular stress test 14EF>55% Atrial septal occluder device noted. No evidence of hemodynamically significant coronary artery disease    H/O cardiovascular stress test 2016    lexiscan-normal,EF70%    H/O echocardiogram 02/12/15    EF 60% Mildly hypertrophied LV and LV systolic function. Mild MR & TR. Atrial septal occluder device is seen with is fuctioning normally.  H/O echocardiogram 2016    EF 60% aaortic root mildly dilated with dimension of 4.3    H/O transesophageal echocardiography (HARLAN) for monitoring 2014    normal septal occluder     Hiatal hernia     planning to see Dr Escobar Leong about this    History of 24 hour EKG monitoring 5/10/14    Sinus rhythm.     History of cardiovascular stress test 12/16/2013 12/2013-([facility])-Probably normal Regadenoson/Exercise with Tc-99 sestamibi. No ischemia noted. Small fixed defect in anteroseptuma and a second small fixed defect in inferolateral wall, most consistent with attenuation artifact. Normal LV size. Global LVSF normal and normal wall motion;    History of echocardiogram 11/11/2013 11/2013-([facility])-Interatrial septal occluder device is visualized. Normal biventricular systolic function; LVEF - 60-65%. Mild degenerative valvular changes; Mild AI, Mild MR, trace TR, trace PI. No pericardial effusion;    History of heart surgery 11/13    PFO closure by Amplatzer 25mm Cribriform device to intra-atrial septum--> [employer])    History of kidney stones     tx with surgery for this in the past    History of nuclear stress test 2/16/15    EF 70%. WNL    Hx of Doppler ultrasound 5/7/2014    Carotid- right and left normal    Hx of Doppler ultrasound 6/30/14    Right groin US: Negative for pseudoaneurysm findings.  Hx of echocardiogram 3/31/2014    mild mitral and tricusid regurg, mildly hypertrophied left ventricle    Hx of motion sickness     and claustrophobia    HX OTHER MEDICAL 2/18/2014-2/24/2014 2/2014-([facility])- 7 day Event Monitor- Five events were available for review. these were all symptom prompted events. Predominant rhythm shows normal sinus rhythm. No significant arrhythmias recorded. -Dr Jan Roblero     \" I have to move at slow pace\"get sob with exertion    Hyperlipidemia     Hypertension     follows with Dr Binta Mills Hypoglycemia     \"dx with this years ago\"    Lung nodule seen on imaging study 1/16/2018    Mild persistent asthma without complication 1/76/6412    Paroxysmal atrial fibrillation (HCC)     symptomatic    Prolonged emergence from general anesthesia     [facility]peripheral vascular disease) [facility]     SOB (shortness of breath) 3/14    Stable angina (HCC)     Syncope and collapse     Thoracic aortic aneurysm (HCC)     4.5cm    Unspecified cerebral artery occlusion with cerebral infarction     multiple CVA's; weakness in right arm; has recurrent TIA's    who presented to [facility] ED with dysphagia, for which GI consulted. Symptoms started about 2 days ago. He reports feeling food stuck in his chest after he ate a biscuit and scrambled eggs. Reports that he has been having increased acid reflux especially at bedtime over the past several weeks. Denies associated nausea, vomiting or abdominal pain. Last BM yesterday. History of EGD12/2020   small hiatal hernia  2) esophageal ring at the SC junction- dilated as described  3) low-grade, benign-appearing stricture in the proximal  descending duopdenum- permitted tight passage of the  scope  4) otherwise normal    History of colonoscopy 3/3/2015 1) 6 mm polyp lower ascending colon- removed               2) sigmoid divertics              3) otherwise normal    No ASA   Denies NSAIDs  Eliquis 5 mg BID     Past Smoker  Denies Alcohol intake    Past Medical History:        Diagnosis Date    A-fib ([address] 3/31/14    Initial consult    Acute bronchospasm 9/20/2016    Acute exacerbation of chronic obstructive pulmonary disease (COPD) ([address] 1/16/2018    Asthma     Atrial fibrillation ([address]     Blood glucose elevated 12/17/2013    IZ=168    Cancer ([address] dx 2003    prostrate- tx surg only/ tx for skin cancer of head now    Chest pain 3/14    COPD, mild ([address] 0/31/1614    Diastolic CHF ([address]     Edema     Family history of diabetes mellitus     Brother    Fatigue 3/14    Glaucoma     \"watching this now\" Dr Dhruv Boyce H/O cardiovascular stress test 4/14/14 5/14EF>55% Atrial septal occluder device noted.  4/14No evidence of hemodynamically significant coronary artery disease    H/O cardiovascular stress test 1/14/2016    lexiscan-normal,EF70%    H/O echocardiogram 02/12/15    EF 60% Mildly hypertrophied LV and LV systolic function. Mild MR & TR. Atrial septal occluder device is seen with is fuctioning normally.  H/O echocardiogram 1/14/2016    EF 60% aaortic root mildly dilated with dimension of 4.3    H/O transesophageal echocardiography (HARLAN) for monitoring 5/22/2014    normal septal occluder     Hiatal hernia     planning to see Dr Lashonda Handley about this    History of 24 hour EKG monitoring 5/10/14    Sinus rhythm.  History of cardiovascular stress test 12/16/2013 12/2013-([facility])-Probably normal Regadenoson/Exercise with Tc-99 sestamibi. No ischemia noted. Small fixed defect in anteroseptuma and a second small fixed defect in inferolateral wall, most consistent with attenuation artifact. Normal LV size. Global LVSF normal and normal wall motion;    History of echocardiogram 11/11/2013 11/2013-([facility])-Interatrial septal occluder device is visualized. Normal biventricular systolic function; LVEF - 60-65%. Mild degenerative valvular changes; Mild AI, Mild MR, trace TR, trace PI. No pericardial effusion;    History of heart surgery 11/13    PFO closure by Amplatzer 25mm Cribriform device to intra-atrial septum--> [employer])    History of kidney stones     tx with surgery for this in the past    History of nuclear stress test 2/16/15    EF 70%. WNL    Hx of Doppler ultrasound 5/7/2014    Carotid- right and left normal    Hx of Doppler ultrasound 6/30/14    Right groin US: Negative for pseudoaneurysm findings.  Hx of echocardiogram 3/31/2014    mild mitral and tricusid regurg, mildly hypertrophied left ventricle    Hx of motion sickness     and claustrophobia    HX OTHER MEDICAL 2/18/2014-2/24/2014 2/2014-([facility])- 7 day Event Monitor- Five events were available for review. these were all symptom prompted events. Predominant rhythm shows normal sinus rhythm.  No significant arrhythmias recorded. -Dr Tera Ayala     \" I have to move at slow pace\"get sob with exertion    Hyperlipidemia     Hypertension     follows with Dr Keli Sousa Hypoglycemia     \"dx with this years ago\"    Lung nodule seen on imaging study 1/16/2018    Mild persistent asthma without complication 0/79/7421    Paroxysmal atrial fibrillation (HCC)     symptomatic    Prolonged emergence from general anesthesia     PVD (peripheral vascular disease) ([address]     SOB (shortness of breath) 3/14    Stable angina (HCC)     Syncope and collapse     Thoracic aortic aneurysm ([address]     4.5cm    Unspecified cerebral artery occlusion with cerebral infarction     multiple CVA's; weakness in right arm; has recurrent TIA's       Past Surgical History:        Procedure Laterality Date    CARDIAC SURGERY  11/11/2013 11/2013-PFO closure by an Amplatzer 25mm Cribriform device in the intra-atrial septum- [id number] [place]  03/2019    one stint placement    CHOLECYSTECTOMY  2001    lap choley-per old chart done [address]  2006    CYSTOSCOPY  1990's    with stone manipulation    ENDOSCOPY, COLON, DIAGNOSTIC  7/24/15    duodenal stricture,fundic gland polyp, dilation 10-15mm, hiatal hernia    ENDOSCOPY, COLON, DIAGNOSTIC  04/26/2017    mild hiatal hernia, duodenal obstruction, possible duodenal trauma r/t dilation, clips placed, stomach polyps x2    EYE SURGERY  2006    per old chart right eye cataract ext with IOL    [address]    per old chart left ing hernia repair done 1970's    JOINT REPLACEMENT Left     hip   [address]    for deviation    OTHER SURGICAL HISTORY  11/11/2013    Dr. Rivka Forbes S# [id number] Model [id number]    OTHER SURGICAL HISTORY  2/2010    EUA with drainage of rectal abscess    PROSTATECTOMY  2004    ROTATOR CUFF REPAIR Right 2008    SKIN BIOPSY      head squamous cell [facility]    UPPER GASTROINTESTINAL ENDOSCOPY N/A 12/23/2020    EGD DILATION BALLOON 18-20 BALLOON DILATED TO 20 performed by Cody Walker MD at [address] [facility]         Current Medications:    Medications    Scheduled Medications:    albuterol sulfate HFA  2 puff Inhalation 4x daily    L-methylfolate-B6-B12  1 capsule Oral BID    docusate sodium  100 mg Oral Daily    montelukast  10 mg Oral Nightly    pantoprazole  40 mg Oral Daily    atorvastatin  40 mg Oral Nightly    aspirin  81 mg Oral Daily    latanoprost  1 drop Both Eyes Nightly    apixaban  5 mg Oral BID    metoprolol tartrate  12.5 mg Oral BID    budesonide-formoterol  2 puff Inhalation BID    sodium chloride flush  5-40 mL Intravenous 2 times per day    sodium chloride flush  5-40 mL Intravenous 2 times per day     PRN Medications: ALPRAZolam, nitroGLYCERIN, albuterol, sodium chloride flush, sodium chloride, polyethylene glycol, acetaminophen **OR** acetaminophen, promethazine **OR** ondansetron, sodium chloride flush, sodium chloride      Allergies:  Aminophylline, Contrast [iodides], Other, Pentazocine lactate [pentazocine], Prednisone, and Theophyllines    Social History:   TOBACCO:   reports that he quit smoking about 51 years ago. He has a 5.00 pack-year smoking history. He has never used smokeless tobacco.  ETOH:   reports current alcohol use. Family History:       Problem Relation Age of Onset    Heart Disease Father     Coronary Art Dis Father     Coronary Art Dis Brother     Heart Disease Sister     Diabetes Sister     Diabetes Sister     Diabetes Sister     Heart Disease Brother     Diabetes Brother     Heart Disease Brother        No family history of colon cancer, Crohn's disease, or ulcerative colitis.     REVIEW OF SYSTEMS:    The positive ROS will be identified in bold, otherwise ROS are negative     CONSTITUTIONAL:  Neg   Recent weight changes, fatigue, fever, chills or night sweats  EYES:  Neg  Blurriness, earing, itching or acute change in vision  EARS:  Neg  hearing loss, tinnitus, vertigo, discharge or earache. NOSE:  Neg  Rhinorrhea, sneezing, itching, allergy or epistaxis  MOUTH/THROAT:  Neg  bleeding gums, hoarseness or sore throat. RESPIRATORY:   Neg SOB, wheeze, cough, sputum, hemoptysis or bronchitis  CARDIOVASCULAR:  Neg chest pain, palpitations, dyspnea on exertion, orthopnea, paroxysmal nocturnal dyspnea or edema  GASTROINTESTINAL:  SEE HPI  GENITOURINARY:  Neg  Urinary frequency, hesitancy, urgency, polyuria, dysuria, hematuria, nocturia, or incontinence. HEMATOLOGIC/LYMPHATIC:  Neg  Anemia, bleeding tendency  MUSCULOSKELETAL:    New myalgias, bone pain, joint pain, swelling or stiffness and has had change in gait. NEUROLOGICAL:  Neg  Loss of Consciousness, memory loss, forgetfulness, periods of confusion, difficulty concentrating, seizures, decline in intellect, nervousness, insomina, aphasia or dysarthria. SKIN:  Neg  skin or hair changes, and has no itching, rashes, or sores. PSYCHIATRIC:  Neg depression, personality changes, anxiety. ENDOCRINE:  Neg polydipsia, polyuria, abnormal weight changes, heat /cold intolerance.   ALL/IMM:  Neg reactions to drugs other than listed    PHYSICAL EXAM:      Vitals:    BP 92/68   Pulse 67   Temp 97.6 °F (36.4 °C) (Oral)   Resp 17   Ht 5' 4\" (1.626 m)   Wt 178 lb 8 oz (81 kg)   SpO2 98%   BMI 30.64 kg/m²     General Appearance:    Alert, cooperative, no distress, appears stated age   HEENT:    Normocephalic, atraumatic, Conjunctiva clear, Lips, mucosa, and tongue normal; teeth and gums normal   Neck:   Supple, symmetrical, trachea midline   Lungs:     Clear to auscultation bilaterally, respirations unlabored   Chest Wall:    No tenderness or deformity    Heart:    Regular rate and rhythm, S1 and S2 normal   Abdomen:     Soft, non-tender, bowel sounds active all four quadrants,     no masses, no organomegaly, no ascites Rectal:    Deferred   Extremities:   Extremities normal, atraumatic, no cyanosis or edema   Pulses:   2+ and symmetric all extremities   Skin:   Skin color, texture, turgor normal, no rashes or lesions   Lymph nodes:   No abnormality   Neurologic:   No focal deficits, moving all four extremities      DATA:    ABGs: No results found for: PHART, PO2ART, XCX6VKF  CBC:   Recent Labs     04/09/21  1105 04/10/21  0436   WBC 5.6 8.4   HGB 16.1 16.0    189     BMP:    Recent Labs     04/09/21  1105   *   K 3.8      CO2 25   BUN 19   CREATININE 0.9   GLUCOSE 115*     Magnesium:   Lab Results   Component Value Date    MG 2.1 04/09/2021     Hepatic:   Recent Labs     04/09/21  1105   AST 39*   ALT 28   BILITOT 1.3*   ALKPHOS 74     No results for input(s): LIPASE, AMYLASE in the last 72 hours. Recent Labs     04/09/21  1105   PROTIME 14.2   INR 1.17     No results for input(s): PTT in the last 72 hours.   Lipids:   Recent Labs     04/10/21  0436   CHOL 132   HDL 64     INR:   Recent Labs     04/09/21  1105   INR 1.17     TSH:   Lab Results   Component Value Date    TSH 1.01 02/13/2014       Intake/Output Summary (Last 24 hours) at 4/11/2021 1717  Last data filed at 4/11/2021 1405  Gross per 24 hour   Intake 480 ml   Output --   Net 480 ml      sodium chloride      sodium chloride         Imaging Studies: Reviewed     IMPRESSION:      Patient Active Problem List   Diagnosis Code    Pneumonia J18.9    Hypertension I10    Hyperlipemia E78.5    Chest pain on exertion R07.9    SOB (shortness of breath) R06.02    General weakness R53.1    PFO (patent foramen ovale) Q21.1    History of 24 hour EKG monitoring X49.74    Diastolic CHF (HCC) U87.72    Stable angina (HCC) I20.8    Edema R60.9    Groin discomfort R10.30    Hx of Doppler ultrasound Z92.89    Hx of Doppler ultrasound Z92.89    Ileus (HCC) K56.7    Mild persistent asthma without complication N55.35    Acute bronchospasm J98.01    COPD, mild (HCC) J44.9    Asthma exacerbation J45. 0    Hiatal hernia K44.9    Pancreatitis, unspecified pancreatitis type K85.90    Acute exacerbation of chronic obstructive pulmonary disease (COPD) (HCC) J44.1    Lung nodule seen on imaging study R91.1    TIA (transient ischemic attack) G45.9    Seizure ([address] R56.9    Altered mental status R41.82    Thoracic aortic aneurysm (HCC) I71.2    Encephalopathy acute G93.40       ASSESSMENT/RECOMMENDATIONS:    Dysphagia:  May be uncontrolled GERD, may be anxiety, may be stricture  Currently on Eliquis for afib  Continue conservative management at this time   Recommend Protonix 40 mg BID  Pt known to Dr. Madhu Fernando, who will resume care tmrw, 4/12/2021    Discussed plan of care with patient, wife, and RN     Patient clinical, biochemical, and radiological information discussed with Dr. Ulises Mccall. He agrees with the assessment and plan. Zuhair Lyons, CNP  4/11/2021  5:17 PM     Most probbaly GERD related Esophagitis  PPI BID    I have seen and examined this patient personally, and independently of the nurse practitioner. The plan was developed mutually at the time of the visit with the patient. Fern Peralta and myself have spoken with patient, nursing staff and provided written and verbal instructions .     The above note has been reviewed and I agree with the Assessment,  Diagnosis, and Treatment plan as suggested by [facility] gastro[facility]

## 2021-04-11 NOTE — PROGRESS NOTES
Cardiology Progress Note     Today's Plan- sign off and be avaialble if needed   recommend to follow up with his primary cardiologist  Dr Radha Camargo at [address] Date:  4/9/2021    Consult reason/ Seen today for: syncope     Subjective and  Overnight Events:  No further chest pain   Reports he again had an episode of choking last pm with eating chicken. Telemetry SR     Assessment / Plan / Recommendation:     1. Syncope- does not appear to be cardiac in nature ? Hypoxic d/t choking event   2. Chest pain:  trend troponin are negative -will repeat EKG   3. ASCVD: h/o PTCA with [facility] in 2019- had LHC in November 2020 - patent stent and non obstructive CAD ( done at [facility]) recommend to  continue aspirin, atorvastatin and metoprolol. No further testing at this time   4. PAF- currently is Sinus- recommend to continue with anticoagulation and BB      History of Presenting Illness:    Chief complain on admission : 78 y. o.year old who is admitted for  Chief Complaint   Patient presents with    Altered Mental Status        Past medical history:    has a past medical history of A-fib ([address] Acute bronchospasm, Acute exacerbation of chronic obstructive pulmonary disease (COPD) ([address] Asthma, Atrial fibrillation ([address] Blood glucose elevated, Cancer ([address] Chest pain, COPD, mild ([address] Diastolic CHF ([address] Edema, Family history of diabetes mellitus, Fatigue, Glaucoma, H/O cardiovascular stress test, H/O cardiovascular stress test, H/O echocardiogram, H/O echocardiogram, H/O transesophageal echocardiography (HARLAN) for monitoring, Hiatal hernia, History of 24 hour EKG monitoring, History of cardiovascular stress test, History of echocardiogram, History of heart surgery, History of kidney stones, History of nuclear stress test, Hx of Doppler ultrasound, Hx of Doppler ultrasound, Hx of echocardiogram, Hx of motion sickness, HX OTHER MEDICAL, HX PT/INR:   Recent Labs     04/09/21  1105   PROTIME 14.2   INR 1.17     BNP:  No results for input(s): PROBNP in the last 72 hours. TROPONIN:   Recent Labs     04/10/21  1056 04/10/21  2036 04/11/21  0320   TROPONINT <0.010 <0.010 <0.010              Impression:  Active Problems:    Encephalopathy acute  Resolved Problems:    * No resolved hospital problems. *       All labs, medications and tests reviewed by myself, continue all other medications of all above medical condition listed as is except for changes mentioned above. Thank you   Please call with questions. Electronically signed by IFTIKHAR Varner CNP on 4/11/2021 at 11:27 AM  barrett  I have seen ,spoken to  and examined this patient personally, independently of the nurse practitioner. I have reviewed the hospital care given to date and reviewed all pertinent labs and imaging. The plan was developed mutually at the time of the visit with the patient,  NP  and myself. I have spoken with patient, nursing staff and provided written and verbal instructions . The above note has been reviewed and I agree with the assessment, diagnosis, and treatment plan with changes made by me as follows     CARDIOLOGY ATTENDING ADDENDUM    HPI:  I have reviewed the above HPI  And agree with above   Stiven Dominguez is a 78 y. o.year old who and presents with had concerns including Altered Mental Status. Chief Complaint   Patient presents with    Altered Mental Status     Interval history:  No CP    Physical Exam:  General:  Awake, alert, NAD  Head:normal  Eye:normal  Neck:  No JVD   Chest:  Clear to auscultation, respiration easy  Cardiovascular:  RRR S1S2  Abdomen:   nontender  Extremities:  no edema  Pulses; palpable  Neuro: grossly normal      MEDICAL DECISION MAKING;    I agree with the above plan, which was planned by myself and discussed with NP.   Stable cardiac status  Will fu as needed        David Amezquita MD [facility]

## 2021-04-11 NOTE — PROGRESS NOTES
04/09/21 2149    apixaban (ELIQUIS) tablet 5 mg  5 mg Oral BID Mirian Parish MD   5 mg at 04/11/21 0834    nitroGLYCERIN (NITROSTAT) SL tablet 0.4 mg  0.4 mg Sublingual Q5 Min PRN Mirian Parish MD   0.4 mg at 04/09/21 1745    metoprolol tartrate (LOPRESSOR) tablet 12.5 mg  12.5 mg Oral BID Mirian Parish MD   12.5 mg at 04/11/21 0834    albuterol (PROVENTIL) nebulizer solution 2.5 mg  2.5 mg Nebulization PRN Mirian Parish MD   2.5 mg at 04/10/21 1902    budesonide-formoterol (SYMBICORT) 80-4.5 MCG/ACT inhaler 2 puff  2 puff Inhalation BID Mirian Parish MD   2 puff at 04/11/21 0758    sodium chloride flush 0.9 % injection 5-40 mL  5-40 mL Intravenous 2 times per day Mirian Parish MD   10 mL at 04/10/21 2228    sodium chloride flush 0.9 % injection 5-40 mL  5-40 mL Intravenous PRN Mirian Parish MD        0.9 % sodium chloride infusion  25 mL Intravenous PRN Mirian Parish MD        polyethylene glycol (GLYCOLAX) packet 17 g  17 g Oral Daily PRN Mirian Parish MD        acetaminophen (TYLENOL) tablet 650 mg  650 mg Oral Q6H PRN Mirian Parish MD        Or    acetaminophen (TYLENOL) suppository 650 mg  650 mg Rectal Q6H PRN Mirian Parish MD        promethazine (PHENERGAN) tablet 12.5 mg  12.5 mg Oral Q6H PRN Mirian Parish MD        Or    ondansetron [facility]) injection 4 mg  4 mg Intravenous Q6H PRN Mirian Parish MD        sodium chloride flush 0.9 % injection 5-40 mL  5-40 mL Intravenous 2 times per day Mirian Parish MD   10 mL at 04/10/21 0900    sodium chloride flush 0.9 % injection 5-40 mL  5-40 mL Intravenous PRN Mirian Parish MD        0.9 % sodium chloride infusion  25 mL Intravenous PRN Mirian Parish MD           Subjective:     Patient denied any new complaints. He says ate breakfast but does not feel right.   Feels like he is going to throw up. He had similar episode last night as well  Did sleep okay last night    Objective:   No intake or output data in the 24 hours ending 04/11/21 1143   Vitals:   Vitals:    04/11/21 1127   BP:    Pulse:    Resp: 14   Temp:    SpO2: 97%     Physical Exam:  General Appearance:    Alert, cooperative, no distress  Head:      Normocephalic, without obvious abnormality, atraumatic  Eyes:       Conjunctiva/corneas clear, EOM's intact  Lungs:    Clear to auscultation bilaterally, respirations unlabored  Heart:                Regular rate and rhythm, S1 and S2 normal, no murmur,   rub or gallop  Abdomen:     Soft, non-tender, bowel sounds active, no masses, no organomegaly  Extremities:   Extremities normal, atraumatic, no cyanosis or edema  Neurological:   Grossly Intact. Significant Diagnostic Studies:   DATA:    CBC   Recent Labs     04/09/21  1105 04/10/21  0436   WBC 5.6 8.4   HGB 16.1 16.0   HCT 50.4 48.6    189      BMP   Recent Labs     04/09/21  1105   *   K 3.8      CO2 25   BUN 19   CREATININE 0.9     LFT'S   Recent Labs     04/09/21  1105   AST 39*   ALT 28   BILITOT 1.3*   ALKPHOS 74     COAG   Recent Labs     04/09/21  1105   INR 1.17     POC:   Lab Results   Component Value Date    POCGLU 119 04/10/2021    POCGLU 151 04/09/2021    POCGLU 80 03/13/2019     [id number]:  Lab Results   Component Value Date    LABA1C 5.6 04/10/2021     CARDIAC ENZYMES  No results for input(s): CKTOTAL, CKMB, CKMBINDEX, TROPONINI in the last 72 hours. Troponin:   Recent Labs     04/10/21  1056 04/10/21  2036 04/11/21  0320   TROPONINT <0.010 <0.010 <0.010     BNP: No results for input(s): PROBNP in the last 72 hours.   U/A:    Lab Results   Component Value Date    COLORU STRAW 04/09/2021    WBCUA <1 04/09/2021    RBCUA 1 04/09/2021    MUCUS RARE 04/09/2021    BACTERIA NEGATIVE 04/09/2021    CLARITYU CLEAR 04/09/2021    SPECGRAV 1.021 04/09/2021    LEUKOCYTESUR NEGATIVE 04/09/2021    BLOODU NEGATIVE 04/09/2021       Ct Head Wo Contrast    Addendum Date: 4/9/2021    ADDENDUM: Results reported to Dr. Eyad Terrazas at 11:22 a.m. on April 9, 2021. Result Date: 4/9/2021  EXAMINATION: CT OF THE HEAD WITHOUT CONTRAST  4/9/2021 11:03 am TECHNIQUE: CT of the head was performed without the administration of intravenous contrast. Dose modulation, iterative reconstruction, and/or weight based adjustment of the mA/kV was utilized to reduce the radiation dose to as low as reasonably achievable. COMPARISON: CT head March 13, 2019 HISTORY: ORDERING SYSTEM PROVIDED HISTORY: AMS TECHNOLOGIST PROVIDED HISTORY: Reason for exam:->AMS Has a \"code stroke\" or \"stroke alert\" been called? ->Yes Decision Support Exception->Emergency Medical Condition (MA) Reason for Exam: STROKE ALERT;AMS Acuity: Acute Type of Exam: Initial FINDINGS: BRAIN/VENTRICLES: There is mild parenchymal volume loss. There is periventricular white matter low attenuation, likely related to mild chronic microvascular disease. There is no acute intracranial hemorrhage, mass effect or midline shift. No abnormal extra-axial fluid collection. There is a small old infarction in the left parietal lobe, stable. No evidence of acute territorial infarction. There is no hydrocephalus. ORBITS: The visualized portion of the orbits demonstrate no acute abnormality. SINUSES: The visualized paranasal sinuses and mastoid air cells demonstrate no acute abnormality. SOFT TISSUES/SKULL:  No acute abnormality of the visualized skull or soft tissues. No acute intracranial abnormality. Small old infarction in the left parietal lobe, stable. No evidence of acute territorial infarction. Mild parenchymal volume loss. Mild chronic microvascular disease.      Xr Chest Portable    Result Date: 4/9/2021  EXAMINATION: ONE XRAY VIEW OF THE CHEST 4/9/2021 11:55 am COMPARISON: December 23, 2020 HISTORY: ORDERING SYSTEM PROVIDED HISTORY: AMS TECHNOLOGIST PROVIDED HISTORY: Reason for lymphadenopathy. The larynx and pharynx are unremarkable. No acute abnormality of the salivary and thyroid glands. BONES: No acute osseous abnormality. CTA HEAD: ANTERIOR CIRCULATION: No significant stenosis of the intracranial internal carotid, anterior cerebral, or middle cerebral arteries. No aneurysm. POSTERIOR CIRCULATION: There is left dominance of the vertebral arteries with hypoplastic right vertebral artery. No significant stenosis of the vertebral, basilar, or posterior cerebral arteries. No aneurysm. OTHER: No dural venous sinus thrombosis on this non-dedicated study. BRAIN: No mass effect or midline shift. No extra-axial fluid collection. The gray-white differentiation is maintained. No acute abnormality or flow-limiting stenosis of the major arteries of the head and neck. 40% stenosis at the origin of the right vertebral artery. 5 mm lung nodule in the right upper lobe. Follow-up recommendation is listed below. RECOMMENDATIONS: Fleischner Society guidelines for follow-up and management of incidentally detected pulmonary nodules: Multiple Solid Nodules: Nodule size less than 6 mm In a low-risk patient, no routine follow-up. In a high-risk patient, optional CT at 12 months. - Low risk patients include individuals with minimal or absent history of smoking and other known risk factors. - High risk patients include individuals with a history or smoking or known risk factors. Radiology 2017 http://pubs. rsna.org/doi/full/10.1148/radiol. 8041820898     Mri Brain Wo Contrast    Result Date: 4/9/2021  EXAMINATION: MRI OF THE BRAIN WITHOUT CONTRAST  4/9/2021 2:32 pm TECHNIQUE: Multiplanar multisequence MRI of the brain was performed without the administration of intravenous contrast. COMPARISON: 02/26/2019 HISTORY: ORDERING SYSTEM PROVIDED HISTORY: To r/o stroke TECHNOLOGIST PROVIDED HISTORY: Reason for exam:->To r/o stroke Reason for Exam: R/O CVA Acuity: Acute Type of Exam: Initial Relevant

## 2021-04-12 LAB — TROPONIN T: <0.01 NG/ML

## 2021-04-12 PROCEDURE — 84484 ASSAY OF TROPONIN QUANT: CPT

## 2021-04-12 PROCEDURE — G0378 HOSPITAL OBSERVATION PER HR: HCPCS

## 2021-04-12 PROCEDURE — 6370000000 HC RX 637 (ALT 250 FOR IP): Performed by: NURSE PRACTITIONER

## 2021-04-12 PROCEDURE — 99212 OFFICE O/P EST SF 10 MIN: CPT | Performed by: SPECIALIST

## 2021-04-12 PROCEDURE — 94761 N-INVAS EAR/PLS OXIMETRY MLT: CPT

## 2021-04-12 PROCEDURE — 36415 COLL VENOUS BLD VENIPUNCTURE: CPT

## 2021-04-12 PROCEDURE — 6370000000 HC RX 637 (ALT 250 FOR IP): Performed by: INTERNAL MEDICINE

## 2021-04-12 PROCEDURE — 2580000003 HC RX 258: Performed by: INTERNAL MEDICINE

## 2021-04-12 PROCEDURE — 94640 AIRWAY INHALATION TREATMENT: CPT

## 2021-04-12 RX ADMIN — SODIUM CHLORIDE, PRESERVATIVE FREE 10 ML: 5 INJECTION INTRAVENOUS at 11:21

## 2021-04-12 RX ADMIN — L-METHYLFOLATE-ALGAE-VIT B12-B6 CAP 3-90.314-2-35 MG 1 CAPSULE: 3-90.314-2-35 CAP at 11:23

## 2021-04-12 RX ADMIN — ALBUTEROL SULFATE 2 PUFF: 90 AEROSOL, METERED RESPIRATORY (INHALATION) at 16:02

## 2021-04-12 RX ADMIN — METOPROLOL TARTRATE 12.5 MG: 25 TABLET, FILM COATED ORAL at 21:22

## 2021-04-12 RX ADMIN — ATORVASTATIN CALCIUM 40 MG: 40 TABLET, FILM COATED ORAL at 21:23

## 2021-04-12 RX ADMIN — LATANOPROST 1 DROP: 50 SOLUTION OPHTHALMIC at 21:23

## 2021-04-12 RX ADMIN — SODIUM CHLORIDE, PRESERVATIVE FREE 10 ML: 5 INJECTION INTRAVENOUS at 21:22

## 2021-04-12 RX ADMIN — ALBUTEROL SULFATE 2 PUFF: 90 AEROSOL, METERED RESPIRATORY (INHALATION) at 08:51

## 2021-04-12 RX ADMIN — ALBUTEROL SULFATE 2 PUFF: 90 AEROSOL, METERED RESPIRATORY (INHALATION) at 19:51

## 2021-04-12 RX ADMIN — BUDESONIDE AND FORMOTEROL FUMARATE DIHYDRATE 2 PUFF: 80; 4.5 AEROSOL RESPIRATORY (INHALATION) at 19:51

## 2021-04-12 RX ADMIN — PANTOPRAZOLE SODIUM 40 MG: 40 TABLET, DELAYED RELEASE ORAL at 11:20

## 2021-04-12 RX ADMIN — BUDESONIDE AND FORMOTEROL FUMARATE DIHYDRATE 2 PUFF: 80; 4.5 AEROSOL RESPIRATORY (INHALATION) at 08:51

## 2021-04-12 RX ADMIN — MONTELUKAST 10 MG: 10 TABLET, FILM COATED ORAL at 21:22

## 2021-04-12 RX ADMIN — ALPRAZOLAM 0.5 MG: 0.5 TABLET ORAL at 21:25

## 2021-04-12 RX ADMIN — ASPIRIN 81 MG: 81 TABLET, COATED ORAL at 11:20

## 2021-04-12 RX ADMIN — METOPROLOL TARTRATE 12.5 MG: 25 TABLET, FILM COATED ORAL at 11:20

## 2021-04-12 RX ADMIN — PANTOPRAZOLE SODIUM 40 MG: 40 TABLET, DELAYED RELEASE ORAL at 19:02

## 2021-04-12 RX ADMIN — DOCUSATE SODIUM 100 MG: 100 CAPSULE ORAL at 11:20

## 2021-04-12 RX ADMIN — L-METHYLFOLATE-ALGAE-VIT B12-B6 CAP 3-90.314-2-35 MG 1 CAPSULE: 3-90.314-2-35 CAP at 21:23

## 2021-04-12 ASSESSMENT — PAIN SCALES - GENERAL
PAINLEVEL_OUTOF10: 2
PAINLEVEL_OUTOF10: 0

## 2021-04-12 ASSESSMENT — PAIN DESCRIPTION - PROGRESSION: CLINICAL_PROGRESSION: NOT CHANGED

## 2021-04-12 ASSESSMENT — PAIN DESCRIPTION - ORIENTATION: ORIENTATION: LEFT;LOWER

## 2021-04-12 ASSESSMENT — PAIN DESCRIPTION - DESCRIPTORS: DESCRIPTORS: DISCOMFORT

## 2021-04-12 ASSESSMENT — PAIN DESCRIPTION - PAIN TYPE: TYPE: ACUTE PAIN

## 2021-04-12 NOTE — PROGRESS NOTES
Hospitalist Progress Note         Admit Date: 4/9/2021    PCP: Anthony Haley MD     Chief Complaint   Patient presents with    Altered Mental Status        Assessment and Plan:     -Dysphagia with prior history of esophageal strictures/dilatation procedures. Evaluated by GI-recommend EGD Wednesday. SLP recommended dysphagia diet  -Acute metabolic encephalopathy improved. CVA work-up unremarkable.  -Paroxysmal A. fib currently rate controlled on metoprolol. Eliquis on hold for EGD. -History CVA with left parietal lobe involvement on aspirin and statin.  -Chronic diastolic CHF Lasix on hold. Recent echo EF normal.  G1 DD.  -History of anxiety continue Xanax. -COPD continue INH. Stable    Case discussed with patient and his wife at bedside. Agree with current management.     Current Facility-Administered Medications   Medication Dose Route Frequency Provider Last Rate Last Admin    albuterol sulfate  (90 Base) MCG/ACT inhaler 2 puff  2 puff Inhalation 4x daily Jesse Macdonald MD   2 puff at 04/11/21 1949    pantoprazole (PROTONIX) tablet 40 mg  40 mg Oral BID AC IFTIKHAR Boyd CNP   40 mg at 04/11/21 1804    L-methylfolate-B6-B12 3-35-2 mg (METANX) capsule  1 capsule Oral BID Jesse Macdonald MD   1 capsule at 04/11/21 2224    ALPRAZolam (XANAX) tablet 0.5 mg  0.5 mg Oral Nightly PRN IFTIKHAR Eid - CNP   0.5 mg at 04/11/21 2315    docusate sodium (COLACE) capsule 100 mg  100 mg Oral Daily IFTIKHAR Combs CNP   100 mg at 04/11/21 0947    montelukast (SINGULAIR) tablet 10 mg  10 mg Oral Nightly Megha Katz MD   10 mg at 04/11/21 2315    atorvastatin (LIPITOR) tablet 40 mg  40 mg Oral Nightly Megha Katz MD   40 mg at 04/11/21 2223    aspirin EC tablet 81 mg  81 mg Oral Daily Megha Katz MD   81 mg at 04/11/21 0947    latanoprost (XALATAN) 0.005 % ophthalmic solution 1 drop  1 drop Both Eyes Nightly Megha Katz MD   1 drop at 04/11/21 2223    [Held by provider] apixaban (ELIQUIS) tablet 5 mg  5 mg Oral BID Juanita Infante MD   5 mg at 04/11/21 2315    nitroGLYCERIN (NITROSTAT) SL tablet 0.4 mg  0.4 mg Sublingual Q5 Min PRN Juanita Infante MD   0.4 mg at 04/09/21 1745    metoprolol tartrate (LOPRESSOR) tablet 12.5 mg  12.5 mg Oral BID Juanita Infanet MD   12.5 mg at 04/11/21 2223    albuterol (PROVENTIL) nebulizer solution 2.5 mg  2.5 mg Nebulization PRN Juanita Infante MD   2.5 mg at 04/10/21 1902    budesonide-formoterol (SYMBICORT) 80-4.5 MCG/ACT inhaler 2 puff  2 puff Inhalation BID Juanita Infante MD   2 puff at 04/11/21 1951    sodium chloride flush 0.9 % injection 5-40 mL  5-40 mL Intravenous 2 times per day Juanita Infante MD   10 mL at 04/10/21 2228    sodium chloride flush 0.9 % injection 5-40 mL  5-40 mL Intravenous PRN Juanita Infante MD        0.9 % sodium chloride infusion  25 mL Intravenous PRN Juanita Infante MD        polyethylene glycol (GLYCOLAX) packet 17 g  17 g Oral Daily PRN Juanita Infante MD        acetaminophen (TYLENOL) tablet 650 mg  650 mg Oral Q6H PRN Juanita Infante MD        Or    acetaminophen (TYLENOL) suppository 650 mg  650 mg Rectal Q6H PRN Juanita Infante MD        promethazine (PHENERGAN) tablet 12.5 mg  12.5 mg Oral Q6H PRN Juanita Infante MD        Or    ondansetron [facility]) injection 4 mg  4 mg Intravenous Q6H PRN Juanita Infante MD        sodium chloride flush 0.9 % injection 5-40 mL  5-40 mL Intravenous 2 times per day Juanita Infante MD   10 mL at 04/11/21 2223    sodium chloride flush 0.9 % injection 5-40 mL  5-40 mL Intravenous PRN Juanita Infante MD        0.9 % sodium chloride infusion  25 mL Intravenous PRN Juanita Infante MD           Subjective:     Patient is sitting in chair and eating slowly.   Concerned about episodic cough with choking spells. He says he feels that it is coming. Last night, says, luckily avoided the episode by laying in bed. Objective: Intake/Output Summary (Last 24 hours) at 4/12/2021 0804  Last data filed at 4/11/2021 1727  Gross per 24 hour   Intake 960 ml   Output --   Net 960 ml      Vitals:   Vitals:    04/12/21 0400   BP: (!) 104/59   Pulse: 62   Resp: 14   Temp:    SpO2:      Physical Exam:  General Appearance:    Alert, cooperative, no distress  Head:      Normocephalic, without obvious abnormality, atraumatic  Eyes:       Conjunctiva/corneas clear, EOM's intact  Lungs:    Clear to auscultation bilaterally, respirations unlabored  Heart:                Regular rate and rhythm, S1 and S2 normal, no murmur,   rub or gallop  Abdomen:     Soft, non-tender, bowel sounds active, no masses, no organomegaly  Extremities:   Extremities normal, atraumatic, no cyanosis or edema  Neurological:   Grossly Intact. Significant Diagnostic Studies:   DATA:    CBC   Recent Labs     04/09/21  1105 04/10/21  0436   WBC 5.6 8.4   HGB 16.1 16.0   HCT 50.4 48.6    189      BMP   Recent Labs     04/09/21  1105   *   K 3.8      CO2 25   BUN 19   CREATININE 0.9     LFT'S   Recent Labs     04/09/21  1105   AST 39*   ALT 28   BILITOT 1.3*   ALKPHOS 74     COAG   Recent Labs     04/09/21  1105   INR 1.17     POC:   Lab Results   Component Value Date    POCGLU 119 04/10/2021    POCGLU 151 04/09/2021    POCGLU 80 03/13/2019     [id number]:  Lab Results   Component Value Date    LABA1C 5.6 04/10/2021     CARDIAC ENZYMES  No results for input(s): CKTOTAL, CKMB, CKMBINDEX, TROPONINI in the last 72 hours. Troponin:   Recent Labs     04/10/21  2036 04/11/21  0320 04/11/21  1803   TROPONINT <0.010 <0.010 <0.010     BNP: No results for input(s): PROBNP in the last 72 hours.   U/A:    Lab Results   Component Value Date    COLORU STRAW 04/09/2021    WBCUA <1 04/09/2021    RBCUA 1 04/09/2021    MUCUS RARE 04/09/2021    BACTERIA NEGATIVE 04/09/2021    CLARITYU CLEAR 04/09/2021    SPECGRAV 1.021 04/09/2021    LEUKOCYTESUR NEGATIVE 04/09/2021    BLOODU NEGATIVE 04/09/2021       Ct Head Wo Contrast    Addendum Date: 4/9/2021    ADDENDUM: Results reported to Dr. Anum Peterson at 11:22 a.m. on April 9, 2021. Result Date: 4/9/2021  EXAMINATION: CT OF THE HEAD WITHOUT CONTRAST  4/9/2021 11:03 am TECHNIQUE: CT of the head was performed without the administration of intravenous contrast. Dose modulation, iterative reconstruction, and/or weight based adjustment of the mA/kV was utilized to reduce the radiation dose to as low as reasonably achievable. COMPARISON: CT head March 13, 2019 HISTORY: ORDERING SYSTEM PROVIDED HISTORY: [facility] TECHNOLOGIST PROVIDED HISTORY: Reason for exam:->AMS Has a \"code stroke\" or \"stroke alert\" been called? ->Yes Decision Support Exception->Emergency Medical Condition (MA) Reason for Exam: STROKE ALERT;AMS Acuity: Acute Type of Exam: Initial FINDINGS: BRAIN/VENTRICLES: There is mild parenchymal volume loss. There is periventricular white matter low attenuation, likely related to mild chronic microvascular disease. There is no acute intracranial hemorrhage, mass effect or midline shift. No abnormal extra-axial fluid collection. There is a small old infarction in the left parietal lobe, stable. No evidence of acute territorial infarction. There is no hydrocephalus. ORBITS: The visualized portion of the orbits demonstrate no acute abnormality. SINUSES: The visualized paranasal sinuses and mastoid air cells demonstrate no acute abnormality. SOFT TISSUES/SKULL:  No acute abnormality of the visualized skull or soft tissues. No acute intracranial abnormality. Small old infarction in the left parietal lobe, stable. No evidence of acute territorial infarction. Mild parenchymal volume loss. Mild chronic microvascular disease.      Xr Chest Portable    Result Date: 4/9/2021  EXAMINATION: ONE XRAY VIEW OF THE CHEST 4/9/2021 11:55 am COMPARISON: December 23, 2020 HISTORY: ORDERING SYSTEM PROVIDED HISTORY: AMS TECHNOLOGIST PROVIDED HISTORY: Reason for exam:->AMS Reason for Exam: AMS Acuity: Acute Type of Exam: Initial FINDINGS: The cardiomediastinal silhouette is stable. There is mild pulmonary vascular congestion. There is mild left pleural effusion. There is no pneumothorax. There is no acute osseous abnormality. Mild pulmonary vascular congestion. Stable mild cardiomegaly. Mild left pleural effusion. Cta Head Neck W Contrast    Result Date: 4/9/2021  EXAMINATION: CTA OF THE HEAD AND NECK WITH CONTRAST 4/9/2021 11:11 am: TECHNIQUE: CTA of the head and neck was performed with the administration of intravenous contrast. Multiplanar reformatted images are provided for review. MIP images are provided for review. Stenosis of the internal carotid arteries measured using NASCET criteria. Dose modulation, iterative reconstruction, and/or weight based adjustment of the mA/kV was utilized to reduce the radiation dose to as low as reasonably achievable. COMPARISON: Noncontrast CT head from earlier today HISTORY: ORDERING SYSTEM PROVIDED HISTORY: AMS , previous stroke TECHNOLOGIST PROVIDED HISTORY: Reason for exam:->AMS , previous stroke Decision Support Exception->Emergency Medical Condition (MA) Reason for Exam: AMS , previous stroke Acuity: Acute Type of Exam: Initial FINDINGS: CTA NECK: AORTIC ARCH/ARCH VESSELS: No dissection or arterial injury. No significant stenosis of the brachiocephalic or subclavian arteries. CAROTID ARTERIES: No dissection, arterial injury, or hemodynamically significant stenosis by NASCET criteria. VERTEBRAL ARTERIES: There is left dominance of the vertebral arteries. There is 40% stenosis at the origin of the right vertebral artery. No dissection, arterial injury, or significant stenosis. SOFT TISSUES: There is 5 mm lung nodule in the right upper lobe (series 301, image 13).   There is

## 2021-04-12 NOTE — PROGRESS NOTES
Nutrition Assessment    Type and Reason for Visit:  Initial, Positive Nutrition Screen(difficulty chewing/swallowing reported)    Nutrition Recommendations/Plan:   · Continue current diet, modification as per SLP  · Begin low angie high protein oral nutrition supplement daily, between meals as desired    Nutrition Assessment:  Pt admitted with acute metabolic encephalopathy, improved, and dysphagia. H/O esophageal strictures/dilatation procedures noted. Dysphagia diet ordered per SLP. Consuming 76% to all per Flowsheets. No recent wt loss noted, wt gain actually. No wounds. Will continue to follow as low nutrition risk for now. Malnutrition Assessment:  Malnutrition Status:  No malnutrition    Context:  Acute Illness       Wounds:  None       Current Nutrition Therapies:    DIET DYSPHAGIA MINCED AND MOIST    Anthropometric Measures:  · Height: 5' 4\" (162.6 cm)  · Current Body Weight: 178 lb 8 oz (81 kg)   · Admission Body Weight: 178 lb 8 oz (81 kg)    · Usual Body Weight: 168 lb (76.2 kg)(12/23/20)     · Ideal Body Weight: 130 lbs; % Ideal Body Weight 137.3 %   · BMI: 30.6  · BMI Categories: Obese Class 1 (BMI 30.0-34. 9)       Nutrition Diagnosis:   · Predicted inadequate energy intake related to swallowing difficulty as evidenced by swallow study results    Nutrition Interventions:   Food and/or Nutrient Delivery:  Continue Current Diet, Start Oral Nutrition Supplement  Nutrition Education/Counseling:  No recommendation at this time   Coordination of Nutrition Care:  Continue to monitor while inpatient, Speech Therapy, Swallow Evaluation    Goals:  Pt will consume greater than 75% of his meals and supplements       Nutrition Monitoring and Evaluation:   Behavioral-Environmental Outcomes:  None Identified   Food/Nutrient Intake Outcomes:  Food and Nutrient Intake, Supplement Intake  Physical Signs/Symptoms Outcomes:  Biochemical Data, Chewing or Swallowing, Meal Time Behavior, Weight     Discharge Planning: No discharge needs at this time     Electronically signed by Madai Lopes RD, LD on 4/12/21 at 1:07 PM EDT    Contact: [phone]

## 2021-04-12 NOTE — PLAN OF CARE
Nutrition Problem #1: Predicted inadequate energy intake  Intervention: Food and/or Nutrient Delivery: Continue Current Diet, Start Oral Nutrition Supplement  Nutritional Goals: Pt will consume greater than 75% of his meals and supplements

## 2021-04-12 NOTE — PROGRESS NOTES
GI NOTE:  Patient known to me- past history of benign duodenal stricture, esophageal ring, GERD and colon polyp. His last EGD was 12/2020 (ring dilated) and last colonoscopy 3/2015 (6 mm adenoma) --- admitted after possibly getting chicken and biscuits stuck in esophagus (versus anxiety and panic attack).  Has also been having trouble with his asthma  Abdomen : mildly distended upper abd with mild tenderness  Impression:    1) GERD with esophageal ring    2) history of benign duodenal stricture from prior PUD   3) non-advanced colon polyp 3/2015   4) anxiety      Plan:   1) as this may have been initiated by a food bolus impaction, suggest repeating EGD after off Eliquis for 1-2 days   2) probably does not need repeat colonoscopy as will be 84-86 when it is due      [id number] [facility] CAN DO Wednesday, IF OTHERWISE READY FOR DISCHARGE BEFORE Wednesday CAN DO AS OUTPATIENT

## 2021-04-12 NOTE — CARE COORDINATION
.Chart reviewed. The patient is to return home with significant other at discharge. The patient has a PCP and insurance and his girlfriend can drive him to doctor's appointments without issues. The patient can afford and take his medications as prescribed. The patient does not require HHC and is independent in ADL's. He states he has two walkers that he can usea as needed but his girlfriend Elder Merlos helps him a ton. The patient denies any other needs at this time.

## 2021-04-13 LAB
ANION GAP SERPL CALCULATED.3IONS-SCNC: 7 MMOL/L (ref 4–16)
BUN BLDV-MCNC: 16 MG/DL (ref 6–23)
CALCIUM SERPL-MCNC: 9.2 MG/DL (ref 8.3–10.6)
CHLORIDE BLD-SCNC: 103 MMOL/L (ref 99–110)
CO2: 29 MMOL/L (ref 21–32)
CREAT SERPL-MCNC: 0.9 MG/DL (ref 0.9–1.3)
GFR AFRICAN AMERICAN: >60 ML/MIN/1.73M2
GFR NON-AFRICAN AMERICAN: >60 ML/MIN/1.73M2
GLUCOSE BLD-MCNC: 89 MG/DL (ref 70–99)
HCT VFR BLD CALC: 43.9 % (ref 42–52)
HEMOGLOBIN: 14.5 GM/DL (ref 13.5–18)
MAGNESIUM: 2.1 MG/DL (ref 1.8–2.4)
MCH RBC QN AUTO: 30.5 PG (ref 27–31)
MCHC RBC AUTO-ENTMCNC: 33 % (ref 32–36)
MCV RBC AUTO: 92.2 FL (ref 78–100)
PDW BLD-RTO: 12.4 % (ref 11.7–14.9)
PHOSPHORUS: 3.5 MG/DL (ref 2.5–4.9)
PLATELET # BLD: 148 K/CU MM (ref 140–440)
PMV BLD AUTO: 9.5 FL (ref 7.5–11.1)
POTASSIUM SERPL-SCNC: 4.5 MMOL/L (ref 3.5–5.1)
RBC # BLD: 4.76 M/CU MM (ref 4.6–6.2)
SODIUM BLD-SCNC: 139 MMOL/L (ref 135–145)
TROPONIN T: <0.01 NG/ML
TROPONIN T: <0.01 NG/ML
WBC # BLD: 5.6 K/CU MM (ref 4–10.5)

## 2021-04-13 PROCEDURE — 36415 COLL VENOUS BLD VENIPUNCTURE: CPT

## 2021-04-13 PROCEDURE — 85027 COMPLETE CBC AUTOMATED: CPT

## 2021-04-13 PROCEDURE — 94640 AIRWAY INHALATION TREATMENT: CPT

## 2021-04-13 PROCEDURE — 84100 ASSAY OF PHOSPHORUS: CPT

## 2021-04-13 PROCEDURE — 6370000000 HC RX 637 (ALT 250 FOR IP): Performed by: NURSE PRACTITIONER

## 2021-04-13 PROCEDURE — G0378 HOSPITAL OBSERVATION PER HR: HCPCS

## 2021-04-13 PROCEDURE — 99224 PR SBSQ OBSERVATION CARE/DAY 15 MINUTES: CPT | Performed by: SPECIALIST

## 2021-04-13 PROCEDURE — 80048 BASIC METABOLIC PNL TOTAL CA: CPT

## 2021-04-13 PROCEDURE — 6370000000 HC RX 637 (ALT 250 FOR IP): Performed by: INTERNAL MEDICINE

## 2021-04-13 PROCEDURE — 84484 ASSAY OF TROPONIN QUANT: CPT

## 2021-04-13 PROCEDURE — 83735 ASSAY OF MAGNESIUM: CPT

## 2021-04-13 PROCEDURE — 2580000003 HC RX 258: Performed by: INTERNAL MEDICINE

## 2021-04-13 PROCEDURE — 94761 N-INVAS EAR/PLS OXIMETRY MLT: CPT

## 2021-04-13 RX ADMIN — ALPRAZOLAM 0.5 MG: 0.5 TABLET ORAL at 20:44

## 2021-04-13 RX ADMIN — METOPROLOL TARTRATE 12.5 MG: 25 TABLET, FILM COATED ORAL at 20:44

## 2021-04-13 RX ADMIN — PANTOPRAZOLE SODIUM 40 MG: 40 TABLET, DELAYED RELEASE ORAL at 05:56

## 2021-04-13 RX ADMIN — MONTELUKAST 10 MG: 10 TABLET, FILM COATED ORAL at 20:44

## 2021-04-13 RX ADMIN — BUDESONIDE AND FORMOTEROL FUMARATE DIHYDRATE 2 PUFF: 80; 4.5 AEROSOL RESPIRATORY (INHALATION) at 07:44

## 2021-04-13 RX ADMIN — DOCUSATE SODIUM 100 MG: 100 CAPSULE ORAL at 11:01

## 2021-04-13 RX ADMIN — ALBUTEROL SULFATE 2 PUFF: 90 AEROSOL, METERED RESPIRATORY (INHALATION) at 11:18

## 2021-04-13 RX ADMIN — ATORVASTATIN CALCIUM 40 MG: 40 TABLET, FILM COATED ORAL at 20:44

## 2021-04-13 RX ADMIN — ALBUTEROL SULFATE 2 PUFF: 90 AEROSOL, METERED RESPIRATORY (INHALATION) at 07:43

## 2021-04-13 RX ADMIN — PANTOPRAZOLE SODIUM 40 MG: 40 TABLET, DELAYED RELEASE ORAL at 17:01

## 2021-04-13 RX ADMIN — METOPROLOL TARTRATE 12.5 MG: 25 TABLET, FILM COATED ORAL at 11:00

## 2021-04-13 RX ADMIN — SODIUM CHLORIDE, PRESERVATIVE FREE 10 ML: 5 INJECTION INTRAVENOUS at 11:01

## 2021-04-13 RX ADMIN — ALBUTEROL SULFATE 2 PUFF: 90 AEROSOL, METERED RESPIRATORY (INHALATION) at 15:49

## 2021-04-13 RX ADMIN — SODIUM CHLORIDE, PRESERVATIVE FREE 10 ML: 5 INJECTION INTRAVENOUS at 20:44

## 2021-04-13 RX ADMIN — ASPIRIN 81 MG: 81 TABLET, COATED ORAL at 11:00

## 2021-04-13 RX ADMIN — LATANOPROST 1 DROP: 50 SOLUTION OPHTHALMIC at 20:50

## 2021-04-13 RX ADMIN — L-METHYLFOLATE-ALGAE-VIT B12-B6 CAP 3-90.314-2-35 MG 1 CAPSULE: 3-90.314-2-35 CAP at 11:01

## 2021-04-13 RX ADMIN — L-METHYLFOLATE-ALGAE-VIT B12-B6 CAP 3-90.314-2-35 MG 1 CAPSULE: 3-90.314-2-35 CAP at 20:46

## 2021-04-13 ASSESSMENT — PAIN SCALES - GENERAL
PAINLEVEL_OUTOF10: 0

## 2021-04-13 NOTE — PROGRESS NOTES
Late note entry:    Stable  Discussed hid dysphagia with patient and wife -- has had difficulty in mid-sternal region and cervical region  Abdomen soft, non-tender, non-distended  Plan:   1) EGD with dilation in am

## 2021-04-13 NOTE — PROGRESS NOTES
Hospitalist Progress Note         Admit Date: 4/9/2021    PCP: Frank Arango MD     Chief Complaint   Patient presents with    Altered Mental Status        Assessment and Plan:     -Dysphagia with prior history of esophageal strictures/dilatation procedures. Evaluated by GI-recommend EGD Wednesday. SLP recommended dysphagia diet  -Acute metabolic encephalopathy improved. CVA work-up unremarkable.  -Paroxysmal A. fib currently rate controlled on metoprolol. Eliquis on hold for EGD. -History CVA with left parietal lobe involvement on aspirin and statin.  -Chronic diastolic CHF Lasix on hold. Recent echo EF normal.  G1 DD.  -History of anxiety continue Xanax. -COPD continue INH. Stable    Case discussed with patient and his wife at bedside. Agree with current management. N.p.o. PMN for EGD tomorrow.     Current Facility-Administered Medications   Medication Dose Route Frequency Provider Last Rate Last Admin    albuterol sulfate  (90 Base) MCG/ACT inhaler 2 puff  2 puff Inhalation 4x daily Agueda Martinez MD   2 puff at 04/13/21 0743    pantoprazole (PROTONIX) tablet 40 mg  40 mg Oral BID AC Elzie Yudelka, APRN - CNP   40 mg at 04/13/21 0556    L-methylfolate-B6-B12 3-35-2 mg (METANX) capsule  1 capsule Oral BID Agueda Martinez MD   1 capsule at 04/12/21 2123    ALPRAZolam (XANAX) tablet 0.5 mg  0.5 mg Oral Nightly PRN Pee Montelongo APRN - CNP   0.5 mg at 04/12/21 2125    docusate sodium (COLACE) capsule 100 mg  100 mg Oral Daily Emmanuelle Mccarthy, APRN - CNP   100 mg at 04/12/21 1120    montelukast (SINGULAIR) tablet 10 mg  10 mg Oral Nightly Gilma Reynolds MD   10 mg at 04/12/21 2122    atorvastatin (LIPITOR) tablet 40 mg  40 mg Oral Nightly Gilma Reynolds MD   40 mg at 04/12/21 2123    aspirin EC tablet 81 mg  81 mg Oral Daily Gilma Reynolds MD   81 mg at 04/12/21 1120    latanoprost (XALATAN) 0.005 % ophthalmic solution 1 drop  1 drop Both Eyes Nightly Allyn Reynoso MD   1 drop at 04/12/21 2123    [Held by provider] apixaban (ELIQUIS) tablet 5 mg  5 mg Oral BID Allyn Reynoso MD   5 mg at 04/11/21 2315    nitroGLYCERIN (NITROSTAT) SL tablet 0.4 mg  0.4 mg Sublingual Q5 Min PRN Allyn Reynoso MD   0.4 mg at 04/09/21 1745    metoprolol tartrate (LOPRESSOR) tablet 12.5 mg  12.5 mg Oral BID Allyn Reynoso MD   12.5 mg at 04/12/21 2122    albuterol (PROVENTIL) nebulizer solution 2.5 mg  2.5 mg Nebulization PRN Allyn Reynoso MD   2.5 mg at 04/10/21 1902    budesonide-formoterol (SYMBICORT) 80-4.5 MCG/ACT inhaler 2 puff  2 puff Inhalation BID Allyn Reynoso MD   2 puff at 04/13/21 0744    sodium chloride flush 0.9 % injection 5-40 mL  5-40 mL Intravenous 2 times per day Allyn Reynoso MD   10 mL at 04/10/21 2228    sodium chloride flush 0.9 % injection 5-40 mL  5-40 mL Intravenous PRN Allyn Reynoso MD        0.9 % sodium chloride infusion  25 mL Intravenous PRN Allyn Reynoso MD        polyethylene glycol (GLYCOLAX) packet 17 g  17 g Oral Daily PRN Allyn Reynoso MD        acetaminophen (TYLENOL) tablet 650 mg  650 mg Oral Q6H PRN Allyn Reynoso MD        Or    acetaminophen (TYLENOL) suppository 650 mg  650 mg Rectal Q6H PRN Allyn Reynoso MD        promethazine (PHENERGAN) tablet 12.5 mg  12.5 mg Oral Q6H PRN Allyn Reynoso MD        Or    [facility] injection 4 mg  4 mg Intravenous Q6H PRN Allyn Reynoso MD        sodium chloride flush 0.9 % injection 5-40 mL  5-40 mL Intravenous 2 times per day Allyn Reynoso MD   10 mL at 04/12/21 2122    sodium chloride flush 0.9 % injection 5-40 mL  5-40 mL Intravenous PRN Allyn Reynoso MD        0.9 % sodium chloride infusion  25 mL Intravenous PRN Allyn Reynoso MD           Subjective:     Patient is sitting in chair and denied any new complaints. No significant overnight events reported. Objective:     No intake or output data in the 24 hours ending 04/13/21 0815   Vitals:   Vitals:    04/13/21 0747   BP:    Pulse:    Resp:    Temp:    SpO2: 98%     Physical Exam:  General Appearance:    Alert, cooperative, no distress  Head:      Normocephalic, without obvious abnormality, atraumatic  Eyes:       Conjunctiva/corneas clear, EOM's intact  Lungs:    Clear to auscultation bilaterally, respirations unlabored  Heart:                RRR, S1 and S2 normal, no M/R/G  Abdomen:     Soft, non-tender, bowel sounds active, no masses, no organomegaly  Extremities:   Extremities normal, atraumatic, no cyanosis or edema  Neurological:   Grossly Intact. Significant Diagnostic Studies:   DATA:    CBC   No results for input(s): WBC, HGB, HCT, PLT in the last 72 hours. BMP   No results for input(s): NA, K, CL, CO2, PHOS, BUN, CREATININE in the last 72 hours. Invalid input(s): CA  LFT'S   No results for input(s): AST, ALT, ALB, BILIDIR, BILITOT, ALKPHOS in the last 72 hours. COAG   No results for input(s): INR in the last 72 hours. POC:   Lab Results   Component Value Date    POCGLU 119 04/10/2021    POCGLU 151 04/09/2021    POCGLU 80 03/13/2019     [id number]:  Lab Results   Component Value Date    LABA1C 5.6 04/10/2021     CARDIAC ENZYMES  No results for input(s): CKTOTAL, CKMB, CKMBINDEX, TROPONINI in the last 72 hours. Troponin:   Recent Labs     04/12/21  1652 04/13/21  0042 04/13/21  0556   TROPONINT <0.010 <0.010 <0.010     BNP: No results for input(s): PROBNP in the last 72 hours.   U/A:    Lab Results   Component Value Date    COLORU STRAW 04/09/2021    WBCUA <1 04/09/2021    RBCUA 1 04/09/2021    MUCUS RARE 04/09/2021    BACTERIA NEGATIVE 04/09/2021    CLARITYU CLEAR 04/09/2021    SPECGRAV 1.021 04/09/2021    LEUKOCYTESUR NEGATIVE 04/09/2021    BLOODU NEGATIVE 04/09/2021       Ct Head Wo Contrast    Addendum Date: 4/9/2021    ADDENDUM: Results reported to Dr. Patel July at 11:22 a.m. on April 9, 2021. Result Date: 4/9/2021  EXAMINATION: CT OF THE HEAD WITHOUT CONTRAST  4/9/2021 11:03 am TECHNIQUE: CT of the head was performed without the administration of intravenous contrast. Dose modulation, iterative reconstruction, and/or weight based adjustment of the mA/kV was utilized to reduce the radiation dose to as low as reasonably achievable. COMPARISON: CT head March 13, 2019 HISTORY: ORDERING SYSTEM PROVIDED HISTORY: AMS TECHNOLOGIST PROVIDED HISTORY: Reason for exam:->AMS Has a \"code stroke\" or \"stroke alert\" been called? ->Yes Decision Support Exception->Emergency Medical Condition (MA) Reason for Exam: STROKE ALERT;AMS Acuity: Acute Type of Exam: Initial FINDINGS: BRAIN/VENTRICLES: There is mild parenchymal volume loss. There is periventricular white matter low attenuation, likely related to mild chronic microvascular disease. There is no acute intracranial hemorrhage, mass effect or midline shift. No abnormal extra-axial fluid collection. There is a small old infarction in the left parietal lobe, stable. No evidence of acute territorial infarction. There is no hydrocephalus. ORBITS: The visualized portion of the orbits demonstrate no acute abnormality. SINUSES: The visualized paranasal sinuses and mastoid air cells demonstrate no acute abnormality. SOFT TISSUES/SKULL:  No acute abnormality of the visualized skull or soft tissues. No acute intracranial abnormality. Small old infarction in the left parietal lobe, stable. No evidence of acute territorial infarction. Mild parenchymal volume loss. Mild chronic microvascular disease.      Xr Chest Portable    Result Date: 4/9/2021  EXAMINATION: ONE XRAY VIEW OF THE CHEST 4/9/2021 11:55 am COMPARISON: December 23, 2020 HISTORY: ORDERING SYSTEM PROVIDED HISTORY: AMS TECHNOLOGIST PROVIDED HISTORY: Reason for exam:->AMS Reason for Exam: AMS Acuity: Acute Type of Exam: Initial FINDINGS: The or mass. Minimal chronic white matter microvascular ischemic changes and small focus of left parietal encephalomalacia are redemonstrated. No mass effect or midline shift. No evidence of an acute intracranial hemorrhage. The ventricles and sulci are normal in size and configuration. The sellar/suprasellar regions appear unremarkable. The normal signal voids within the major intracranial vessels appear maintained. ORBITS: Bilateral intra-ocular lens implants. Otherwise, normal. SINUSES: The visualized paranasal sinuses and mastoid air cells are well aerated. BONES/SOFT TISSUES: The bone marrow signal intensity appears normal. The soft tissues demonstrate no acute abnormality. 1. No acute intracranial abnormality. 2. Minimal chronic white matter microvascular ischemic changes. 3. Stable small focus of left parietal encephalomalacia in keeping with sequela of prior infarct.            Irene Hargrove  [facility] Medical Centerist

## 2021-04-13 NOTE — PLAN OF CARE
Problem: Pain:  Goal: Pain level will decrease  Description: Pain level will decrease  Outcome: Ongoing  Goal: Control of acute pain  Description: Control of acute pain  Outcome: Ongoing  Goal: Control of chronic pain  Description: Control of chronic pain  Outcome: Ongoing     Problem: Skin Integrity:  Goal: Will show no infection signs and symptoms  Description: Will show no infection signs and symptoms  Outcome: Ongoing  Goal: Absence of new skin breakdown  Description: Absence of new skin breakdown  Outcome: Ongoing     Problem: Falls - Risk of:  Goal: Will remain free from falls  Description: Will remain free from falls  Outcome: Ongoing  Goal: Absence of physical injury  Description: Absence of physical injury  Outcome: Ongoing     Problem: Nutrition  Goal: Optimal nutrition therapy  Outcome: Ongoing     Problem: Health Behavior:  Goal: Identification of resources available to assist in meeting health care needs will improve  Description: Identification of resources available to assist in meeting health care needs will improve  Outcome: Ongoing     Problem: Safety:  Goal: Ability to chew and swallow food without choking will improve  Description: Ability to chew and swallow food without choking will improve  Outcome: Ongoing     Problem: KNOWLEDGE DEFICIT  Goal: Patient/S.O. demonstrates understanding of disease process, treatment plan, medications, and discharge instructions.   Outcome: Ongoing     Problem: DISCHARGE BARRIERS  Goal: Patient's continuum of care needs are met  Outcome: Ongoing

## 2021-04-14 ENCOUNTER — ANESTHESIA (OUTPATIENT)
Dept: ENDOSCOPY | Age: 81
End: 2021-04-14
Payer: MEDICARE

## 2021-04-14 ENCOUNTER — ANESTHESIA EVENT (OUTPATIENT)
Dept: ENDOSCOPY | Age: 81
End: 2021-04-14
Payer: MEDICARE

## 2021-04-14 VITALS
TEMPERATURE: 97.7 F | RESPIRATION RATE: 16 BRPM | WEIGHT: 168.25 LBS | BODY MASS INDEX: 28.73 KG/M2 | DIASTOLIC BLOOD PRESSURE: 70 MMHG | OXYGEN SATURATION: 94 % | HEIGHT: 64 IN | HEART RATE: 61 BPM | SYSTOLIC BLOOD PRESSURE: 121 MMHG

## 2021-04-14 VITALS
OXYGEN SATURATION: 100 % | SYSTOLIC BLOOD PRESSURE: 112 MMHG | RESPIRATION RATE: 20 BRPM | DIASTOLIC BLOOD PRESSURE: 58 MMHG

## 2021-04-14 LAB
CULTURE: NORMAL
CULTURE: NORMAL
Lab: NORMAL
Lab: NORMAL
SPECIMEN: NORMAL
SPECIMEN: NORMAL

## 2021-04-14 PROCEDURE — 2720000010 HC SURG SUPPLY STERILE: Performed by: SPECIALIST

## 2021-04-14 PROCEDURE — 43249 ESOPH EGD DILATION <30 MM: CPT | Performed by: SPECIALIST

## 2021-04-14 PROCEDURE — G0378 HOSPITAL OBSERVATION PER HR: HCPCS

## 2021-04-14 PROCEDURE — C1726 CATH, BAL DIL, NON-VASCULAR: HCPCS | Performed by: SPECIALIST

## 2021-04-14 PROCEDURE — 2580000003 HC RX 258: Performed by: INTERNAL MEDICINE

## 2021-04-14 PROCEDURE — 3609017700 HC EGD DILATION GASTRIC/DUODENAL STRICTURE: Performed by: SPECIALIST

## 2021-04-14 PROCEDURE — 6360000002 HC RX W HCPCS: Performed by: NURSE ANESTHETIST, CERTIFIED REGISTERED

## 2021-04-14 PROCEDURE — 3700000001 HC ADD 15 MINUTES (ANESTHESIA): Performed by: SPECIALIST

## 2021-04-14 PROCEDURE — 6370000000 HC RX 637 (ALT 250 FOR IP): Performed by: SPECIALIST

## 2021-04-14 PROCEDURE — 94640 AIRWAY INHALATION TREATMENT: CPT

## 2021-04-14 PROCEDURE — 2500000003 HC RX 250 WO HCPCS: Performed by: NURSE ANESTHETIST, CERTIFIED REGISTERED

## 2021-04-14 PROCEDURE — 2580000003 HC RX 258: Performed by: SPECIALIST

## 2021-04-14 PROCEDURE — 3700000000 HC ANESTHESIA ATTENDED CARE: Performed by: SPECIALIST

## 2021-04-14 PROCEDURE — 2709999900 HC NON-CHARGEABLE SUPPLY: Performed by: SPECIALIST

## 2021-04-14 RX ORDER — PSEUDOEPHEDRINE HCL 30 MG
100 TABLET ORAL DAILY
Qty: 60 CAPSULE | Refills: 1 | Status: SHIPPED | OUTPATIENT
Start: 2021-04-14

## 2021-04-14 RX ORDER — POLYETHYLENE GLYCOL 3350 17 G/17G
17 POWDER, FOR SOLUTION ORAL DAILY PRN
Qty: 30 EACH | Refills: 0 | Status: SHIPPED | OUTPATIENT
Start: 2021-04-14 | End: 2021-05-14

## 2021-04-14 RX ORDER — LIDOCAINE HYDROCHLORIDE 20 MG/ML
INJECTION, SOLUTION EPIDURAL; INFILTRATION; INTRACAUDAL; PERINEURAL PRN
Status: DISCONTINUED | OUTPATIENT
Start: 2021-04-14 | End: 2021-04-14 | Stop reason: SDUPTHER

## 2021-04-14 RX ORDER — PANTOPRAZOLE SODIUM 40 MG/1
40 TABLET, DELAYED RELEASE ORAL 2 TIMES DAILY
Qty: 60 TABLET | Refills: 3
Start: 2021-04-14

## 2021-04-14 RX ORDER — PROPOFOL 10 MG/ML
INJECTION, EMULSION INTRAVENOUS PRN
Status: DISCONTINUED | OUTPATIENT
Start: 2021-04-14 | End: 2021-04-14 | Stop reason: SDUPTHER

## 2021-04-14 RX ADMIN — L-METHYLFOLATE-ALGAE-VIT B12-B6 CAP 3-90.314-2-35 MG 1 CAPSULE: 3-90.314-2-35 CAP at 09:14

## 2021-04-14 RX ADMIN — PROPOFOL 10 MG: 10 INJECTION, EMULSION INTRAVENOUS at 07:14

## 2021-04-14 RX ADMIN — METOPROLOL TARTRATE 12.5 MG: 25 TABLET, FILM COATED ORAL at 09:14

## 2021-04-14 RX ADMIN — ASPIRIN 81 MG: 81 TABLET, COATED ORAL at 09:14

## 2021-04-14 RX ADMIN — LIDOCAINE HYDROCHLORIDE 50 MG: 20 INJECTION, SOLUTION EPIDURAL; INFILTRATION; INTRACAUDAL; PERINEURAL at 07:04

## 2021-04-14 RX ADMIN — SODIUM CHLORIDE, PRESERVATIVE FREE 10 ML: 5 INJECTION INTRAVENOUS at 09:15

## 2021-04-14 RX ADMIN — PROPOFOL 10 MG: 10 INJECTION, EMULSION INTRAVENOUS at 07:08

## 2021-04-14 RX ADMIN — LIDOCAINE HYDROCHLORIDE 50 MG: 20 INJECTION, SOLUTION EPIDURAL; INFILTRATION; INTRACAUDAL; PERINEURAL at 07:05

## 2021-04-14 RX ADMIN — PROPOFOL 50 MG: 10 INJECTION, EMULSION INTRAVENOUS at 07:04

## 2021-04-14 RX ADMIN — ALBUTEROL SULFATE 2 PUFF: 90 AEROSOL, METERED RESPIRATORY (INHALATION) at 11:56

## 2021-04-14 RX ADMIN — DOCUSATE SODIUM 100 MG: 100 CAPSULE ORAL at 09:14

## 2021-04-14 RX ADMIN — PROPOFOL 10 MG: 10 INJECTION, EMULSION INTRAVENOUS at 07:20

## 2021-04-14 RX ADMIN — SODIUM CHLORIDE: 9 INJECTION, SOLUTION INTRAVENOUS at 06:57

## 2021-04-14 ASSESSMENT — COPD QUESTIONNAIRES: CAT_SEVERITY: MILD

## 2021-04-14 ASSESSMENT — PAIN SCALES - GENERAL: PAINLEVEL_OUTOF10: 0

## 2021-04-14 ASSESSMENT — ENCOUNTER SYMPTOMS: SHORTNESS OF BREATH: 1

## 2021-04-14 NOTE — PROGRESS NOTES
REPORT CALLED TO [facility] CERVANTES. PT AWAKE AND RESPONSIVE. VS STABLE. MAY HAVE REGULAR DIET AND START ELIQUIS TOMORROW PER DR Prerna Chinchilla. PT DENIES C/O OR NEEDS.

## 2021-04-14 NOTE — DISCHARGE SUMMARY
Steve Head 1940 [id number]  PCP:  Jade Vail MD    Admit date: 4/9/2021  Admitting Physician: Mulu Vines MD    Discharge date: 4/14/2021 Discharge Physician: Cherelle Rene MD         Discharge Diagnoses. As per below    Hospital Course:  History of present illness at admission: As per H&P  Subsequent Hospital Course:     -Dysphagia with prior history of esophageal strictures/dilatation procedures. Evaluated by Dr. Gabriel Ludwig. SLP recommended dysphagia diet  EGD findings-hiatal hernia noted along with esophageal ring at [facility] junction  Balloon dilation with 18-20 balloon and dilation with [place]. Benign-appearing stricture in the descending colon-unchanged from previous  And permitted passage of standard gastroscope. Otherwise normal EGD. -Acute metabolic encephalopathy improved. CVA work-up unremarkable.  -Paroxysmal A. fib currently rate controlled on metoprolol. Advised to restart Eliquis tomorrow.  -History CVA with left parietal lobe involvement on aspirin and statin.  -Chronic diastolic CHF Lasix on hold. Recent echo EF normal.  G1 DD.  -History of anxiety continue Xanax. -COPD continue INH. Stable    On the day of discharge, pt felt better. No new complaints.     Pertinent Exam Findings on Day of Discharge:  General Appearance:    Alert, cooperative, no distress, appears stated age  Head:    Normocephalic, without obvious abnormality, atraumatic  Eyes:    PERRL, conjunctiva/corneas clear, EOM's intact  Lungs:    Clear to auscultation bilaterally, respirations unlabored   Heart:    Regular rate and rhythm, S1 and S2 normal, no murmur,   rub or gallop  Abdomen:     Soft, non-tender, bowel sounds active, no masses, no organomegaly  Extremities:   Extremities normal, atraumatic, no cyanosis or edema    Consults:  IP CONSULT TO HOSPITALIST  IP CONSULT TO HOSPITALIST  IP CONSULT TO NEUROLOGY  IP CONSULT TO CARDIOLOGY  IP CONSULT TO GI    Patient Instructions:   Ty Sanchez Home Medication Instructions [facility]:[id number]    Printed on:04/14/21 0617   Medication Information                      acetaminophen 650 MG TABS  Take 650 mg by mouth every 4 hours as needed             albuterol (PROVENTIL) (2.5 MG/3ML) 0.083% nebulizer solution  Take 3 mLs by nebulization every 6 hours as needed for Wheezing             albuterol sulfate (PROAIR RESPICLICK) 055 (90 Base) MCG/ACT aerosol powder inhalation  Inhale 2 puffs into the lungs as needed for Wheezing or Shortness of Breath             ALPRAZolam (XANAX) 0.25 MG tablet  Take 0.5 mg by mouth nightly as needed for Sleep.              apixaban (ELIQUIS) 2.5 MG TABS tablet  Take 2 tablets by mouth 2 times daily             aspirin 81 MG tablet  Take 81 mg by mouth daily             atorvastatin (LIPITOR) 40 MG tablet  Take 40 mg by mouth nightly              docusate sodium (COLACE, DULCOLAX) 100 MG CAPS  Take 100 mg by mouth daily             fexofenadine (HM FEXOFENADINE HCL) 180 MG tablet  Take 180 mg by mouth daily             fluticasone-vilanterol (BREO ELLIPTA) 100-25 MCG/INH AEPB inhaler  Inhale 1 puff into the lungs daily After inhalation then gargle             fluticasone-vilanterol (BREO ELLIPTA) 100-25 MCG/INH AEPB inhaler  Inhale 1 puff into the lungs daily After inhalation then gargle             furosemide (LASIX) 40 MG tablet  Take 40 mg by mouth as needed             L-methylfolate-B6-B12 (METANX) 6-58.766-5-98 MG CAPS capsule  Take 1 capsule by mouth 2 times daily             latanoprost (XALATAN) 0.005 % ophthalmic solution  Place 1 drop into both eyes nightly             Magnesium 400 MG CAPS  Take by mouth             metoprolol tartrate (LOPRESSOR) 12.5 mg TABS  Take 12.5 mg by mouth 2 times daily             montelukast (SINGULAIR) 10 MG tablet  Take 10 mg by mouth nightly.              nitroGLYCERIN (NITROSTAT) 0.4 MG SL tablet  Place 0.4 mg under the tongue every 5 minutes as needed for Chest pain up to max of 3 total doses. If no relief after 1 dose, call 911. pantoprazole (PROTONIX) 40 MG tablet  Take 1 tablet by mouth 2 times daily On med list of Dr Chaitanya Meeks dated 2/13/2014             polyethylene glycol (GLYCOLAX) 17 g packet  Take 17 g by mouth daily as needed for Constipation                   Diet:  DIET GENERAL;     Activity:   activity as tolerated     Discharge Condition:   good    Disposition:   home    Follow-up    Lashon Champagne MD  Call  If symptoms worsen  [address] [place] [address],  [address]   Landry Evans MD  Go on 4/27/2021  Follow up appt @ 9:00 AM  [address] [place] [id number] [phone]        Discharge Physician Signed: Dennise Gross

## 2021-04-14 NOTE — ANESTHESIA PRE PROCEDURE
Department of Anesthesiology  Preprocedure Note       Name:  Sara Rico   Age:  78 y.o.  :  1940                                          MRN:  [id number]         Date:  2021      Surgeon: Sean Birch):  Scarlet Lacey MD    Procedure: Procedure(s):  EGD DILATION BALLOON    Medications prior to admission:   Prior to Admission medications    Medication Sig Start Date End Date Taking? Authorizing Provider   predniSONE (DELTASONE) 5 MG tablet Take 6 PO now, 5 PO qam for 2 days, 3 PO qam for 3 days, 2 PO qam for 4 days, 1 PO qam for 5 days, then STOP 21   Shyam Drew MD   fluticasone-vilanterol (BREO ELLIPTA) 100-25 MCG/INH AEPB inhaler Inhale 1 puff into the lungs daily After inhalation then gargle 3/11/21 6/9/21  hSyam Drew MD   albuterol sulfate (PROAIR RESPICLICK) 169 (90 Base) MCG/ACT aerosol powder inhalation Inhale 2 puffs into the lungs as needed for Wheezing or Shortness of Breath 21   Shyam Drew MD   fluticasone-vilanterol (BREO ELLIPTA) 100-25 MCG/INH AEPB inhaler Inhale 1 puff into the lungs daily After inhalation then gargle 21   Shyam Drew MD   famotidine (PEPCID) 40 MG tablet Take 40 mg by mouth daily    Historical Provider, MD   L-methylfolate-B6-B12 Pauleen Mcarthur) [phone] MG CAPS capsule Take 1 capsule by mouth 2 times daily    Historical Provider, MD   Magnesium 400 MG CAPS Take by mouth    Historical Provider, MD   guaiFENesin (BREONESIN PO) Take by mouth    Historical Provider, MD   nitroGLYCERIN (NITROSTAT) 0.4 MG SL tablet Place 0.4 mg under the tongue every 5 minutes as needed for Chest pain up to max of 3 total doses. If no relief after 1 dose, call 911.     Historical Provider, MD   metoprolol tartrate (LOPRESSOR) 12.5 mg TABS Take 12.5 mg by mouth 2 times daily    Historical Provider, MD   furosemide (LASIX) 40 MG tablet Take 40 mg by mouth as needed    Historical Provider, MD   ALPRAZolam (XANAX) 0.25 MG tablet Take 0.5 mg by mouth nightly as needed for Sleep. Historical Provider, MD   albuterol (PROVENTIL) (2.5 MG/3ML) 0.083% nebulizer solution Take 3 mLs by nebulization every 6 hours as needed for Wheezing 4/1/19   Mariajose Lay MD   predniSONE (DELTASONE) 5 MG tablet Take 5 PO now, 4 PO qam for 2 days, 2 PO qam for 2 days, 1 PO qam for 3 days, then STOP. Patient not taking: Reported on 12/14/2020 4/1/19   Mariajose Lay MD   apixaban Julio Balding) 2.5 MG TABS tablet Take 2 tablets by mouth 2 times daily 3/5/19   Jaren Berry MD   latanoprost (XALATAN) 0.005 % ophthalmic solution Place 1 drop into both eyes nightly 11/15/18   Historical Provider, MD   atorvastatin (LIPITOR) 40 MG tablet Take 40 mg by mouth nightly  2/7/17   Historical Provider, MD   aspirin 81 MG tablet Take 81 mg by mouth daily    Historical Provider, MD   fexofenadine (HM FEXOFENADINE HCL) 180 MG tablet Take 180 mg by mouth daily    Historical Provider, MD   acetaminophen 650 MG TABS Take 650 mg by mouth every 4 hours as needed 7/26/15   Cleopatra Kanner, DO   montelukast (SINGULAIR) 10 MG tablet Take 10 mg by mouth nightly. Historical Provider, MD   pantoprazole (PROTONIX) 40 MG tablet Take 40 mg by mouth daily.  On med list of Dr Jovanni Allred dated 2/13/2014    Historical Provider, MD       Current medications:    Current Facility-Administered Medications   Medication Dose Route Frequency Provider Last Rate Last Admin    albuterol sulfate  (90 Base) MCG/ACT inhaler 2 puff  2 puff Inhalation 4x daily Jorge Costello MD   2 puff at 04/13/21 1549    pantoprazole (PROTONIX) tablet 40 mg  40 mg Oral BID AC IFTIKHAR Diaz CNP   40 mg at 04/13/21 1701    L-methylfolate-B6-B12 3-35-2 mg (METANX) capsule  1 capsule Oral BID Jorge Costello MD   1 capsule at 04/13/21 2046    ALPRAZolam (XANAX) tablet 0.5 mg  0.5 mg Oral Nightly PRN IFTIKHAR Soria CNP   0.5 mg at 04/13/21 2044    docusate sodium (COLACE) capsule 100 mg  100 mg Oral Daily IFTIKHAR Combs - CNP 100 mg at 04/13/21 1101    montelukast (SINGULAIR) tablet 10 mg  10 mg Oral Nightly Gearline Osgood, MD   10 mg at 04/13/21 2044    atorvastatin (LIPITOR) tablet 40 mg  40 mg Oral Nightly Gearline Osgood, MD   40 mg at 04/13/21 2044    aspirin EC tablet 81 mg  81 mg Oral Daily Gearline Osgood, MD   81 mg at 04/13/21 1100    latanoprost (XALATAN) 0.005 % ophthalmic solution 1 drop  1 drop Both Eyes Nightly Gearline Osgood, MD   1 drop at 04/13/21 2050    [Held by provider] apixaban (ELIQUIS) tablet 5 mg  5 mg Oral BID Gearline Osgood, MD   5 mg at 04/11/21 2315    nitroGLYCERIN (NITROSTAT) SL tablet 0.4 mg  0.4 mg Sublingual Q5 Min PRN Gearline Osgood, MD   0.4 mg at 04/09/21 1745    metoprolol tartrate (LOPRESSOR) tablet 12.5 mg  12.5 mg Oral BID Gearline Osgood, MD   12.5 mg at 04/13/21 2044    albuterol (PROVENTIL) nebulizer solution 2.5 mg  2.5 mg Nebulization PRN Gearline Osgood, MD   2.5 mg at 04/10/21 1902    budesonide-formoterol (SYMBICORT) 80-4.5 MCG/ACT inhaler 2 puff  2 puff Inhalation BID Gearline Osgood, MD   2 puff at 04/13/21 0744    sodium chloride flush 0.9 % injection 5-40 mL  5-40 mL Intravenous 2 times per day Gearline Osgood, MD   10 mL at 04/10/21 2228    sodium chloride flush 0.9 % injection 5-40 mL  5-40 mL Intravenous PRN Gearline Osgood, MD        0.9 % sodium chloride infusion  25 mL Intravenous PRN Gearline Osgood, MD        polyethylene glycol (GLYCOLAX) packet 17 g  17 g Oral Daily PRN Gearline Osgood, MD        acetaminophen (TYLENOL) tablet 650 mg  650 mg Oral Q6H PRN Gearline Osgood, MD        Or    acetaminophen (TYLENOL) suppository 650 mg  650 mg Rectal Q6H PRN Gearline Osgood, MD        promethazine (PHENERGAN) tablet 12.5 mg  12.5 mg Oral Q6H PRN Gearline Osgood, MD        Or    ondansetron [facility]) injection 4 mg  4 mg Intravenous Q6H PRN Kadeem Lam MD        sodium chloride flush 0.9 % injection 5-40 mL  5-40 mL Intravenous 2 times per day Kademe Lam MD   10 mL at 04/13/21 2044    sodium chloride flush 0.9 % injection 5-40 mL  5-40 mL Intravenous PRN Kadeem Lam MD        0.9 % sodium chloride infusion  25 mL Intravenous PRN Kadeem Lam MD           Allergies: Allergies   Allergen Reactions    Aminophylline     Contrast [Iodides]      Patient states had IV contrast with negative results     Other      slobid    Pentazocine Lactate [Pentazocine]     Prednisone      Shakes, nausea    Theophyllines        Problem List:    Patient Active Problem List   Diagnosis Code    Pneumonia J18.9    Hypertension I10    Hyperlipemia E78.5    Chest pain on exertion R07.9    SOB (shortness of breath) R06.02    General weakness R53.1    PFO (patent foramen ovale) Q21.1    History of 24 hour EKG monitoring K75.04    Diastolic CHF (HCC) Y87.86    Stable angina (HCC) I20.8    Edema R60.9    Groin discomfort R10.30    Hx of Doppler ultrasound Z92.89    Hx of Doppler ultrasound Z92.89    Ileus (HCC) K56.7    Mild persistent asthma without complication I62.44    Acute bronchospasm J98.01    COPD, mild (HCC) J44.9    Asthma exacerbation J45. 0    Hiatal hernia K44.9    Pancreatitis, unspecified pancreatitis type K85.90    Acute exacerbation of chronic obstructive pulmonary disease (COPD) (HCC) J44.1    Lung nodule seen on imaging study R91.1    TIA (transient ischemic attack) G45.9    Seizure ([facility] R56.9    Altered mental status R41.82    Thoracic aortic aneurysm (HCC) I71.2    Encephalopathy acute G93.40       Past Medical History:        Diagnosis Date    A-fib ([facility] 3/31/14    Initial consult    Acute bronchospasm 9/20/2016    Acute exacerbation of chronic obstructive pulmonary disease (COPD) ([facility] 1/16/2018    Asthma     Atrial fibrillation ([facility]     Blood glucose elevated 12/17/2013    QV=539    Cancer (HCC) dx 2003    prostrate- tx surg only/ tx for skin cancer of head now    Chest pain 3/14    COPD, mild ([address] 4/52/1213    Diastolic CHF (HCC)     Edema     Family history of diabetes mellitus     Brother    Fatigue 3/14    Glaucoma     \"watching this now\" Dr Bethann Burkitt H/O cardiovascular stress test 4/14/14 5/14EF>55% Atrial septal occluder device noted. 4/14No evidence of hemodynamically significant coronary artery disease    H/O cardiovascular stress test 1/14/2016    lexiscan-normal,EF70%    H/O echocardiogram 02/12/15    EF 60% Mildly hypertrophied LV and LV systolic function. Mild MR & TR. Atrial septal occluder device is seen with is fuctioning normally.  H/O echocardiogram 1/14/2016    EF 60% aaortic root mildly dilated with dimension of 4.3    H/O transesophageal echocardiography (HARLAN) for monitoring 5/22/2014    normal septal occluder     Hiatal hernia     planning to see Dr Russell Rodas about this    History of 24 hour EKG monitoring 5/10/14    Sinus rhythm.  History of cardiovascular stress test 12/16/2013 12/2013-([facility])-Probably normal Regadenoson/Exercise with Tc-99 sestamibi. No ischemia noted. Small fixed defect in anteroseptuma and a second small fixed defect in inferolateral wall, most consistent with attenuation artifact. Normal LV size. Global LVSF normal and normal wall motion;    History of echocardiogram 11/11/2013 11/2013-([facility])-Interatrial septal occluder device is visualized. Normal biventricular systolic function; LVEF - 60-65%. Mild degenerative valvular changes; Mild AI, Mild MR, trace TR, trace PI. No pericardial effusion;    History of heart surgery 11/13    PFO closure by Amplatzer 25mm Cribriform device to intra-atrial septum--> [employer])    History of kidney stones     tx with surgery for this in the past    History of nuclear stress test 2/16/15    EF 70%.  WNL  Hx of Doppler ultrasound 5/7/2014    Carotid- right and left normal    Hx of Doppler ultrasound 6/30/14    Right groin US: Negative for pseudoaneurysm findings.  Hx of echocardiogram 3/31/2014    mild mitral and tricusid regurg, mildly hypertrophied left ventricle    Hx of motion sickness     and claustrophobia    HX OTHER MEDICAL 2/18/2014-2/24/2014 2/2014-([facility])- 7 day Event Monitor- Five events were available for review. these were all symptom prompted events. Predominant rhythm shows normal sinus rhythm. No significant arrhythmias recorded. -Dr Bailey Portage     \" I have to move at slow pace\"get sob with exertion    Hyperlipidemia     Hypertension     follows with Dr Black Stabecca Hypoglycemia     \"dx with this years ago\"    Lung nodule seen on imaging study 1/16/2018    Mild persistent asthma without complication 5/56/4622    Paroxysmal atrial fibrillation (HCC)     symptomatic    Prolonged emergence from general anesthesia     PVD (peripheral vascular disease) ([address]     SOB (shortness of breath) 3/14    Stable angina (HCC)     Syncope and collapse     Thoracic aortic aneurysm ([address]     4.5cm    Unspecified cerebral artery occlusion with cerebral infarction     multiple CVA's; weakness in right arm; has recurrent TIA's       Past Surgical History:        Procedure Laterality Date    CARDIAC SURGERY  11/11/2013 11/2013-PFO closure by an Amplatzer 25mm Cribriform device in the intra-atrial septum- [id number]  03/2019    one stint placement    CHOLECYSTECTOMY  2001    lap choley-per old chart done [id number]  2006    CYSTOSCOPY  1990's    with stone manipulation    ENDOSCOPY, COLON, DIAGNOSTIC  7/24/15    duodenal stricture,fundic gland polyp, dilation 10-15mm, hiatal hernia    ENDOSCOPY, COLON, DIAGNOSTIC  04/26/2017    mild hiatal hernia, duodenal obstruction, possible duodenal trauma r/t dilation, clips placed, Date of last liquid consumption: 04/13/21                        Date of last solid food consumption: 04/13/21    BMI:   Wt Readings from Last 3 Encounters:   04/14/21 168 lb 4 oz (76.3 kg)   02/18/21 168 lb (76.2 kg)   12/23/20 168 lb (76.2 kg)     Body mass index is 28.88 kg/m². CBC:   Lab Results   Component Value Date    WBC 5.6 04/13/2021    RBC 4.76 04/13/2021    HGB 14.5 04/13/2021    HCT 43.9 04/13/2021    MCV 92.2 04/13/2021    RDW 12.4 04/13/2021     04/13/2021       CMP:   Lab Results   Component Value Date     04/13/2021    K 4.5 04/13/2021     04/13/2021    CO2 29 04/13/2021    BUN 16 04/13/2021    CREATININE 0.9 04/13/2021    GFRAA >60 04/13/2021    LABGLOM >60 04/13/2021    GLUCOSE 89 04/13/2021    PROT 6.6 04/09/2021    PROT 6.2 12/11/2012    CALCIUM 9.2 04/13/2021    BILITOT 1.3 04/09/2021    ALKPHOS 74 04/09/2021    AST 39 04/09/2021    ALT 28 04/09/2021       POC Tests: No results for input(s): POCGLU, POCNA, POCK, POCCL, POCBUN, POCHEMO, POCHCT in the last 72 hours. Coags:   Lab Results   Component Value Date    PROTIME 14.2 04/09/2021    INR 1.17 04/09/2021    APTT 22.3 07/30/2019       HCG (If Applicable): No results found for: PREGTESTUR, PREGSERUM, HCG, HCGQUANT     ABGs: No results found for: PHART, PO2ART, OAJ5YRC, HUV1FVE, BEART, E7BGPGZI     Type & Screen (If Applicable):  No results found for: LABABO, LABRH    Drug/Infectious Status (If Applicable):  Lab Results   Component Value Date    HEPCAB NON REACTIVE 11/18/2017       COVID-19 Screening (If Applicable):   Lab Results   Component Value Date    COVID19 NOT DETECTED 04/09/2021    COVID19 NOT DETECTED 12/18/2020           Anesthesia Evaluation   history of anesthetic complications (prolonged emergence):    Airway: Mallampati: II        Dental:          Pulmonary:   (+) pneumonia:  COPD: mild,  shortness of breath:  asthma:                            Cardiovascular:  Exercise tolerance: poor (<4 METS), (+) hypertension:, valvular problems/murmurs: MR, angina:, dysrhythmias: atrial fibrillation, CHF: diastolic, hyperlipidemia         Beta Blocker:  Dose within 24 Hrs      ROS comment: 11/2013-([facility])-Interatrial septal occluder device is visualized. Normal biventricular systolic function; LVEF - 60-65%. Mild degenerative valvular changes; Mild AI, Mild MR, trace TR, trace PI. No pericardial effusion;    PFO closure by Amplatzer 25mm Cribriform device to intra-atrial septum--> [employer])     Neuro/Psych:   (+) seizures:, CVA:, TIA,             GI/Hepatic/Renal:   (+) hiatal hernia, GERD:, renal disease: kidney stones,           Endo/Other:    (+) blood dyscrasia: anticoagulation therapy:., electrolyte abnormalities, malignancy/cancer. Abdominal:           Vascular:                                        Anesthesia Plan      general and MAC     ASA 4       Induction: intravenous. Anesthetic plan and risks discussed with patient. Pre Anesthesia Evaluation complete. Anesthesia plan, risks, benefits, alternatives, and personnel discussed with patient and/or legal guardian. Patient and/or legal guardian verbalized an understanding and agreed to proceed. Anesthesia plan discussed with care team members and agreed upon.   IFTIKHAR Olivares CRNA  4/14/2021        IFTIKHAR Olivares CRNA   4/14/2021

## 2021-04-14 NOTE — PROGRESS NOTES
REPORT RECEIVED FROM REGGIE CERVANTES IN [facility]. PREOP QUESTIONS, NPO STATUS, ALLERGIES VERIFIED WITH PT. NATHANIEL LAST ON 4/11/21, LOPRESSOR LAST 4/13/21 @  2050.

## 2021-04-14 NOTE — ANESTHESIA POSTPROCEDURE EVALUATION
Department of Anesthesiology  Postprocedure Note    Patient: Sam Koenig  MRN: [id number]  YOB: 1940  Date of evaluation: 4/14/2021  Time:  7:33 AM     Procedure Summary     Date: 04/14/21 Room / Location: [address]    Anesthesia Start: 4543 Anesthesia Stop: 4049    Procedure: EGD DILATION BALLOON WITH 18-20 BALLOON AND DILITATION WITH 18 SAVORY (N/A ) Diagnosis: (DYSPHAGIA)    Surgeons: Ever Astudillo MD Responsible Provider: Hardeep Obregon MD    Anesthesia Type: general, MAC ASA Status: 4          Anesthesia Type: general, MAC    Anu Phase I:      Anu Phase II:      Last vitals: Reviewed and per EMR flowsheets.        Anesthesia Post Evaluation    Patient location during evaluation: bedside  Patient participation: complete - patient participated  Level of consciousness: sleepy but conscious  Pain score: 0  Airway patency: patent  Nausea & Vomiting: no nausea and no vomiting  Complications: no  Cardiovascular status: blood pressure returned to baseline and hemodynamically stable  Respiratory status: acceptable, room air and spontaneous ventilation  Hydration status: euvolemic

## 2021-04-14 NOTE — BRIEF OP NOTE
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

## 2021-04-15 ENCOUNTER — TELEPHONE (OUTPATIENT)
Dept: GASTROENTEROLOGY | Age: 81
End: 2021-04-15

## 2021-07-21 ENCOUNTER — TELEPHONE (OUTPATIENT)
Dept: PULMONOLOGY | Age: 81
End: 2021-07-21

## 2021-07-21 ENCOUNTER — HOSPITAL ENCOUNTER (OUTPATIENT)
Age: 81
Discharge: HOME | End: 2021-07-21
Payer: MEDICARE

## 2021-07-21 ENCOUNTER — HOSPITAL ENCOUNTER (OUTPATIENT)
Dept: GENERAL RADIOLOGY | Age: 81
Discharge: HOME | End: 2021-07-21
Payer: MEDICARE

## 2021-07-21 ENCOUNTER — OFFICE VISIT (OUTPATIENT)
Dept: PULMONOLOGY | Age: 81
End: 2021-07-21
Payer: MEDICARE

## 2021-07-21 VITALS
BODY MASS INDEX: 28.68 KG/M2 | OXYGEN SATURATION: 98 % | WEIGHT: 168 LBS | DIASTOLIC BLOOD PRESSURE: 70 MMHG | SYSTOLIC BLOOD PRESSURE: 115 MMHG | HEART RATE: 57 BPM | HEIGHT: 64 IN

## 2021-07-21 DIAGNOSIS — R06.02 SOB (SHORTNESS OF BREATH): ICD-10-CM

## 2021-07-21 DIAGNOSIS — J45.31 MILD PERSISTENT ASTHMA WITH EXACERBATION: ICD-10-CM

## 2021-07-21 DIAGNOSIS — J90 PLEURAL EFFUSION ON LEFT: ICD-10-CM

## 2021-07-21 DIAGNOSIS — I50.32 CHRONIC DIASTOLIC CONGESTIVE HEART FAILURE (HCC): ICD-10-CM

## 2021-07-21 DIAGNOSIS — J98.01 ACUTE BRONCHOSPASM: ICD-10-CM

## 2021-07-21 DIAGNOSIS — J44.9 COPD, MILD (HCC): ICD-10-CM

## 2021-07-21 DIAGNOSIS — J98.01 ACUTE BRONCHOSPASM: Primary | ICD-10-CM

## 2021-07-21 PROCEDURE — 71048 X-RAY EXAM CHEST 4+ VIEWS: CPT

## 2021-07-21 PROCEDURE — 99213 OFFICE O/P EST LOW 20 MIN: CPT | Performed by: INTERNAL MEDICINE

## 2021-07-21 RX ORDER — PREDNISONE 1 MG/1
TABLET ORAL
Qty: 20 TABLET | Refills: 0 | Status: SHIPPED | OUTPATIENT
Start: 2021-07-21 | End: 2022-10-17 | Stop reason: ALTCHOICE

## 2021-07-21 NOTE — PROGRESS NOTES
septal closure occluder device present. Agitated saline injection. Bubble study was negative. 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 No evidence of any pericardial effusion. ASSESSMENT:    1. Acute bronchospasm    2. Mild persistent asthma with exacerbation    3. COPD, mild ([address]    4. SOB (shortness of breath)    5. Chronic diastolic congestive heart failure ([address]    6. Pleural effusion on left          PLAN:   He does see Dr. Jason Jaimes in [facility] for cardiology. He is not aware of recently being diagnosed with CHF. I am going to give him a short tapering course of oral prednisone. I have asked him to get a chest x-ray with right and left lateral decubitus films to see if he has recurrence of his left pleural effusion and any evidence of CHF. I mentioned that he might want to see Dr. Jason Jaimes in the near future. I will not make any changes in his current bronchodilator therapy. I will plan on repeating his pulmonary function test in several months. At any time if he gets more short of breath I told him to go directly to the emergency room. I will continue to follow him    We have discussed the need to maintain yearly flu immunization, pneumococcal vaccination. We have discussed Coronavirus precaution including handwashing practice, wiping items touched in public such as gas pumps, door handles, shopping carts, etc. Self monitoring for infection - fever, chills, cough, SOB. Should they develop symptoms they should call office for further instructions. Return in about 2 months (around 9/21/2021) for Recheck for COPD, Recheck for Asthma, Recheck for Shortness of Breath. This dictation was performed with a verbal recognition program and it was checked for errors. It is possible that there are still dictated errors within this office note. Any errors should be brought immediately to my attention for correction. All efforts were made to ensure that this office note is accurate.

## 2021-09-28 ENCOUNTER — PROCEDURE VISIT (OUTPATIENT)
Dept: PULMONOLOGY | Age: 81
End: 2021-09-28
Payer: MEDICARE

## 2021-09-28 DIAGNOSIS — J45.31 MILD PERSISTENT ASTHMA WITH EXACERBATION: ICD-10-CM

## 2021-09-28 DIAGNOSIS — J44.9 COPD, MILD (HCC): ICD-10-CM

## 2021-09-28 DIAGNOSIS — I50.30 DIASTOLIC CONGESTIVE HEART FAILURE, UNSPECIFIED HF CHRONICITY (HCC): ICD-10-CM

## 2021-09-28 DIAGNOSIS — J45.30 MILD PERSISTENT ASTHMA WITHOUT COMPLICATION: Primary | ICD-10-CM

## 2021-09-28 DIAGNOSIS — R06.02 SOB (SHORTNESS OF BREATH): ICD-10-CM

## 2021-09-28 DIAGNOSIS — J90 PLEURAL EFFUSION ON LEFT: ICD-10-CM

## 2021-09-28 LAB
EXPIRATORY TIME-POST: NORMAL
EXPIRATORY TIME: NORMAL
FEF 25-75% %CHNG: NORMAL
FEF 25-75% %PRED-POST: NORMAL
FEF 25-75% %PRED-PRE: NORMAL
FEF 25-75% PRED: NORMAL
FEF 25-75%-POST: NORMAL
FEF 25-75%-PRE: NORMAL
FEV1 %PRED-POST: 81.9 %
FEV1 %PRED-PRE: 74.8 %
FEV1 PRED: 2.01 L
FEV1-POST: 1.65 L
FEV1-PRE: 1.5 L
FEV1/FVC %PRED-POST: 108.9 %
FEV1/FVC %PRED-PRE: 109.3 %
FEV1/FVC PRED: 70.9 %
FEV1/FVC-POST: 77.2 %
FEV1/FVC-PRE: 77.5 %
FVC %PRED-POST: 72.38 L
FVC %PRED-PRE: 66.2 %
FVC PRED: 2.93 L
FVC-POST: 2.13 L
FVC-PRE: 1.94 L
PEF %PRED-POST: NORMAL
PEF %PRED-PRE: NORMAL
PEF PRED: NORMAL
PEF%CHNG: NORMAL
PEF-POST: NORMAL
PEF-PRE: NORMAL

## 2021-09-28 PROCEDURE — 99213 OFFICE O/P EST LOW 20 MIN: CPT | Performed by: INTERNAL MEDICINE

## 2021-09-28 ASSESSMENT — PULMONARY FUNCTION TESTS
FEV1/FVC_PREDICTED: 70.9
FEV1_POST: 1.65
FEV1_PREDICTED: 2.01
FVC_PERCENT_PREDICTED_PRE: 66.2
FEV1/FVC_PERCENT_PREDICTED_POST: 108.9
FVC_POST: 2.13
FVC_PERCENT_PREDICTED_POST: 72.38
FVC_PREDICTED: 2.93
FEV1/FVC_POST: 77.2
FEV1/FVC_PERCENT_PREDICTED_PRE: 109.3
FVC_PRE: 1.94
FEV1_PERCENT_PREDICTED_PRE: 74.8
FEV1_PERCENT_PREDICTED_POST: 81.9
FEV1/FVC_PRE: 77.5
FEV1_PRE: 1.50

## 2021-09-28 NOTE — PROGRESS NOTES
CHIEF COMPLIANT:  Espinoza Neville presents to the pulmonary clinic today for evaluation, spirometry testing, review of testing results and pulmonary medications. His major complaint today is mild persistent asthma, mild COPD, shortness of breath, diastolic CHF    HPI: Mr. Yordan Abel states that he has had no recent asthmatic or bronchitic exacerbation and is not having any difficulty presently. He continues on Breo daily and albuterol rescue inhaler as needed. He is not aware of going back into congestive heart failure recently. He is not experiencing any worsening shortness of breath. He has had both Wilkins Peter COVID-19 vaccinations with his last vaccination on 2/11/2021 and is scheduled for the booster COVID-19 vaccination in the very near future    Physical Exam:  Constitutional:  He appears well developed and well-nourished. Respiratory: No acute respiratory distress noted  Neurologic: Oriented x3, normal speech    OFFICE SPIROMETRY:  Espinoza Neville demonstrates an FEV1 of 1.50 L with an FVC of 1.94 liters. He demonstrates no significant obstructive lung defect. Because his FEV1 and FVC are both reduced I cannot rule out a restrictive lung process without further testing He shows no significant response to bronchodilators. Overall, his lung function has remained stable over the past year. Chest x-ray with lateral decubitus films on 7/21/2021  The heart was at the upper limits of normal for size with a prosthetic aortic   valve.       No pneumonia or interstitial edema.       Trace effusion was noted on the left. ASSESSMENT:    1. Mild persistent asthma without complication    2. COPD, mild ([address]    3. SOB (shortness of breath)    4. Pleural effusion on left    5. Diastolic congestive heart failure, unspecified HF chronicity ([address]          PLAN:   I will make no change in his bronchodilator therapy.   I am very pleased that he is getting the COVID-19 booster vaccination and he has already received his influenza vaccination. I will continue to follow him    We have discussed the need to maintain yearly flu immunization, pneumococcal vaccination and coronavirus vaccination. We have discussed Coronavirus precaution including handwashing practice, wiping items touched in public such as gas pumps, door handles, shopping carts, etc. Self monitoring for infection - fever, chills, cough, SOB. Should they develop symptoms they should call office for further instructions. Return in about 4 months (around 1/24/2022) for Recheck for COPD, Recheck for Asthma, Recheck for Shortness of Breath. This dictation was performed with a verbal recognition program and it was checked for errors. It is possible that there are still dictated errors within this office note. Any errors should be brought immediately to my attention for correction. All efforts were made to ensure that this office note is accurate.

## 2021-11-03 ENCOUNTER — TELEPHONE (OUTPATIENT)
Dept: PULMONOLOGY | Age: 81
End: 2021-11-03

## 2021-11-03 NOTE — TELEPHONE ENCOUNTER
I did contact Mr. Hulls and told him that I am not aware of any clinical proof that filtering systems are that effective in controlling asthma or chronic lung disease.   However I will research this to see if I can find any new information and discuss it with my colleagues

## 2021-11-03 NOTE — TELEPHONE ENCOUNTER
Patient is asking for a letter in depth regarding his pulmonary issues to submit to insurance for a air filtering system in which was $14,000 and are trying to get some reimbursement. He asked if you could call him so he can explain more in depth what was needed, but really he just needs a letter stating his overall health is improved by this filtering system.   Please advise

## 2022-01-24 ENCOUNTER — OFFICE VISIT (OUTPATIENT)
Dept: PULMONOLOGY | Age: 82
End: 2022-01-24
Payer: MEDICARE

## 2022-01-24 VITALS
HEART RATE: 72 BPM | WEIGHT: 157 LBS | HEIGHT: 64 IN | OXYGEN SATURATION: 98 % | BODY MASS INDEX: 26.8 KG/M2 | SYSTOLIC BLOOD PRESSURE: 110 MMHG | DIASTOLIC BLOOD PRESSURE: 60 MMHG

## 2022-01-24 DIAGNOSIS — J44.9 COPD, MILD (HCC): ICD-10-CM

## 2022-01-24 DIAGNOSIS — J45.30 MILD PERSISTENT ASTHMA WITHOUT COMPLICATION: Primary | ICD-10-CM

## 2022-01-24 DIAGNOSIS — R06.02 SOB (SHORTNESS OF BREATH): ICD-10-CM

## 2022-01-24 DIAGNOSIS — I50.32 CHRONIC DIASTOLIC CONGESTIVE HEART FAILURE (HCC): ICD-10-CM

## 2022-01-24 PROCEDURE — 99213 OFFICE O/P EST LOW 20 MIN: CPT | Performed by: INTERNAL MEDICINE

## 2022-01-24 RX ORDER — FLUTICASONE FUROATE AND VILANTEROL TRIFENATATE 100; 25 UG/1; UG/1
1 POWDER RESPIRATORY (INHALATION) DAILY
Qty: 3 EACH | Refills: 3 | Status: SHIPPED | OUTPATIENT
Start: 2022-01-24

## 2022-01-24 RX ORDER — FLUTICASONE FUROATE AND VILANTEROL TRIFENATATE 100; 25 UG/1; UG/1
1 POWDER RESPIRATORY (INHALATION) DAILY
Qty: 3 EACH | Refills: 3 | Status: SHIPPED | OUTPATIENT
Start: 2022-01-24 | End: 2022-10-17

## 2022-01-24 NOTE — PROGRESS NOTES
SUBJECTIVE:  Chief Complaint: Mild persistent asthma, mild COPD, chronic diastolic CHF, shortness of breath  Mr. Sedrick Moser states that his major problem has been dizziness and he does have a referral to ENT. He denies any worsening shortness of breath and has had no recent bronchitic infections. He does continue on Breo daily and albuterol rescue inhaler as needed. He has had no known COVID-19 exposure or infection and is received all 3 Pfizer COVID-19 vaccinations including his booster vaccination and has received his influenza vaccine. ROS:  Constitution:  HEENT: Negative for ear, throat pain  Cardiovascular: Negative for chest pain, syncope, edema  Pulmonary: See HPI  Musculoskeletal: Negative for DVT, myalgias, arthralgias    OBJECTIVE:  /60   Pulse 72   Ht 5' 4\" (1.626 m)   Wt 157 lb (71.2 kg)   SpO2 98%   BMI 26.95 kg/m²      Physical Exam:  Constitutional:  He appears well developed and well-nourished. Neck:  Supple, No palpable lymphadenopathy, No JVD  Cardiovascular:  S1, S2 Normal, Regular rhythm, no murmurs or gallops, No pericardial  rubs. Pulmonary: Breath sounds are clear throughout all areas without wheezing or rhonchi  Abdomen: Not examined  Extremities: no edema, No DVT  Neurologic: Oriented x3, No focal deficits    Radiology: Chest x-ray 7/21/2021  The heart was at the upper limits of normal for size with a prosthetic aortic   valve.       No pneumonia or interstitial edema.       Trace effusion was noted on the left       PFT: Office spirometry 9/28/2021 demonstrated no significant airways obstruction and no significant response to bronchodilators. Because his FEV1 and FVC are both reduced I cannot rule out a restrictive lung process without further testing      Echocardiogram: 2/28/2019  Left ventricular systolic function is hyperdynamic. Ejection fraction is visually estimated at 75-80%. Mild concentric left ventricular hypertrophy. Grade I diastolic dysfunction.    No regional wall motion abnormalities were detected. Atrial septal closure occluder device present. Agitated saline injection. Bubble study was negative. 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 No evidence of any pericardial effusion  ASSESSMENT:    1. Mild persistent asthma without complication    2. COPD, mild ([address]    3. SOB (shortness of breath)    4. Chronic diastolic congestive heart failure (HCC)          PLAN:   I will go ahead and renew his Breo 100/25 inhaler using 1 inhalation daily. I will make no change in his rescue inhaler or other medications. I will continue to follow him    We have discussed the need to maintain yearly flu immunization, pneumococcal vaccination. We have discussed Coronavirus precaution including handwashing practice, wiping items touched in public such as gas pumps, door handles, shopping carts, etc. Self monitoring for infection - fever, chills, cough, SOB. Should they develop symptoms they should call office for further instructions. Return in about 6 months (around 7/24/2022) for Recheck for COPD, Recheck for Asthma, Recheck for Shortness of Breath. This dictation was performed with a verbal recognition program and it was checked for errors. It is possible that there are still dictated errors within this office note. Any errors should be brought immediately to my attention for correction. All efforts were made to ensure that this office note is accurate.

## 2022-02-02 ENCOUNTER — APPOINTMENT (OUTPATIENT)
Dept: CT IMAGING | Age: 82
End: 2022-02-02
Payer: MEDICARE

## 2022-02-02 ENCOUNTER — APPOINTMENT (OUTPATIENT)
Dept: GENERAL RADIOLOGY | Age: 82
End: 2022-02-02
Payer: MEDICARE

## 2022-02-02 ENCOUNTER — HOSPITAL ENCOUNTER (EMERGENCY)
Age: 82
Discharge: ANOTHER HOSPITAL | End: 2022-02-02
Attending: EMERGENCY MEDICINE
Payer: MEDICARE

## 2022-02-02 VITALS
OXYGEN SATURATION: 99 % | RESPIRATION RATE: 18 BRPM | HEART RATE: 76 BPM | TEMPERATURE: 98 F | SYSTOLIC BLOOD PRESSURE: 153 MMHG | BODY MASS INDEX: 26.95 KG/M2 | DIASTOLIC BLOOD PRESSURE: 91 MMHG | WEIGHT: 157 LBS

## 2022-02-02 DIAGNOSIS — R47.01 EXPRESSIVE APHASIA: ICD-10-CM

## 2022-02-02 DIAGNOSIS — I63.512 CEREBROVASCULAR ACCIDENT (CVA) DUE TO OCCLUSION OF LEFT MIDDLE CEREBRAL ARTERY (HCC): Primary | ICD-10-CM

## 2022-02-02 LAB
ALBUMIN SERPL-MCNC: 3.6 GM/DL (ref 3.4–5)
ALP BLD-CCNC: 73 IU/L (ref 40–129)
ALT SERPL-CCNC: 20 U/L (ref 10–40)
ANION GAP SERPL CALCULATED.3IONS-SCNC: 9 MMOL/L (ref 4–16)
APTT: 23.1 SECONDS (ref 25.1–37.1)
AST SERPL-CCNC: 27 IU/L (ref 15–37)
BASOPHILS ABSOLUTE: 0 K/CU MM
BASOPHILS RELATIVE PERCENT: 0.6 % (ref 0–1)
BILIRUB SERPL-MCNC: 0.9 MG/DL (ref 0–1)
BUN BLDV-MCNC: 20 MG/DL (ref 6–23)
CALCIUM SERPL-MCNC: 8.6 MG/DL (ref 8.3–10.6)
CHLORIDE BLD-SCNC: 105 MMOL/L (ref 99–110)
CO2: 24 MMOL/L (ref 21–32)
CREAT SERPL-MCNC: 0.8 MG/DL (ref 0.9–1.3)
DIFFERENTIAL TYPE: ABNORMAL
EOSINOPHILS ABSOLUTE: 0.2 K/CU MM
EOSINOPHILS RELATIVE PERCENT: 2.2 % (ref 0–3)
GFR AFRICAN AMERICAN: >60 ML/MIN/1.73M2
GFR NON-AFRICAN AMERICAN: >60 ML/MIN/1.73M2
GLUCOSE BLD-MCNC: 204 MG/DL (ref 70–99)
GLUCOSE BLD-MCNC: 89 MG/DL (ref 70–99)
HCT VFR BLD CALC: 46.6 % (ref 42–52)
HEMOGLOBIN: 14.9 GM/DL (ref 13.5–18)
IMMATURE NEUTROPHIL %: 0.4 % (ref 0–0.43)
INR BLD: 1.02 INDEX
LYMPHOCYTES ABSOLUTE: 2.2 K/CU MM
LYMPHOCYTES RELATIVE PERCENT: 30.1 % (ref 24–44)
MCH RBC QN AUTO: 29.7 PG (ref 27–31)
MCHC RBC AUTO-ENTMCNC: 32 % (ref 32–36)
MCV RBC AUTO: 92.8 FL (ref 78–100)
MONOCYTES ABSOLUTE: 0.9 K/CU MM
MONOCYTES RELATIVE PERCENT: 12.2 % (ref 0–4)
NUCLEATED RBC %: 0 %
PDW BLD-RTO: 13.5 % (ref 11.7–14.9)
PLATELET # BLD: 151 K/CU MM (ref 140–440)
PMV BLD AUTO: 9.6 FL (ref 7.5–11.1)
POTASSIUM SERPL-SCNC: 4 MMOL/L (ref 3.5–5.1)
PROTHROMBIN TIME: 13.2 SECONDS (ref 11.7–14.5)
RBC # BLD: 5.02 M/CU MM (ref 4.6–6.2)
SEGMENTED NEUTROPHILS ABSOLUTE COUNT: 3.9 K/CU MM
SEGMENTED NEUTROPHILS RELATIVE PERCENT: 54.5 % (ref 36–66)
SODIUM BLD-SCNC: 138 MMOL/L (ref 135–145)
TOTAL IMMATURE NEUTOROPHIL: 0.03 K/CU MM
TOTAL NUCLEATED RBC: 0 K/CU MM
TOTAL PROTEIN: 5.8 GM/DL (ref 6.4–8.2)
TROPONIN T: <0.01 NG/ML
WBC # BLD: 7.2 K/CU MM (ref 4–10.5)

## 2022-02-02 PROCEDURE — 70450 CT HEAD/BRAIN W/O DYE: CPT

## 2022-02-02 PROCEDURE — 82805 BLOOD GASES W/O2 SATURATION: CPT

## 2022-02-02 PROCEDURE — 84439 ASSAY OF FREE THYROXINE: CPT

## 2022-02-02 PROCEDURE — 96374 THER/PROPH/DIAG INJ IV PUSH: CPT

## 2022-02-02 PROCEDURE — 85025 COMPLETE CBC W/AUTO DIFF WBC: CPT

## 2022-02-02 PROCEDURE — 93005 ELECTROCARDIOGRAM TRACING: CPT | Performed by: EMERGENCY MEDICINE

## 2022-02-02 PROCEDURE — 36415 COLL VENOUS BLD VENIPUNCTURE: CPT

## 2022-02-02 PROCEDURE — 82962 GLUCOSE BLOOD TEST: CPT

## 2022-02-02 PROCEDURE — 85610 PROTHROMBIN TIME: CPT

## 2022-02-02 PROCEDURE — 85730 THROMBOPLASTIN TIME PARTIAL: CPT

## 2022-02-02 PROCEDURE — 96375 TX/PRO/DX INJ NEW DRUG ADDON: CPT

## 2022-02-02 PROCEDURE — 6360000004 HC RX CONTRAST MEDICATION: Performed by: EMERGENCY MEDICINE

## 2022-02-02 PROCEDURE — 83735 ASSAY OF MAGNESIUM: CPT

## 2022-02-02 PROCEDURE — 71045 X-RAY EXAM CHEST 1 VIEW: CPT

## 2022-02-02 PROCEDURE — 70498 CT ANGIOGRAPHY NECK: CPT

## 2022-02-02 PROCEDURE — 84443 ASSAY THYROID STIM HORMONE: CPT

## 2022-02-02 PROCEDURE — 80053 COMPREHEN METABOLIC PANEL: CPT

## 2022-02-02 PROCEDURE — 2500000003 HC RX 250 WO HCPCS: Performed by: EMERGENCY MEDICINE

## 2022-02-02 PROCEDURE — 84484 ASSAY OF TROPONIN QUANT: CPT

## 2022-02-02 PROCEDURE — 6360000002 HC RX W HCPCS: Performed by: EMERGENCY MEDICINE

## 2022-02-02 PROCEDURE — 99284 EMERGENCY DEPT VISIT MOD MDM: CPT

## 2022-02-02 RX ORDER — METHYLPREDNISOLONE SODIUM SUCCINATE 125 MG/2ML
125 INJECTION, POWDER, LYOPHILIZED, FOR SOLUTION INTRAMUSCULAR; INTRAVENOUS ONCE
Status: COMPLETED | OUTPATIENT
Start: 2022-02-02 | End: 2022-02-02

## 2022-02-02 RX ORDER — DIPHENHYDRAMINE HYDROCHLORIDE 50 MG/ML
25 INJECTION INTRAMUSCULAR; INTRAVENOUS ONCE
Status: COMPLETED | OUTPATIENT
Start: 2022-02-02 | End: 2022-02-02

## 2022-02-02 RX ORDER — LORAZEPAM 2 MG/ML
2 INJECTION INTRAMUSCULAR ONCE
Status: DISCONTINUED | OUTPATIENT
Start: 2022-02-02 | End: 2022-02-02

## 2022-02-02 RX ADMIN — IOPAMIDOL 75 ML: 755 INJECTION, SOLUTION INTRAVENOUS at 17:12

## 2022-02-02 RX ADMIN — METHYLPREDNISOLONE SODIUM SUCCINATE 125 MG: 125 INJECTION, POWDER, FOR SOLUTION INTRAMUSCULAR; INTRAVENOUS at 17:02

## 2022-02-02 RX ADMIN — DIPHENHYDRAMINE HYDROCHLORIDE 25 MG: 50 INJECTION, SOLUTION INTRAMUSCULAR; INTRAVENOUS at 17:02

## 2022-02-02 RX ADMIN — FAMOTIDINE 20 MG: 10 INJECTION, SOLUTION INTRAVENOUS at 17:02

## 2022-02-02 NOTE — ED NOTES
Bed: ED-17  Expected date:   Expected time:   Means of arrival:   Comments:  Stroke alert     Cesilia Glasgow RN  02/02/22 [id number]

## 2022-02-02 NOTE — ED PROVIDER NOTES
EMERGENCY DEPARTMENT ENCOUNTER      PCP: Luis Ortiz DO    CHIEF COMPLAINT    Chief Complaint   Patient presents with    Cerebrovascular Accident         Of note, this patient was also evaluated by the attending physician, Dr. Roly Quiñones      [facility]    Golden Kimbrough is a 78 y.o. male who presents emergency department today via EMS with slurred speech, right upper extremity weakness. Was noticed by wife earlier this afternoon, last known well was around 1230. Symptoms worsen. Patient is on Eliquis and Plavix. Has history of A. fib      REVIEW OF SYSTEMS    Unable to obtain due to the fact that the patient has altered mental status  See HPI and nursing notes for additional information. All other review of systems are negative  See HPI and nursing notes for additional information      PAST MEDICAL OR SURGICAL HISTORY    Past Medical History:   Diagnosis Date    A-fib [facility]) 3/31/14    Initial consult    Acute bronchospasm 9/20/2016    Acute exacerbation of chronic obstructive pulmonary disease (COPD) ([address] 1/16/2018    Asthma     Atrial fibrillation ([address]     Blood glucose elevated 12/17/2013    BT=841    Cancer ([address] dx 2003    prostrate- tx surg only/ tx for skin cancer of head now    Chest pain 3/14    COPD, mild ([address] 4/51/4113    Diastolic CHF (HCC)     Edema     Family history of diabetes mellitus     Brother    Fatigue 3/14    Glaucoma     \"watching this now\" Dr Luz Argueta H/O cardiovascular stress test 4/14/14 5/14EF>55% Atrial septal occluder device noted. 4/14No evidence of hemodynamically significant coronary artery disease    H/O cardiovascular stress test 1/14/2016    lexiscan-normal,EF70%    H/O echocardiogram 02/12/15    EF 60% Mildly hypertrophied LV and LV systolic function. Mild MR & TR. Atrial septal occluder device is seen with is fuctioning normally.     H/O echocardiogram 1/14/2016    EF 60% aaortic root mildly dilated with dimension of 4.3    H/O transesophageal echocardiography (HARLAN) for monitoring 5/22/2014    normal septal occluder     Hiatal hernia     planning to see Dr Olivia Deluca about this    History of 24 hour EKG monitoring 5/10/14    Sinus rhythm.  History of cardiovascular stress test 12/16/2013 12/2013-([facility])-Probably normal Regadenoson/Exercise with Tc-99 sestamibi. No ischemia noted. Small fixed defect in anteroseptuma and a second small fixed defect in inferolateral wall, most consistent with attenuation artifact. Normal LV size. Global LVSF normal and normal wall motion;    History of echocardiogram 11/11/2013 11/2013-([facility])-Interatrial septal occluder device is visualized. Normal biventricular systolic function; LVEF - 60-65%. Mild degenerative valvular changes; Mild AI, Mild MR, trace TR, trace PI. No pericardial effusion;    History of heart surgery 11/13    PFO closure by Amplatzer 25mm Cribriform device to intra-atrial septum--> [employer])    History of kidney stones     tx with surgery for this in the past    History of nuclear stress test 2/16/15    EF 70%. WNL    Hx of Doppler ultrasound 5/7/2014    Carotid- right and left normal    Hx of Doppler ultrasound 6/30/14    Right groin US: Negative for pseudoaneurysm findings.  Hx of echocardiogram 3/31/2014    mild mitral and tricusid regurg, mildly hypertrophied left ventricle    Hx of motion sickness     and claustrophobia    HX OTHER MEDICAL 2/18/2014-2/24/2014 2/2014-([facility])- 7 day Event Monitor- Five events were available for review. these were all symptom prompted events. Predominant rhythm shows normal sinus rhythm. No significant arrhythmias recorded. -Dr Adrian Cho     \" I have to move at slow pace\"get sob with exertion    Hyperlipidemia     Hypertension     follows with Dr Benjy Anderson Hypoglycemia     \"dx with this years ago\"    Lung nodule seen on imaging study 1/16/2018    Mild persistent asthma without complication 0/02/7015    Paroxysmal atrial fibrillation (HCC)     symptomatic    Pleural effusion on left 7/21/2021    Prolonged emergence from general anesthesia     PVD (peripheral vascular disease) (HCC)     SOB (shortness of breath) 3/14    St[facility] angina [facility])     Syncope and collapse     Thoracic aortic aneurysm (HCC)     4.5cm    Unspecified cerebral artery occlusion with cerebral infarction     multiple CVA's; weakness in right arm; has recurrent TIA's       CURRENT MEDICATIONS    Current Outpatient Rx   Medication Sig Dispense Refill    fluticasone-vilanterol (BREO ELLIPTA) 100-25 MCG/INH AEPB inhaler Inhale 1 puff into the lungs daily After inhalation then gargle 3 each 3    fluticasone-vilanterol (BREO ELLIPTA) 100-25 MCG/INH AEPB inhaler Inhale 1 puff into the lungs daily After inhalation then gargle 3 each 3    predniSONE (DELTASONE) 5 MG tablet Take 5 PO now, 4 PO qam for 2 days, 2 PO qam for 2 days, 1 PO qam for 3 days, then STOP. (Patient not taking: Reported on 1/24/2022) 20 tablet 0    docusate sodium (COLACE, DULCOLAX) 100 MG CAPS Take 100 mg by mouth daily (Patient not taking: Reported on 7/21/2021) 60 capsule 1    pantoprazole (PROTONIX) 40 MG tablet Take 1 tablet by mouth 2 times daily On med list of Dr Oziel Suarez dated 2/13/2014 60 tablet 3    albuterol sulfate (PROAIR RESPICLICK) 261 (90 Base) MCG/ACT aerosol powder inhalation Inhale 2 puffs into the lungs as needed for Wheezing or Shortness of Breath 1 Inhaler 11    L-methylfolate-B6-B12 (METANX) 3-90.314-2-35 MG CAPS capsule Take 1 capsule by mouth 2 times daily (Patient not taking: Reported on 1/24/2022)      Magnesium 400 MG CAPS Take by mouth      nitroGLYCERIN (NITROSTAT) 0.4 MG SL tablet Place 0.4 mg under the tongue every 5 minutes as needed for Chest pain up to max of 3 total doses. If no relief after 1 dose, call 911.       metoprolol tartrate (LOPRESSOR) 12.5 mg TABS Take 12.5 mg by mouth 2 normal breath sounds, no wheezing   Cardiovascular:  Regular rate and rhythm, normal S1 & S2, no extra heart sounds. GI:  Soft, nondistended, normal bowel sounds, nontender  Musculoskeletal:  No edema, no acute deformities   Integument:  Skin is warm and dry, no obvious rash    Vascular: Radial and DP pulses 2+ equal bilaterally    Neurologic:    - Alert & oriented person, place, time, and situation, mild speech difficulty  - No obvious gross motor deficits  - Cranial nerves 2-12 grossly intact  - Sensation intact to light touch  -Weakness against gravity right upper extremity. Left upper extremity normal limits.  - Normal finger to nose test bilaterally  - Rapid alternating movements intact  - Light touch sensation intact throughout. - Upper and lower extremity DTRs 2+ bilaterally.         ----------------------------------------------------------------------------------------------------------------------    NIH Stroke Scale    (1A) LOC :   - 0  (alert, keenly responsive)    (1B) LOC Questions: (Month / Age)    - 0 (answers both questions correctly)    (1C) LOC Command:  (Open close eyes;  make fist, let go)    - 0  (performs both tasks correctly)    (2) Best Gaze:  (Eyes open-patient follows finger or face)   - 0  (normal)  (3) Visual:  (Introduce visual stimulus to patient's visual field quadrants)   - 0  (no visual loss)    (4)  Facial palsy:  (Show teeth, raise eyebrows and squeeze eyes shut)   - 0  (normal, symmetrical movements)    (5A)  motor arm LEFT  (5B) motor arm RIGHT:  (Elevate extremity to 90° in sitting or 45° if supine -  score drift/movement)   - 1  (drift-limb holds but drifts down before full 10 seconds; does not hit bed)    (6A)  motor leg LEFT:  (6B) motor leg RIGHT:  (Elevate extremity to 30° while supine and score drift/movement)   - 1     (7)  Limb Ataxia:  (Finger-nose, heel-shin)   - 0  (absent)    (8) Sensory:  (Pinprick to face, arms trunk, and leg-compare side to side)   0  (9) Best language: (Name items, describes a picture, read sentence-see attached testing cards)    - 2  (10)  Dysarthria:  (Evaluate speech clarity by patient repeating listed words)   - 1  ( mild to moderate dysarthria-some slurred words)     (11)  Extinction and inattention:  (Use information from prior testing to identify neglect)   - 0  (no neglect)       Total NIHSS:   4    ----------------------------------------------------------------------------------------------------------------------    LABS:  Results for orders placed or performed during the hospital encounter of 02/02/22   CBC Auto Differential   Result Value Ref Range    WBC 7.2 4.0 - 10.5 K/CU MM    RBC 5.02 4.6 - 6.2 M/CU MM    Hemoglobin 14.9 13.5 - 18.0 GM/DL    Hematocrit 46.6 42 - 52 %    MCV 92.8 78 - 100 FL    MCH 29.7 27 - 31 PG    MCHC 32.0 32.0 - 36.0 %    RDW 13.5 11.7 - 14.9 %    Platelets 935 649 - 283 K/CU MM    MPV 9.6 7.5 - 11.1 FL    Differential Type AUTOMATED DIFFERENTIAL     Segs Relative 54.5 36 - 66 %    Lymphocytes % 30.1 24 - 44 %    Monocytes % 12.2 (H) 0 - 4 %    Eosinophils % 2.2 0 - 3 %    Basophils % 0.6 0 - 1 %    Segs Absolute 3.9 K/CU MM    Lymphocytes Absolute 2.2 K/CU MM    Monocytes Absolute 0.9 K/CU MM    Eosinophils Absolute 0.2 K/CU MM    Basophils Absolute 0.0 K/CU MM    Nucleated RBC % 0.0 %    Total Nucleated RBC 0.0 K/CU MM    Total Immature Neutrophil 0.03 K/CU MM    Immature Neutrophil % 0.4 0 - 0.43 %   Comprehensive Metabolic Panel w/ Reflex to MG   Result Value Ref Range    Sodium 138 135 - 145 MMOL/L    Potassium 4.0 3.5 - 5.1 MMOL/L    Chloride 105 99 - 110 mMol/L    CO2 24 21 - 32 MMOL/L    BUN 20 6 - 23 MG/DL    CREATININE 0.8 (L) 0.9 - 1.3 MG/DL    Glucose 89 70 - 99 MG/DL    Calcium 8.6 8.3 - 10.6 MG/DL    Albumin 3.6 3.4 - 5.0 GM/DL    Total Protein 5.8 (L) 6.4 - 8.2 GM/DL    Total Bilirubin 0.9 0.0 - 1.0 MG/DL    ALT 20 10 - 40 U/L    AST 27 15 - 37 IU/L    Alkaline Phosphatase 73 40 - 129 IU/L    GFR Non-African American >60 >60 mL/min/1.73m2    GFR African American >60 >60 mL/min/1.73m2    Anion Gap 9 4 - 16   Protime-INR   Result Value Ref Range    Protime 13.2 11.7 - 14.5 SECONDS    INR 1.02 INDEX   APTT   Result Value Ref Range    aPTT 23.1 (L) 25.1 - 37.1 SECONDS   Troponin   Result Value Ref Range    Troponin T <0.010 <0.01 NG/ML   POCT Glucose   Result Value Ref Range    POC Glucose 204 (H) 70 - 99 MG/DL   EKG 12 Lead   Result Value Ref Range    Ventricular Rate 71 BPM    Atrial Rate 71 BPM    P-R Interval 196 ms    QRS Duration 104 ms    Q-T Interval 406 ms    QTc Calculation (Bazett) 441 ms    P Axis 46 degrees    R Axis -45 degrees    T Axis -8 degrees    Diagnosis       Normal sinus rhythm  Left anterior fascicular block  Nonspecific ST abnormality  Abnormal ECG  When compared with ECG of 11-APR-2021 13:52,  No significant change was found       EKG      EKG Interpretation  Please see ED physician's note for EKG interpretation - Dr. Sejal Guerrero      RADIOLOGY/PROCEDURES    CTA HEAD NECK W CONTRAST   Preliminary Result   No acute abnormality or flow-limiting stenosis of the major arteries of the   head and neck. CT Head WO Contrast   Final Result   No acute intracranial abnormality. Small old infarction in the left parietal lobe, stable. Findings reported to Dr. Sejal Guerrero at 4:54 p.m. on February 2, 2022. XR CHEST PORTABLE    (Results Pending)       ED COURSE & MEDICAL DECISION MAKING      Patient presents as above. Emerge etiologies considered. Patient seen and examined. Stroke alert was called from the field secondary to patient's dysarthria, right upper extremity weakness. Had normal blood glucose. Was taken immediately to CT scan, Noncon was negative. CT[facility] evaluation from [facility] neurology showing concern for left MCA large vessel occlusion. Recommend transfer. Patient is anticoagulated so he is not a TPA candidate.   We will look to transfer, see supervising physician for further details. CRITICAL CARE NOTE:  There was a high probability of clinically significant life-threatening deterioration of the patient's condition requiring my urgent intervention due to acute large vessel occlusion/CVA. patient counseling, specialty consultation, frequent reevaluation was performed to address this. Total critical care time is 30 minutes. This includes vital sign monitoring, pulse oximetry monitoring, telemetry monitoring, clinical response to the IV medications, reviewing the nursing notes, consultation time, dictation/documentation time, and interpretation of the lab work. This time excludes time spent performing procedures and separately billable procedures and family discussion time. Clinical  IMPRESSION    1. Cerebrovascular accident (CVA) due to occlusion of left middle cerebral artery ([address]    2. Expressive aphasia          PLAN  Transfer to [address] [place] [address] Avenue: Please note this report has been produced using speech recognition software and may contain errors related to that system including errors in grammar, punctuation, and spelling, as well as words and phrases that may be inappropriate. If there are any questions or concerns please feel free to contact the dictating provider for clarification.       Pan Sy [address], PA  02/02/22 1329

## 2022-02-02 NOTE — ED PROVIDER NOTES
Please refer to the documentation completed by UNRULY Nixon PA-C for full details of the encounter. I have personally performed a face-to-face evaluation and have fully participated in the care of this patient. I have discussed this case with the Advance Practice Practitioner. I have reviewed and agreed with all pertinent clinical information including history, physical exam, and plan. I have also reviewed and agreed with the medications, allergies, and past medical history for this patient. Critical Care: There is a high probability of clinically significant and life or limb altering change in the patient's condition that requires my immediate attention and intervention. Total critical care time not including separately reportable procedures is at least 35 minutes. My pertinent findings are as follows. Patient is brought to the emergency department with slurred speech and severe right arm weakness. Patient was last seen in his normal state at 1230, approximately 4 hours prior to presentation. There is no report of head injury or seizure activity. Patient is on Eliquis due to history of atrial fibrillation along with Plavix. Physician evaluation:  Vital signs are documented and reviewed. Patient is resting comfortably on the exam table. GCS 15.  Appropriately alert and oriented. Speaks in full and complete sentences with a clear voice. Respiratory pattern is unlabored without wheeze or stridor. Forehead wrinkles intact. No visible facial droop. Conjugate gaze. 4/5 strength in the right biceps, triceps, handgrip. Drift against gravity noted. Left-sided counterparts appear full strength. No gross dermatomal paresthesias.     Results:  Results for orders placed or performed during the hospital encounter of 02/02/22   CBC Auto Differential   Result Value Ref Range    WBC 7.2 4.0 - 10.5 K/CU MM    RBC 5.02 4.6 - 6.2 M/CU MM    Hemoglobin 14.9 13.5 - 18.0 GM/DL    Hematocrit 46.6 42 - 52 % MCV 92.8 78 - 100 FL    MCH 29.7 27 - 31 PG    MCHC 32.0 32.0 - 36.0 %    RDW 13.5 11.7 - 14.9 %    Platelets 012 965 - 956 K/CU MM    MPV 9.6 7.5 - 11.1 FL    Differential Type AUTOMATED DIFFERENTIAL     Segs Relative 54.5 36 - 66 %    Lymphocytes % 30.1 24 - 44 %    Monocytes % 12.2 (H) 0 - 4 %    Eosinophils % 2.2 0 - 3 %    Basophils % 0.6 0 - 1 %    Segs Absolute 3.9 K/CU MM    Lymphocytes Absolute 2.2 K/CU MM    Monocytes Absolute 0.9 K/CU MM    Eosinophils Absolute 0.2 K/CU MM    Basophils Absolute 0.0 K/CU MM    Nucleated RBC % 0.0 %    Total Nucleated RBC 0.0 K/CU MM    Total Immature Neutrophil 0.03 K/CU MM    Immature Neutrophil % 0.4 0 - 0.43 %   Comprehensive Metabolic Panel w/ Reflex to MG   Result Value Ref Range    Sodium 138 135 - 145 MMOL/L    Potassium 4.0 3.5 - 5.1 MMOL/L    Chloride 105 99 - 110 mMol/L    CO2 24 21 - 32 MMOL/L    BUN 20 6 - 23 MG/DL    CREATININE 0.8 (L) 0.9 - 1.3 MG/DL    Glucose 89 70 - 99 MG/DL    Calcium 8.6 8.3 - 10.6 MG/DL    Albumin 3.6 3.4 - 5.0 GM/DL    Total Protein 5.8 (L) 6.4 - 8.2 GM/DL    Total Bilirubin 0.9 0.0 - 1.0 MG/DL    ALT 20 10 - 40 U/L    AST 27 15 - 37 IU/L    Alkaline Phosphatase 73 40 - 129 IU/L    GFR Non-African American >60 >60 mL/min/1.73m2    GFR African American >60 >60 mL/min/1.73m2    Anion Gap 9 4 - 16   Protime-INR   Result Value Ref Range    Protime 13.2 11.7 - 14.5 SECONDS    INR 1.02 INDEX   APTT   Result Value Ref Range    aPTT 23.1 (L) 25.1 - 37.1 SECONDS   Troponin   Result Value Ref Range    Troponin T <0.010 <0.01 NG/ML   POCT Glucose   Result Value Ref Range    POC Glucose 204 (H) 70 - 99 MG/DL   EKG 12 Lead   Result Value Ref Range    Ventricular Rate 71 BPM    Atrial Rate 71 BPM    P-R Interval 196 ms    QRS Duration 104 ms    Q-T Interval 406 ms    QTc Calculation (Bazett) 441 ms    P Axis 46 degrees    R Axis -45 degrees    T Axis -8 degrees    Diagnosis       Normal sinus rhythm  Left anterior fascicular block  Nonspecific ST abnormality  Abnormal ECG  When compared with ECG of 11-APR-2021 13:52,  No significant change was found       Radiology:  CT Head WO Contrast    Result Date: 2/2/2022  EXAMINATION: CT OF THE HEAD WITHOUT CONTRAST  2/2/2022 4:44 pm TECHNIQUE: CT of the head was performed without the administration of intravenous contrast. Dose modulation, iterative reconstruction, and/or weight based adjustment of the mA/kV was utilized to reduce the radiation dose to as low as reasonably achievable. COMPARISON: CT head April 9, 2021 HISTORY: ORDERING SYSTEM PROVIDED HISTORY: Stroke alert, difficulty speaking, last known well 12:30 PM today TECHNOLOGIST PROVIDED HISTORY: Reason for exam:->Stroke alert, difficulty speaking, last known well 12:30 PM today Has a \"code stroke\" or \"stroke alert\" been called? ->Yes Decision Support Exception - unselect if not a suspected or confirmed emergency medical condition->Emergency Medical Condition (MA) Reason for Exam: Stroke alert, difficulty speaking, last known well 12:30 PM today Additional signs and symptoms: no Relevant Medical/Surgical History: none FINDINGS: BRAIN/VENTRICLES: There is small old infarction in the left parietal lobe, stable. There is no acute intracranial hemorrhage, mass effect or midline shift. No abnormal extra-axial fluid collection. The gray-white differentiation is maintained without evidence of an acute infarct. There is no evidence of hydrocephalus. ORBITS: The visualized portion of the orbits demonstrate no acute abnormality. SINUSES: The visualized paranasal sinuses and mastoid air cells demonstrate no acute abnormality. SOFT TISSUES/SKULL:  No acute abnormality of the visualized skull or soft tissues. No acute intracranial abnormality. Small old infarction in the left parietal lobe, stable. Findings reported to Dr. Maria Pacheco at 4:54 p.m. on February 2, 2022.      CTA HEAD NECK W CONTRAST    Result Date: 2/2/2022  EXAMINATION: CTA OF THE HEAD AND NECK WITH CONTRAST 2/2/2022 5:09 pm TECHNIQUE: CTA of the head and neck was performed with the administration of intravenous contrast. Multiplanar reformatted images are provided for review. MIP images are provided for review. Stenosis of the internal carotid arteries measured using NASCET criteria. Dose modulation, iterative reconstruction, and/or weight based adjustment of the mA/kV was utilized to reduce the radiation dose to as low as reasonably achievable. COMPARISON: Noncontrast CT head from earlier today HISTORY: ORDERING SYSTEM PROVIDED HISTORY: Stroke alert, difficulty speaking, last known well 12:30 PM today TECHNOLOGIST PROVIDED HISTORY: Reason for exam:->Stroke alert, difficulty speaking, last known well 12:30 PM today Decision Support Exception - unselect if not a suspected or confirmed emergency medical condition->Emergency Medical Condition (MA) Reason for Exam: Stroke alert, difficulty speaking, last known well 12:30 PM today Additional signs and symptoms: no Relevant Medical/Surgical History: 70 ml isovue 370 used. FINDINGS: CTA NECK: AORTIC ARCH/ARCH VESSELS: No dissection or arterial injury. No significant stenosis of the brachiocephalic or subclavian arteries. CAROTID ARTERIES: No dissection, arterial injury, or hemodynamically significant stenosis by NASCET criteria. VERTEBRAL ARTERIES: No dissection, arterial injury, or significant stenosis. SOFT TISSUES: The lung apices are clear. No cervical or superior mediastinal lymphadenopathy. The larynx and pharynx are unremarkable. No acute abnormality of the salivary and thyroid glands. BONES: No acute osseous abnormality. CTA HEAD: ANTERIOR CIRCULATION: No significant stenosis of the intracranial internal carotid, anterior cerebral, or middle cerebral arteries. No aneurysm. POSTERIOR CIRCULATION: There is left dominance of the vertebral arteries with hypoplastic intracranial right vertebral artery.   No significant stenosis of the left vertebral, basilar, or bilateral posterior cerebral arteries. No aneurysm. OTHER: No dural venous sinus thrombosis on this non-dedicated study. 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 BRAIN: No mass effect or midline shift. No extra-axial fluid collection. The gray-white differentiation is maintained. No acute abnormality or flow-limiting stenosis of the major arteries of the head and neck. EKG:  Twelve-lead EKG obtained at 1750 on 2 February 2022 and interpreted by me in the absence of a cardiologist.  There is no criteria ST elevation or reciprocal change. There are no hyperacute T wave changes. There is no sign of acute ischemia or infarction. This tracing shows a normal sinus rhythm with left anterior fascicular block and nonspecific ST changes. Rate and intervals are 71 beats per minute, UT interval 196 milliseconds, QRS duration 104 milliseconds, QTc interval 441 milliseconds, and R axis left shifted at -45 degrees. There is no acute change compared with the most recent EKG dated April 11, 2021. Emergency department course:  Patient was initially seen by the Advanced Practice Practitioner. Patient is evaluated with the physician assistant in the hallway upon arrival.  Patient is in significantly the CT scan. Orders are immediately placed for CT of head and CT angiogram of head neck. Patient does report IV contrast causing itching and rash, but I believe the benefits outweigh potential risks. Patient will be treated concurrently with methylprednisolone, famotidine, and diphenhydramine. At approximately [address] [place] [address] Radiology reports that there are no acute changes. There are stable appearing remote infarcts. As of approximately 1730 stroke neurologist has been able to evaluate the patient via telemedicine.   After his initial assessment, there were some concerns for an encephalopathic or post ictal source, so he had made recommendations for lorazepam 2 mg. Shortly after, the CT angiogram became available to the stroke neurologist, and he indicates that there is a large vessel occlusion in the left MCA. As patient is right-hand dominant and main symptom is aphasia, he believes that this is likely the cause for these acute symptoms. Stroke neurologist indicates that the lorazepam would not be necessary at this time, so the order is canceled. He is recommending emergent transfer to [facility] emergency department for specialist evaluation and continuing treatment. I have discussed this recommendation with the patient's wife, and she is agreeable. Patient has been accepted for transfer by Dr. Gabriel Woods. EMTALA documentation is complete. As of most recent reevaluation, patient has been transferred out of the emergency department without complication. Clinical Impression:  1. Cerebrovascular accident (CVA) due to occlusion of left middle cerebral artery ([address]    2.  Expressive aphasia      Disposition referral (if applicable):  Clyde Pretty DO  [address]  [address] [facility].  [phone]          Disposition medications (if applicable):  Discharge Medication List as of 2/2/2022  7:04 PM        ED Provider Disposition Time  DISPOSITION Decision To Transfer 02/02/2022 05:58:22 PM        Nohemi Ureña MD  02/03/22 [id number]

## 2022-02-02 NOTE — ED NOTES
Pt to ED via [facility] and EMS for a pre-hospital stroke alert. LKW per wife is 1230. Per EMS wife said pt was eating lunch when he started to stare off into space. Pt c/o a \"weird pressure\" in his head. Pt is on asa and elequis.      Pre-hospital BG was [address] [facility], RN  02/02/22 [address], RN  02/02/22 [id number]

## 2022-02-03 LAB
MAGNESIUM: 2.2 MG/DL (ref 1.8–2.4)
T4 FREE: 1.26 NG/DL (ref 0.9–1.8)
TSH HIGH SENSITIVITY: 1.37 UIU/ML (ref 0.27–4.2)

## 2022-02-04 LAB
EKG ATRIAL RATE: 71 BPM
EKG DIAGNOSIS: NORMAL
EKG P AXIS: 46 DEGREES
EKG P-R INTERVAL: 196 MS
EKG Q-T INTERVAL: 406 MS
EKG QRS DURATION: 104 MS
EKG QTC CALCULATION (BAZETT): 441 MS
EKG R AXIS: -45 DEGREES
EKG T AXIS: -8 DEGREES
EKG VENTRICULAR RATE: 71 BPM

## 2022-02-04 PROCEDURE — 93010 ELECTROCARDIOGRAM REPORT: CPT | Performed by: INTERNAL MEDICINE

## 2022-04-14 DIAGNOSIS — J45.30 MILD PERSISTENT ASTHMA WITHOUT COMPLICATION: Primary | ICD-10-CM

## 2022-04-14 DIAGNOSIS — J44.9 COPD, MILD (HCC): ICD-10-CM

## 2022-04-14 DIAGNOSIS — R06.02 SOB (SHORTNESS OF BREATH): ICD-10-CM

## 2022-04-14 RX ORDER — NEBULIZER ACCESSORIES
1 KIT MISCELLANEOUS DAILY PRN
Qty: 1 KIT | Refills: 0 | Status: SHIPPED | OUTPATIENT
Start: 2022-04-14

## 2022-04-14 RX ORDER — ALBUTEROL SULFATE 2.5 MG/3ML
2.5 SOLUTION RESPIRATORY (INHALATION) EVERY 6 HOURS PRN
Qty: 120 EACH | Refills: 3 | Status: SHIPPED | OUTPATIENT
Start: 2022-04-14

## 2022-07-26 ENCOUNTER — OFFICE VISIT (OUTPATIENT)
Dept: PULMONOLOGY | Age: 82
End: 2022-07-26
Payer: MEDICARE

## 2022-07-26 VITALS
WEIGHT: 163 LBS | BODY MASS INDEX: 27.83 KG/M2 | OXYGEN SATURATION: 98 % | DIASTOLIC BLOOD PRESSURE: 64 MMHG | HEART RATE: 68 BPM | SYSTOLIC BLOOD PRESSURE: 124 MMHG | HEIGHT: 64 IN

## 2022-07-26 DIAGNOSIS — R06.02 SOB (SHORTNESS OF BREATH): ICD-10-CM

## 2022-07-26 DIAGNOSIS — I50.32 CHRONIC DIASTOLIC CONGESTIVE HEART FAILURE (HCC): ICD-10-CM

## 2022-07-26 DIAGNOSIS — J44.9 COPD, MILD (HCC): ICD-10-CM

## 2022-07-26 DIAGNOSIS — J45.30 MILD PERSISTENT ASTHMA WITHOUT COMPLICATION: Primary | ICD-10-CM

## 2022-07-26 PROCEDURE — 99213 OFFICE O/P EST LOW 20 MIN: CPT | Performed by: INTERNAL MEDICINE

## 2022-07-26 PROCEDURE — 1123F ACP DISCUSS/DSCN MKR DOCD: CPT | Performed by: INTERNAL MEDICINE

## 2022-07-26 RX ORDER — ALBUTEROL SULFATE 2.5 MG/3ML
2.5 SOLUTION RESPIRATORY (INHALATION) EVERY 6 HOURS PRN
Qty: 120 EACH | Refills: 3 | Status: SHIPPED | OUTPATIENT
Start: 2022-07-26

## 2022-07-26 NOTE — PROGRESS NOTES
SUBJECTIVE:  Chief Complaint: Mild persistent asthma, mild COPD, shortness of breath, chronic diastolic heart failure  Mr. Eileen Mooney states that he continues to note shortness of breath with minimal exertion and sometimes even at rest.  He has been coughing and expectorating a small amount of clear sputum and is not certain if it is coming from his chest or this is postnasal drip. He denies hemoptysis or chest pain. He has had no major asthmatic or bronchitic infections recently and has not required ER care or urgent care. He continues on Breo daily and his albuterol rescue inhaler usually 2 puffs at nighttime. He has not noted significant wheezing. He has had no known COVID-19 exposure or infection and is received all 3 Pfizer COVID-19 vaccinations. ROS:  Constitution:  HEENT: Negative for ear, throat pain  Cardiovascular: Negative for chest pain, syncope, edema  Pulmonary: See HPI  Musculoskeletal: Negative for DVT, myalgias, arthralgias    OBJECTIVE:  /64   Pulse 68   Ht 5' 4\" (1.626 m)   Wt 163 lb (73.9 kg)   SpO2 98%   BMI 27.98 kg/m²      Physical Exam:  Constitutional:  He appears well developed and well-nourished. Neck:  Supple, No palpable lymphadenopathy, No JVD  Cardiovascular:  S1, S2 Normal, Regular rhythm, no murmurs or gallops, No pericardial  rubs. Pulmonary: Breath sounds are clear throughout all areas without wheezing or rhonchi  Abdomen: Not examined  Extremities: no edema, No DVT  Neurologic: Oriented x3, No focal deficits    Radiology: Chest x-ray on 2/2/2022 showed right-sided perihilar opacity suggesting subsegmental atelectasis or infection  PFT: Office spirometry in 9/28/2021 demonstrated no significant airways obstruction and no significant response to bronchodilators          ASSESSMENT:    1. Mild persistent asthma without complication    2. COPD, mild ([address]    3. SOB (shortness of breath)    4.  Chronic diastolic congestive heart failure ([address]          PLAN:  I did advise him to take Mucinex 1 tablet twice a day for the next week to 10 days to see if this improves his mild congestion and productive cough. I also renewed his albuterol solution for his nebulizer which she can use as needed during the day. I did recommend he continue on Breo daily. Because of my anticipated USP later this summer I will asked Dr. Jolie Wong to continue to follow him and renew his bronchodilators as needed and treat any asthmatic or bronchitic exacerbation. If pulmonary help is needed in the future he can be referred back to [facility] pulmonary at a later date. We have discussed the need to maintain yearly flu immunization, pneumococcal vaccination. We have discussed Coronavirus precaution including handwashing practice, wiping items touched in public such as gas pumps, door handles, shopping carts, etc. Self monitoring for infection - fever, chills, cough, SOB. Should they develop symptoms they should call office for further instructions. No follow-ups on file. This dictation was performed with a verbal recognition program and it was checked for errors. It is possible that there are still dictated errors within this office note. Any errors should be brought immediately to my attention for correction. All efforts were made to ensure that this office note is accurate.

## 2022-09-29 NOTE — ED PROVIDER NOTES
Triage Chief Complaint:   Altered Mental Status      [place]:  Arlyn Chapman is a 78 y.o. male that presents to the emergency department with altered mental status. EMS states that the call was made for choking. They stated when he got there he was not choking and was able to speak. Wife states he was choking \"for quite some time and is unsure if he lost consciousness or not\"  EMs says he seemed to be with it and answering questions however he quickly appeared to have a decreased level of consciousness. They stated it seemed to wax and wane. Upon arrival to the ED the patient is nonverbal.  He does appear to be trying to follow commands such as squeezing my fingers but he is not verbalizing. He has had a history of a previous stroke. Per chart review does have a history of A. fib, asthma, prostate cancer, COPD, CHF hiatal hernia, hypertension, hyperlipidemia, hypoglycemia. He does appear to be on Eliquis per medication list.  He is unable to provide any information or answering questions at this time. Wife is bedside and states he was eating his breakfast when he appeared to be choking on a strawberry. She states she called EMS. When they got there he was satting normally and answering their questions. No seizure activity noted. Past Medical History:   Diagnosis Date    A-fib [facility]) 3/31/14    Initial consult    Acute bronchospasm 9/20/2016    Acute exacerbation of chronic obstructive pulmonary disease (COPD) ([address] 1/16/2018    Asthma     Atrial fibrillation ([address]     Blood glucose elevated 12/17/2013    KC=384    Cancer ([address] dx 2003    prostrate- tx surg only/ tx for skin cancer of head now    Chest pain 3/14    COPD, mild ([address] 3/57/4488    Diastolic CHF (HCC)     Edema     Family history of diabetes mellitus     Brother    Fatigue 3/14    Glaucoma     \"watching this now\" Dr Frances Chan H/O cardiovascular stress test 4/14/14 5/14EF>55% Atrial septal occluder device noted.  4/14No evidence of hemodynamically significant coronary artery disease    H/O cardiovascular stress test 1/14/2016    lexiscan-normal,EF70%    H/O echocardiogram 02/12/15    EF 60% Mildly hypertrophied LV and LV systolic function. Mild MR & TR. Atrial septal occluder device is seen with is fuctioning normally.  H/O echocardiogram 1/14/2016    EF 60% aaortic root mildly dilated with dimension of 4.3    H/O transesophageal echocardiography (HARLAN) for monitoring 5/22/2014    normal septal occluder     Hiatal hernia     planning to see Dr Gill Or about this    History of 24 hour EKG monitoring 5/10/14    Sinus rhythm.  History of cardiovascular stress test 12/16/2013 12/2013-([facility])-Probably normal Regadenoson/Exercise with Tc-99 sestamibi. No ischemia noted. Small fixed defect in anteroseptuma and a second small fixed defect in inferolateral wall, most consistent with attenuation artifact. Normal LV size. Global LVSF normal and normal wall motion;    History of echocardiogram 11/11/2013 11/2013-([facility])-Interatrial septal occluder device is visualized. Normal biventricular systolic function; LVEF - 60-65%. Mild degenerative valvular changes; Mild AI, Mild MR, trace TR, trace PI. No pericardial effusion;    History of heart surgery 11/13    PFO closure by Amplatzer 25mm Cribriform device to intra-atrial septum--> One [employer])    History of kidney stones     tx with surgery for this in the past    History of nuclear stress test 2/16/15    EF 70%. WNL    Hx of Doppler ultrasound 5/7/2014    Carotid- right and left normal    Hx of Doppler ultrasound 6/30/14    Right groin US: Negative for pseudoaneurysm findings.     Hx of echocardiogram 3/31/2014    mild mitral and tricusid regurg, mildly hypertrophied left ventricle    Hx of motion sickness     and claustrophobia    HX OTHER MEDICAL 2/18/2014-2/24/2014 2/2014-([facility])- 7 day Event Monitor- Five events were available for review. these were all symptom prompted events. Predominant rhythm shows normal sinus rhythm. No significant arrhythmias recorded. -Dr Krystal Torrez     \" I have to move at slow pace\"get sob with exertion    Hyperlipidemia     Hypertension     follows with Dr Wing Cantu Hypoglycemia     \"dx with this years ago\"    Lung nodule seen on imaging study 1/16/2018    Mild persistent asthma without complication 6/72/2294    Paroxysmal atrial fibrillation (HCC)     symptomatic    Prolonged emergence from general anesthesia     PVD (peripheral vascular disease) ([address]     SOB (shortness of breath) 3/14    Stable angina (HCC)     Syncope and collapse     Thoracic aortic aneurysm ([address]     4.5cm    Unspecified cerebral artery occlusion with cerebral infarction     multiple CVA's; weakness in right arm; has recurrent TIA's     Past Surgical History:   Procedure Laterality Date    CARDIAC SURGERY  11/11/2013 11/2013-PFO closure by an Amplatzer 25mm Cribriform device in the intra-atrial septum- [id number] [place]  03/2019    one stint placement    CHOLECYSTECTOMY  2001    lap choley-per old chart done [id number] [address] [place]  2006    CYSTOSCOPY  1990's    with stone manipulation    ENDOSCOPY, COLON, DIAGNOSTIC  7/24/15    duodenal stricture,fundic gland polyp, dilation 10-15mm, hiatal hernia    ENDOSCOPY, COLON, DIAGNOSTIC  04/26/2017    mild hiatal hernia, duodenal obstruction, possible duodenal trauma r/t dilation, clips placed, stomach polyps x2    EYE SURGERY  2006    per old chart right eye cataract ext with IOL    [address]    per old chart left ing hernia repair done 1970's    JOINT REPLACEMENT Left     hip    NASAL SEPTUM SURGERY  1986    for deviation    OTHER SURGICAL HISTORY  11/11/2013    Dr. Brandt Ramires S# [id number] Model [id number]-[id number]    OTHER SURGICAL or organizations: Not on file     Relationship status: Not on file    Intimate partner violence     Fear of current or ex partner: Not on file     Emotionally abused: Not on file     Physically abused: Not on file     Forced sexual activity: Not on file   Other Topics Concern    Not on file   Social History Narrative    Not on file     No current facility-administered medications for this encounter. Current Outpatient Medications   Medication Sig Dispense Refill    predniSONE (DELTASONE) 5 MG tablet Take 6 PO now, 5 PO qam for 2 days, 3 PO qam for 3 days, 2 PO qam for 4 days, 1 PO qam for 5 days, then STOP 38 tablet 0    fluticasone-vilanterol (BREO ELLIPTA) 100-25 MCG/INH AEPB inhaler Inhale 1 puff into the lungs daily After inhalation then gargle 90 each 3    albuterol sulfate (PROAIR RESPICLICK) 477 (90 Base) MCG/ACT aerosol powder inhalation Inhale 2 puffs into the lungs as needed for Wheezing or Shortness of Breath 1 Inhaler 11    fluticasone-vilanterol (BREO ELLIPTA) 100-25 MCG/INH AEPB inhaler Inhale 1 puff into the lungs daily After inhalation then gargle 3 each 3    famotidine (PEPCID) 40 MG tablet Take 40 mg by mouth daily      L-methylfolate-B6-B12 (METANX) 4-37.480-4-68 MG CAPS capsule Take 1 capsule by mouth 2 times daily      Magnesium 400 MG CAPS Take by mouth      guaiFENesin (BREONESIN PO) Take by mouth      nitroGLYCERIN (NITROSTAT) 0.4 MG SL tablet Place 0.4 mg under the tongue every 5 minutes as needed for Chest pain up to max of 3 total doses. If no relief after 1 dose, call 911.  metoprolol tartrate (LOPRESSOR) 12.5 mg TABS Take 12.5 mg by mouth 2 times daily      furosemide (LASIX) 40 MG tablet Take 40 mg by mouth as needed      ALPRAZolam (XANAX) 0.25 MG tablet Take 0.5 mg by mouth nightly as needed for Sleep.        albuterol (PROVENTIL) (2.5 MG/3ML) 0.083% nebulizer solution Take 3 mLs by nebulization every 6 hours as needed for Wheezing 120 each 3    predniSONE (DELTASONE) 5 MG tablet Take 5 PO now, 4 PO qam for 2 days, 2 PO qam for 2 days, 1 PO qam for 3 days, then STOP. (Patient not taking: Reported on 12/14/2020) 20 tablet 0    apixaban (ELIQUIS) 2.5 MG TABS tablet Take 2 tablets by mouth 2 times daily 180 tablet 3    latanoprost (XALATAN) 0.005 % ophthalmic solution Place 1 drop into both eyes nightly      atorvastatin (LIPITOR) 40 MG tablet Take 40 mg by mouth nightly       aspirin 81 MG tablet Take 81 mg by mouth daily      fexofenadine (HM FEXOFENADINE HCL) 180 MG tablet Take 180 mg by mouth daily      acetaminophen 650 MG TABS Take 650 mg by mouth every 4 hours as needed 120 tablet 3    montelukast (SINGULAIR) 10 MG tablet Take 10 mg by mouth nightly.  pantoprazole (PROTONIX) 40 MG tablet Take 40 mg by mouth daily. On med list of Dr Aubree Fonseca dated 2/13/2014       Allergies   Allergen Reactions    Aminophylline     Contrast [Iodides]      Patient states had IV contrast with negative results     Other      slobid    Pentazocine Lactate [Pentazocine]     Prednisone      Shakes, nausea    Theophyllines      Nursing Notes Reviewed    ROS:  Unable to assess    Physical Exam:  ED Triage Vitals   Enc Vitals Group      BP 04/09/21 1102 (!) 172/90      Pulse 04/09/21 1058 68      Resp 04/09/21 1058 19      Temp 04/09/21 1102 98.2 °F (36.8 °C)      Temp Source 04/09/21 1102 Oral      SpO2 04/09/21 1102 100 %      Weight 04/09/21 1058 168 lb (76.2 kg)      Height 04/09/21 1058 5' 4\" (1.626 m)      Head Circumference --       Peak Flow --       Pain Score --       Pain Loc --       Pain Edu? --       Excl. in [address]? --      My pulse oximetry interpretation is which is within the normal range    GENERAL APPEARANCE: Patient's eyes are open and he is awake. He is nonverbal.  HEAD: Normocephalic. Atraumatic. EYES: EOM's grossly intact. Sclera anicteric. ENT: Mucous membranes are moist. Tolerates saliva. No trismus.  No obvious obstruction on finger sweep. + gag reflex. NECK: Supple. No meningismus. Trachea midline. HEART: RRR. Radial pulses 2+. LUNGS: Respirations unlabored. CTAB  ABDOMEN: Soft. Non-tender. No guarding or rebound. EXTREMITIES: No acute deformities. SKIN: Warm and dry. NEUROLOGICAL: Patient nonverbal.  Squeezing fingers with both hands. Feels equal  strength.   Not able to comply with much else of neuro exam.  PSYCHIATRIC unable to assess    I have reviewed and interpreted all of the currently available lab results from this visit (if applicable):  Results for orders placed or performed during the hospital encounter of 04/09/21   COVID-19, Rapid    Specimen: Nasopharyngeal   Result Value Ref Range    Source UNKNOWN     SARS-CoV-2, NAAT NOT DETECTED NOT DETECTED   CBC Auto Differential   Result Value Ref Range    WBC 5.6 4.0 - 10.5 K/CU MM    RBC 5.33 4.6 - 6.2 M/CU MM    Hemoglobin 16.1 13.5 - 18.0 GM/DL    Hematocrit 50.4 42 - 52 %    MCV 94.6 78 - 100 FL    MCH 30.2 27 - 31 PG    MCHC 31.9 (L) 32.0 - 36.0 %    RDW 12.3 11.7 - 14.9 %    Platelets 287 027 - 322 K/CU MM    MPV 9.8 7.5 - 11.1 FL    Differential Type AUTOMATED DIFFERENTIAL     Segs Relative 63.6 36 - 66 %    Lymphocytes % 25.9 24 - 44 %    Monocytes % 6.2 (H) 0 - 4 %    Eosinophils % 3.6 (H) 0 - 3 %    Basophils % 0.5 0 - 1 %    Segs Absolute 3.6 K/CU MM    Lymphocytes Absolute 1.5 K/CU MM    Monocytes Absolute 0.4 K/CU MM    Eosinophils Absolute 0.2 K/CU MM    Basophils Absolute 0.0 K/CU MM    Nucleated RBC % 0.0 %    Total Nucleated RBC 0.0 K/CU MM    Total Immature Neutrophil 0.01 K/CU MM    Immature Neutrophil % 0.2 0 - 0.43 %   Comprehensive Metabolic Panel w/ Reflex to MG   Result Value Ref Range    Sodium 134 (L) 135 - 145 MMOL/L    Potassium 3.8 3.5 - 5.1 MMOL/L    Chloride 100 99 - 110 mMol/L    CO2 25 21 - 32 MMOL/L    BUN 19 6 - 23 MG/DL    CREATININE 0.9 0.9 - 1.3 MG/DL    Glucose 115 (H) 70 - 99 MG/DL    Calcium 8.9 8.3 - 10.6 MG/DL    Albumin 3.8 3.4 - 5.0 GM/DL Total Protein 6.6 6.4 - 8.2 GM/DL    Total Bilirubin 1.3 (H) 0.0 - 1.0 MG/DL    ALT 28 10 - 40 U/L    AST 39 (H) 15 - 37 IU/L    Alkaline Phosphatase 74 40 - 129 IU/L    GFR Non-African American >60 >60 mL/min/1.73m2    GFR African American >60 >60 mL/min/1.73m2    Anion Gap 9 4 - 16   Troponin   Result Value Ref Range    Troponin T <0.010 <0.01 NG/ML   Protime-INR   Result Value Ref Range    Protime 14.2 11.7 - 14.5 SECONDS    INR 1.17 INDEX   Lactic Acid, Plasma   Result Value Ref Range    Lactate 2.1 (HH) 0.4 - 2.0 mMOL/L   POCT Glucose   Result Value Ref Range    POC Glucose 151 (H) 70 - 99 MG/DL   EKG 12 Lead   Result Value Ref Range    Ventricular Rate 70 BPM    Atrial Rate 70 BPM    P-R Interval 196 ms    QRS Duration 114 ms    Q-T Interval 402 ms    QTc Calculation (Bazett) 434 ms    P Axis 45 degrees    R Axis -42 degrees    T Axis 5 degrees    Diagnosis       Normal sinus rhythm  Possible Left atrial enlargement  Left axis deviation  Left ventricular hypertrophy  Abnormal ECG  When compared with ECG of 13-MAR-2019 13:47,  No significant change was found          Radiographs:  [] Radiologist's Wet Read Report Reviewed:      XR CHEST PORTABLE (Final result)  Result time 04/09/21 12:30:53  Final result by Jenny Shultz MD (04/09/21 12:30:53)                Impression:    Mild pulmonary vascular congestion. Stable mild cardiomegaly. Mild left pleural effusion. Narrative:    EXAMINATION:   ONE XRAY VIEW OF THE CHEST     4/9/2021 11:55 am     COMPARISON:   December 23, 2020     HISTORY:   ORDERING SYSTEM PROVIDED HISTORY: AMS   TECHNOLOGIST PROVIDED HISTORY:   Reason for exam:->AMS   Reason for Exam: AMS   Acuity: Acute   Type of Exam: Initial     FINDINGS:   The cardiomediastinal silhouette is stable.  There is mild pulmonary vascular   congestion.  There is mild left pleural effusion.  There is no pneumothorax.    There is no acute osseous abnormality.                     CTA HEAD NECK W significant stenosis of the vertebral, basilar, or   posterior cerebral arteries. No aneurysm. OTHER: No dural venous sinus thrombosis on this non-dedicated study. BRAIN: No mass effect or midline shift. No extra-axial fluid collection. The   gray-white differentiation is maintained.                 Preliminary result by Eric Witt MD (04/09/21 12:07:18)                Impression:    No acute abnormality or flow-limiting stenosis of the major arteries of the   head and neck.                     CT HEAD WO CONTRAST (Edited Result - FINAL)  Result time 04/09/21 12:49:14  Addendum 1 of 1 by Eric Witt MD (04/09/21 12:49:14)    ADDENDUM:  Results reported to Dr. Anum Peterson at 11:22 a.m. on April 9, 2021.               Final result by Eric Witt MD (04/09/21 11:26:25)                Impression:    No acute intracranial abnormality. Small old infarction in the left parietal lobe, stable. No evidence of acute   territorial infarction. Mild parenchymal volume loss. Mild chronic microvascular disease. Narrative:    EXAMINATION:   CT OF THE HEAD WITHOUT CONTRAST  4/9/2021 11:03 am     TECHNIQUE:   CT of the head was performed without the administration of intravenous   contrast. Dose modulation, iterative reconstruction, and/or weight based   adjustment of the mA/kV was utilized to reduce the radiation dose to as low   as reasonably achievable. COMPARISON:   CT head March 13, 2019     HISTORY:   ORDERING SYSTEM PROVIDED HISTORY: [facility]   TECHNOLOGIST PROVIDED HISTORY:   Reason for exam:->AMS   Has a \"code stroke\" or \"stroke alert\" been called? ->Yes   Decision Support Exception->Emergency Medical Condition (MA)   Reason for Exam: STROKE ALERT;AMS   Acuity: Acute   Type of Exam: Initial     FINDINGS:   BRAIN/VENTRICLES: There is mild parenchymal volume loss.  There is   periventricular white matter low attenuation, likely related to mild chronic   microvascular disease. Gisela Woo is no acute intracranial hemorrhage, mass   effect or midline shift.  No abnormal extra-axial fluid collection. Carly Guise is   a small old infarction in the left parietal lobe, stable.  No evidence of   acute territorial infarction. Carly Guise is no hydrocephalus. ORBITS: The visualized portion of the orbits demonstrate no acute abnormality. SINUSES: The visualized paranasal sinuses and mastoid air cells demonstrate   no acute abnormality. SOFT TISSUES/SKULL:  No acute abnormality of the visualized skull or soft   tissues.                       [] Discussed with Radiologist:     [] The following radiograph was interpreted by myself in the absence of a radiologist:     EKG: (All EKG's are interpreted by myself in the absence of a cardiologist)  The Ekg interpreted by me shows  normal sinus rhythm with a rate of 70  Axis is   Left axis deviation  QTc is  normal  Intervals and Durations are unremarkable. ST Segments: depression in III  No significant change from prior EKG dated 3-          MDM:  Patient's vitals are stable. Patient appears to be trying to follow commands but is not answering questions. EKG does not have any acute findings. She is Accu-Chek is 151. Patient was taken directly to CT scan to have a CT head and CTA head and neck. He did have a iodine in his allergy list that was not listed as anaphylaxis. I did pretreat him with Benadryl and Solu-Medrol. CT head was called to me as no acute findings by Dr. Tana Guzman at 11:21 AM. Dr Liz Deluca saw patient on [facility] stroke camera and stated no TPA and appears to have normal CT head and CTA head. Does not feel he needs transfer but will need workup and MRI. Feels this might be due to hypoxia from choking patient CBC shows a normal white count of 5.6. Hemoglobin is 16.1. Electrolytes shows a glucose of 115. Troponin is within normal limits. Coags are normal.  Lactic acid is 2.1. CT head shows no acute abnormality.   Small old infarct in the left parietal lobe, stable. No evidence of acute territorial infarction. Mild parenchymal volume loss. Mild chronic microvascular disease. CTA head neck acute abnormality or flow-limiting stenosis of the major arteries of the head or neck. CXR shows mild pulmonary vascular congestion. Stable cardiomegaly. Mild left pleural effusion. . Urinalysis pending. covid negative. Patient still awake alert and oriented and squeezing hands. Trying to talk but unintelligible. Clinical Impression:  1. Altered mental status, unspecified altered mental status type    2. Nonverbal        Disposition Vitals:  [unfilled], [unfilled], [unfilled], [unfilled]    Disposition referral (if applicable):  No follow-up provider specified.     Disposition medications (if applicable):  New Prescriptions    No medications on file         (Please note that portions of this note may have been completed with a voice recognition program. Efforts were made to edit the dictations but occasionally words are mis-transcribed.)    MD Johnnie Vincent MD  04/09/21 0433 No

## 2022-10-17 ENCOUNTER — OFFICE VISIT (OUTPATIENT)
Dept: GASTROENTEROLOGY | Age: 82
End: 2022-10-17
Payer: MEDICARE

## 2022-10-17 VITALS
TEMPERATURE: 97.8 F | HEIGHT: 64 IN | OXYGEN SATURATION: 98 % | SYSTOLIC BLOOD PRESSURE: 128 MMHG | HEART RATE: 57 BPM | DIASTOLIC BLOOD PRESSURE: 74 MMHG | BODY MASS INDEX: 29.33 KG/M2 | WEIGHT: 171.8 LBS

## 2022-10-17 DIAGNOSIS — Z86.010 HISTORY OF COLON POLYPS: ICD-10-CM

## 2022-10-17 DIAGNOSIS — R19.7 DIARRHEA, UNSPECIFIED TYPE: ICD-10-CM

## 2022-10-17 PROBLEM — Z86.0100 HISTORY OF COLON POLYPS: Status: ACTIVE | Noted: 2022-10-17

## 2022-10-17 PROCEDURE — 99214 OFFICE O/P EST MOD 30 MIN: CPT | Performed by: SPECIALIST

## 2022-10-17 PROCEDURE — 1123F ACP DISCUSS/DSCN MKR DOCD: CPT | Performed by: SPECIALIST

## 2022-10-17 RX ORDER — AZELASTINE 1 MG/ML
1 SPRAY, METERED NASAL 2 TIMES DAILY
COMMUNITY
Start: 2022-08-11

## 2022-10-17 NOTE — PROGRESS NOTES
Gastroenterology  Note  Arlene Lovell. Shen Davies MD      Subjective:      Patient ID:      Abhijit Horn                 1940    CC: diarrhea    HPI:     Had diarrhea for about 6 weeks- better now . Had negative stool C diff and pathogen PCR has pc cramping. No melena, hematochezia.just finished course of Levaquin for bronchitis, sinusitis-- that seemed to help his diarrhea    ROS: see HPI for positives and pertinent negatives. All other systems reviewed and are negative    Objective:     PHYSICAL EXAM:    Vitals: There were no vitals taken for this visit. CONSTITUTIONAL: alert, cooperative, no apparent distress,   EYES:  pupils equal, round and reactive to light and sclera clear  ENT:  normocepalic, without obvious abnormality  NECK:  supple, symmetrical, trachea midline  HEMATOLOGIC/LYMPHATICS:  no cervical lymphadenopathy and no supraclavicular lymphadenopathy  LUNGS:  clear to auscultation  CARDIOVASCULAR:  regular rate and rhythm and no murmur noted  ABDOMEN:  normal bowel sounds, soft, non-distended, non-tender with no masses or hepatomegaly palpated  NEUROLOGIC: no focal deficit detected  SKIN:  no lesions  EXTREMITIES: no clubbing, cyanosis, or edema     Assessment:     1) diarrhea- seems better  2) history of non-advanced adenoma colon 2015  3) history of duodenal stricture- doing well from that standpoint  4) had cardiac stent 2-3 years ago     Plan:     1) colonoscopy at [facility] due to increased cardiac risk          Dhaval Damico M.D.

## 2022-10-28 RX ORDER — SODIUM CHLORIDE, SODIUM LACTATE, POTASSIUM CHLORIDE, CALCIUM CHLORIDE 600; 310; 30; 20 MG/100ML; MG/100ML; MG/100ML; MG/100ML
INJECTION, SOLUTION INTRAVENOUS CONTINUOUS
Status: CANCELLED | OUTPATIENT
Start: 2022-11-02

## 2022-10-28 NOTE — PROGRESS NOTES
Patient will be called with an arrival time on 11/1/2022 for his procedure at [facility] on 11/2/2022. 1. Do not eat or drink anything after midnight - unless instructed by your doctor prior to surgery. This includes                   no water, chewing gum or mints. 2. Follow your directions as prescribed by the doctor for your procedure and medications. 3. Check with your Doctor regarding stopping vitamins, supplements, blood thinners and follow their instructions. Stop vitamins, supplements and NSAIDS:    4. Do not smoke, vape or use chewing tobacco morning of surgery. Do not drink any alcoholic beverages 24 hours prior to surgery. This includes NA Beer. No street drugs 7 days prior to surgery. 5. You may brush your teeth and gargle the morning of surgery. DO NOT SWALLOW WATER   6. You MUST make arrangements for a responsible adult to take you home after your surgery and be able to check on you every couple                   hours for the day. You will not be allowed to leave alone or drive yourself home. It is strongly suggested someone stay with you the first 24                   hrs. Your surgery will be cancelled if you do not have a ride home. 7. Please wear simple, loose fitting clothing to the hospital.  Savage Velasco not bring valuables (money, credit cards, checkbooks, etc.) Do not wear any                   makeup (including no eye makeup) or nail polish on your fingers or toes. 8. DO NOT wear any jewelry or piercings on day of surgery. All body piercing jewelry must be removed. 9. If you have dentures, they will be removed before going to the OR; we will provide you a container. If you wear contact lenses or glasses,                  they will be removed; please bring a case for them. 10. If you  have a Living Will and Durable Power of  for Healthcare, please bring in a copy.            11. Please bring picture ID,  insurance card, paperwork from the doctors office    (H & P, Consent, & card for implantable devices). 12. Take a shower the morning of your procedure with Hibiclens or an anti-bacterial soap. Do not apply any make-up, deodorant, lotion, oil or powder. 15.  Enter thru the main entrance on the day of surgery. Patient will take a breathing treatment is he feels like he needs one the morning of his procedure, He will; use his breo ellipta inhaler and bring his rescue inhaler with him that day. He will take his protonix  the morning of his procedure. Patient had no questions concerning colon prep instructions at this time.

## 2022-10-31 NOTE — H&P
Original H &P in soft chart. I have examined the patient immediately before the procedure and there is no change in the previous history and physical exam, which has been reviewed. There is no history of sleep apnea, snoring, or stridor. There has been no  previous adverse experience with sedation/anesthesia. There is no increased risk for aspiration of gastric contents. The patient has been instructed that all resuscitative measures (during the operative and immediate perioperative period) will be instituted in the unlikely event that they will be needed. The patient has no pertinent past surgical or family history other than listed in the original H&P. The patient was counseled about the risks of agueda Covid-19 during their perioperative period and any recovery window from their procedure. The patient was made aware that agueda Covid-19  may worsen their prognosis for recovering from their procedure  and lend to a higher morbidity and/or mortality risk. All material risks, benefits, and reasonable alternatives including postponing the procedure were discussed. The patient does wish to proceed with the procedure at this time.     ASA Class: 3  AIRWAY Class: 1

## 2022-11-01 ENCOUNTER — ANESTHESIA EVENT (OUTPATIENT)
Dept: ENDOSCOPY | Age: 82
End: 2022-11-01
Payer: MEDICARE

## 2022-11-01 ASSESSMENT — COPD QUESTIONNAIRES: CAT_SEVERITY: MILD

## 2022-11-01 ASSESSMENT — ENCOUNTER SYMPTOMS: SHORTNESS OF BREATH: 1

## 2022-11-01 NOTE — PROGRESS NOTES
Spoke with patient and he will arrive at [address] [place] [address] at [facility] on 11/2/2022 for his procedure at 1015. Orders in epic, placed by Dr Dov Polk.

## 2022-11-01 NOTE — ANESTHESIA PRE PROCEDURE
Department of Anesthesiology  Preprocedure Note       Name:  Gab Harkins. Age:  78 y.o.  :  1940                                          MRN:  [id number]         Date:  2022      Surgeon: Josh Arnold):  Jethro Frausto MD    Procedure: Procedure(s):  COLONOSCOPY DIAGNOSTIC    Medications prior to admission:   Prior to Admission medications    Medication Sig Start Date End Date Taking?  Authorizing Provider   UNABLE TO FIND 2 times daily Metanx Levomefolate/ Algae-s/ Pyridoxal Phos/ Methylocbal 3/90.314/35/2mg Cap    Historical Provider, MD   azelastine (ASTELIN) 0.1 % nasal spray 1 spray by Nasal route 2 times daily 22   Historical Provider, MD   albuterol (PROVENTIL) (2.5 MG/3ML) 0.083% nebulizer solution Take 3 mLs by nebulization every 6 hours as needed for Wheezing 22   Temitope Obrien MD   albuterol (PROVENTIL) (2.5 MG/3ML) 0.083% nebulizer solution Take 3 mLs by nebulization every 6 hours as needed for Wheezing 22   Temitope Obrien MD   Respiratory Therapy Supplies (NEBULIZER/TUBING/MOUTHPIECE) KIT 1 kit by Does not apply route daily as needed (PRN) 22   Temitope Obrien MD   fluticasone-vilanterol (BREO ELLIPTA) 100-25 MCG/INH AEPB inhaler Inhale 1 puff into the lungs daily After inhalation then gargle 1/24/22 10/17/22  Temitope Obrien MD   fluticasone-vilanterol (BREO ELLIPTA) 100-25 MCG/INH AEPB inhaler Inhale 1 puff into the lungs daily After inhalation then gargle 22   Temitope Obrien MD   docusate sodium (COLACE, DULCOLAX) 100 MG CAPS Take 100 mg by mouth daily  Patient not taking: No sig reported 21   Yuriy Ramsey MD   pantoprazole (PROTONIX) 40 MG tablet Take 1 tablet by mouth 2 times daily On med list of Dr Shital Russell dated 2014   Yuriy Ramsey MD   albuterol sulfate (PROAIR RESPICLICK) 183 (90 Base) MCG/ACT aerosol powder inhalation Inhale 2 puffs into the lungs as needed for Wheezing or Shortness of Breath 21   Temitope Obrien MD L-methylfolate-B6-B12 (METANX) 6-50.599-2-63 MG CAPS capsule Take 1 capsule by mouth 2 times daily    Historical Provider, MD   Magnesium 400 MG CAPS Take by mouth    Historical Provider, MD   nitroGLYCERIN (NITROSTAT) 0.4 MG SL tablet Place 0.4 mg under the tongue every 5 minutes as needed for Chest pain up to max of 3 total doses. If no relief after 1 dose, call 911. Historical Provider, MD   furosemide (LASIX) 40 MG tablet Take 40 mg by mouth as needed    Historical Provider, MD   ALPRAZolam (XANAX) 0.25 MG tablet Take 0.5 mg by mouth nightly as needed for Sleep. Historical Provider, MD   apixaban (ELIQUIS) 2.5 MG TABS tablet Take 2 tablets by mouth 2 times daily 3/5/19   Vaughn Cortez MD   latanoprost (XALATAN) 0.005 % ophthalmic solution Place 1 drop into both eyes nightly 11/15/18   Historical Provider, MD   atorvastatin (LIPITOR) 40 MG tablet Take 40 mg by mouth nightly  2/7/17   Historical Provider, MD   aspirin 81 MG tablet Take 81 mg by mouth daily    Historical Provider, MD   fexofenadine (ALLEGRA) 180 MG tablet Take 180 mg by mouth daily    Historical Provider, MD   acetaminophen 650 MG TABS Take 650 mg by mouth every 4 hours as needed  Patient not taking: No sig reported 7/26/15   Karina Sanchez DO   montelukast (SINGULAIR) 10 MG tablet Take 10 mg by mouth nightly.     Historical Provider, MD       Current medications:    Current Outpatient Medications   Medication Sig Dispense Refill    UNABLE TO FIND 2 times daily Metanx Levomefolate/ Algae-s/ Pyridoxal Phos/ Methylocbal 3/90.314/35/2mg Cap      azelastine (ASTELIN) 0.1 % nasal spray 1 spray by Nasal route 2 times daily      albuterol (PROVENTIL) (2.5 MG/3ML) 0.083% nebulizer solution Take 3 mLs by nebulization every 6 hours as needed for Wheezing 120 each 3    albuterol (PROVENTIL) (2.5 MG/3ML) 0.083% nebulizer solution Take 3 mLs by nebulization every 6 hours as needed for Wheezing 120 each 3    Respiratory Therapy Supplies (NEBULIZER/TUBING/MOUTHPIECE) KIT 1 kit by Does not apply route daily as needed (PRN) 1 kit 0    fluticasone-vilanterol (BREO ELLIPTA) 100-25 MCG/INH AEPB inhaler Inhale 1 puff into the lungs daily After inhalation then gargle 3 each 3    fluticasone-vilanterol (BREO ELLIPTA) 100-25 MCG/INH AEPB inhaler Inhale 1 puff into the lungs daily After inhalation then gargle 3 each 3    docusate sodium (COLACE, DULCOLAX) 100 MG CAPS Take 100 mg by mouth daily (Patient not taking: No sig reported) 60 capsule 1    pantoprazole (PROTONIX) 40 MG tablet Take 1 tablet by mouth 2 times daily On med list of Dr Gaudencio Griggs dated 2/13/2014 60 tablet 3    albuterol sulfate (PROAIR RESPICLICK) 161 (90 Base) MCG/ACT aerosol powder inhalation Inhale 2 puffs into the lungs as needed for Wheezing or Shortness of Breath 1 Inhaler 11    L-methylfolate-B6-B12 (METANX) 6-58.630-1-06 MG CAPS capsule Take 1 capsule by mouth 2 times daily      Magnesium 400 MG CAPS Take by mouth      nitroGLYCERIN (NITROSTAT) 0.4 MG SL tablet Place 0.4 mg under the tongue every 5 minutes as needed for Chest pain up to max of 3 total doses. If no relief after 1 dose, call 911.  furosemide (LASIX) 40 MG tablet Take 40 mg by mouth as needed      ALPRAZolam (XANAX) 0.25 MG tablet Take 0.5 mg by mouth nightly as needed for Sleep.  apixaban (ELIQUIS) 2.5 MG TABS tablet Take 2 tablets by mouth 2 times daily 180 tablet 3    latanoprost (XALATAN) 0.005 % ophthalmic solution Place 1 drop into both eyes nightly      atorvastatin (LIPITOR) 40 MG tablet Take 40 mg by mouth nightly       aspirin 81 MG tablet Take 81 mg by mouth daily      fexofenadine (ALLEGRA) 180 MG tablet Take 180 mg by mouth daily      acetaminophen 650 MG TABS Take 650 mg by mouth every 4 hours as needed (Patient not taking: No sig reported) 120 tablet 3    montelukast (SINGULAIR) 10 MG tablet Take 10 mg by mouth nightly.        No current facility-administered medications for this visit. Allergies: Allergies   Allergen Reactions    Lidocaine Anaphylaxis    Propofol Anaphylaxis     COULDN'T BREATH OR TALK    Aminophylline     Contrast [Iodides]      Patient states had IV contrast with negative results     Folate [Folic Acid]     Other      slobid    Pentazocine Lactate [Pentazocine]     Prednisone      Shakes, nausea    Theophyllines     Vitamin B-1 [Thiamine Hcl]     Vitamin B12        Problem List:    Patient Active Problem List   Diagnosis Code    Pneumonia J18.9    Hypertension I10    Hyperlipemia E78.5    Chest pain on exertion R07.9    SOB (shortness of breath) R06.02    General weakness R53.1    PFO (patent foramen ovale) Q21.12    History of 24 hour EKG monitoring C60.71    Diastolic CHF (HCC) Z93.18    Stable angina ([facility]) I20.8    Edema R60.9    Groin discomfort R10.30    Hx of Doppler ultrasound Z92.89    Hx of Doppler ultrasound Z92.89    Ileus ([facility]) K56.7    Mild persistent asthma without complication B26.32    Acute bronchospasm J98.01    COPD, mild ([facility]) J44.9    Asthma exacerbation J45. 0    Hiatal hernia K44.9    Pancreatitis, unspecified pancreatitis type K85.90    Acute exacerbation of chronic obstructive pulmonary disease (COPD) ([facility]) J44.1    Lung nodule seen on imaging study R91.1    TIA (transient ischemic attack) G45.9    Seizure ([address] R56.9    Altered mental status R41.82    Thoracic aortic aneurysm I71.20    Encephalopathy acute G93.40    Esophageal dysphagia R13.19    Pleural effusion on left J90    Diarrhea R19.7    History of colon polyps Z86.010       Past Medical History:        Diagnosis Date    A-fib [facility] 03/31/2014    Initial consult    Acute bronchospasm 09/20/2016    Acute exacerbation of chronic obstructive pulmonary disease (COPD) ([address] 01/16/2018    Asthma     Atrial fibrillation ([address]     Blood glucose elevated 12/17/2013    IN=327    Cancer ([address] dx 2003    prostrate- tx surg only/ tx for skin 05/07/2014    Carotid- right and left normal    Hx of Doppler ultrasound 06/30/2014    Right groin US: Negative for pseudoaneurysm findings.  Hx of echocardiogram 03/31/2014    mild mitral and tricusid regurg, mildly hypertrophied left ventricle    Hx of motion sickness     and claustrophobia    HX OTHER MEDICAL 2/18/2014-2/24/2014 2/2014-([facility])- 7 day Event Monitor- Five events were available for review. these were all symptom prompted events. Predominant rhythm shows normal sinus rhythm. No significant arrhythmias recorded. -Dr Ash Khan     \" I have to move at slow pace\"get sob with exertion    Hyperlipidemia     Hypertension     follows with Dr Danisha Richardson Hypoglycemia     \"dx with this years ago\"    Kidney stones     Lung nodule seen on imaging study 01/16/2018    Mild persistent asthma without complication 60/90/0206    Paroxysmal atrial fibrillation (HCC)     symptomatic    Pleural effusion on left 07/21/2021    Prolonged emergence from general anesthesia     PVD (peripheral vascular disease) (HCC)     SOB (shortness of breath) 03/2014    Stable angina (HCC)     Syncope and collapse     Thoracic aortic aneurysm     4.5cm    Unspecified cerebral artery occlusion with cerebral infarction     multiple CVA's; weakness in right arm; has recurrent TIA's       Past Surgical History:        Procedure Laterality Date    CARDIAC SURGERY  11/11/2013 11/2013-PFO closure by an Amplatzer 25mm Cribriform device in the intra-atrial septum- [address]  03/2019    one stint placement    CHOLECYSTECTOMY  2001    lap choley-per old chart done [address]  2006    CYSTOSCOPY  1990's    with stone manipulation    ENDOSCOPY, COLON, DIAGNOSTIC  7/24/15    duodenal stricture,fundic gland polyp, dilation 10-15mm, hiatal hernia    ENDOSCOPY, COLON, DIAGNOSTIC  04/26/2017    mild hiatal hernia, duodenal obstruction, possible duodenal trauma r/t dilation, clips placed, stomach polyps x2    EYE SURGERY      per old chart right eye cataract ext with IOL   [address] [facility] Left     per old chart left ing hernia repair done     JOINT REPLACEMENT Left     hip    NASAL SEPTUM SURGERY      for deviation    OTHER SURGICAL HISTORY  2013    Dr. Gabriel Dai S# [id number] Model [id number]    OTHER SURGICAL HISTORY  2010    EUA with drainage of rectal abscess    PROSTATECTOMY      ROTATOR CUFF REPAIR Right 2008    SKIN BIOPSY      head squamous cell    TONSILLECTOMY  1950    UPPER GASTROINTESTINAL ENDOSCOPY N/A 2020    EGD DILATION BALLOON 18-20 BALLOON DILATED TO 20 performed by Yenny Ceron MD at [address] 2021    EGD DILATION BALLOON WITH 18-20 BALLOON AND DILITATION WITH 18 SAVORY performed by Yenny Ceron MD at [address]       Social History:    Social History     Tobacco Use    Smoking status: Former     Packs/day: 1.00     Years: 5.00     Pack years: 5.00     Types: Cigarettes     Quit date: 1970     Years since quittin.8    Smokeless tobacco: Never    Tobacco comments:     smoked less than 1/2 pack- use to smoke a pipe- quit 1972   Substance Use Topics    Alcohol use: Yes     Comment: rare alcohol;     CAFFEINE: coffee/ average drinking alcohol 6 itimes per year                                Counseling given: Not Answered  Tobacco comments: smoked less than 1/2 pack- use to smoke a pipe- quit 1972      Vital Signs (Current): There were no vitals filed for this visit.                                            BP Readings from Last 3 Encounters:   10/17/22 128/74   22 124/64   22 (!) 153/91       NPO Status:                                                                                 BMI:   Wt Readings from Last 3 Encounters:   10/17/22 171 lb 12.8 oz (77.9 kg)   07/26/22 163 lb (73.9 kg)   02/02/22 157 lb (71.2 kg)     There is no height or weight on file to calculate BMI.    CBC:   Lab Results   Component Value Date/Time    WBC 7.2 02/02/2022 04:46 PM    RBC 5.02 02/02/2022 04:46 PM    HGB 14.9 02/02/2022 04:46 PM    HCT 46.6 02/02/2022 04:46 PM    MCV 92.8 02/02/2022 04:46 PM    RDW 13.5 02/02/2022 04:46 PM     02/02/2022 04:46 PM       CMP:   Lab Results   Component Value Date/Time     02/02/2022 04:46 PM    K 4.0 02/02/2022 04:46 PM     02/02/2022 04:46 PM    CO2 24 02/02/2022 04:46 PM    BUN 20 02/02/2022 04:46 PM    CREATININE 0.8 02/02/2022 04:46 PM    GFRAA >60 02/02/2022 04:46 PM    LABGLOM >60 02/02/2022 04:46 PM    GLUCOSE 89 02/02/2022 04:46 PM    PROT 5.8 02/02/2022 04:46 PM    PROT 6.2 12/11/2012 07:28 AM    CALCIUM 8.6 02/02/2022 04:46 PM    BILITOT 0.9 02/02/2022 04:46 PM    ALKPHOS 73 02/02/2022 04:46 PM    AST 27 02/02/2022 04:46 PM    ALT 20 02/02/2022 04:46 PM       POC Tests: No results for input(s): POCGLU, POCNA, POCK, POCCL, POCBUN, POCHEMO, POCHCT in the last 72 hours.     Coags:   Lab Results   Component Value Date/Time    PROTIME 13.2 02/02/2022 04:46 PM    INR 1.02 02/02/2022 04:46 PM    APTT 23.1 02/02/2022 04:46 PM       HCG (If Applicable): No results found for: PREGTESTUR, PREGSERUM, HCG, HCGQUANT     ABGs: No results found for: PHART, PO2ART, ODY5MNW, ZBY4MKE, BEART, M2FFTJWI     Type & Screen (If Applicable):  No results found for: LABABO, LABRH    Drug/Infectious Status (If Applicable):  Lab Results   Component Value Date/Time    HEPCAB NON REACTIVE 11/18/2017 03:30 AM       COVID-19 Screening (If Applicable):   Lab Results   Component Value Date/Time    COVID19 NOT DETECTED 04/09/2021 11:38 AM    COVID19 NOT DETECTED 12/18/2020 11:01 AM           Anesthesia Evaluation     History of anesthetic complications: prolonged emergence was following likely versed/opioid by gastroenterologist > 10 y ago. The listed lidocaine and propofol anaphylaxis allergy is an error. He has received those in 12/20 and 4/21 without any issue. Airway: Mallampati: II  TM distance: >3 FB   Neck ROM: full  Mouth opening: < 3 FB   Dental: normal exam         Pulmonary:normal exam    (+) pneumonia:  COPD: mild,  shortness of breath:  asthma:                            Cardiovascular:  Exercise tolerance: poor (<4 METS),   (+) hypertension:, valvular problems/murmurs: MR, angina:, dysrhythmias: atrial fibrillation, CHF: diastolic, hyperlipidemia         Beta Blocker:  Dose within 24 Hrs      ROS comment: 11/2013-([facility])-Interatrial septal occluder device is visualized. Normal biventricular systolic function; LVEF - 60-65%. Mild degenerative valvular changes; Mild AI, Mild MR, trace TR, trace PI. No pericardial effusion;    PFO closure by Amplatzer 25mm Cribriform device to intra-atrial septum--> [employer])     Neuro/Psych:   (+) seizures:, CVA:, TIA,             GI/Hepatic/Renal:   (+) hiatal hernia, GERD:, renal disease: kidney stones,           Endo/Other:    (+) blood dyscrasia: anticoagulation therapy:., electrolyte abnormalities, malignancy/cancer. Abdominal:             Vascular: Other Findings:             Anesthesia Plan      MAC     ASA 4       Induction: intravenous. Pre Anesthesia Evaluation complete. Anesthesia plan, risks, benefits, alternatives, and personnel discussed with patient and/or legal guardian. Patient and/or legal guardian verbalized an understanding and agreed to proceed. Anesthesia plan discussed with care team members and agreed upon.   IFITKHAR Riddle CRNA  11/1/2022        IFTIKHAR Riddle CRNA   11/1/2022

## 2022-11-02 ENCOUNTER — HOSPITAL ENCOUNTER (OUTPATIENT)
Age: 82
Setting detail: OUTPATIENT SURGERY
Discharge: HOME | End: 2022-11-02
Attending: SPECIALIST | Admitting: SPECIALIST
Payer: MEDICARE

## 2022-11-02 ENCOUNTER — ANESTHESIA (OUTPATIENT)
Dept: ENDOSCOPY | Age: 82
End: 2022-11-02
Payer: MEDICARE

## 2022-11-02 VITALS
TEMPERATURE: 97.4 F | SYSTOLIC BLOOD PRESSURE: 127 MMHG | RESPIRATION RATE: 18 BRPM | OXYGEN SATURATION: 98 % | HEIGHT: 64 IN | WEIGHT: 167 LBS | BODY MASS INDEX: 28.51 KG/M2 | DIASTOLIC BLOOD PRESSURE: 67 MMHG | HEART RATE: 57 BPM

## 2022-11-02 DIAGNOSIS — R14.0 BLOATING: ICD-10-CM

## 2022-11-02 DIAGNOSIS — R19.7 DIARRHEA, UNSPECIFIED TYPE: ICD-10-CM

## 2022-11-02 PROBLEM — D12.5 BENIGN NEOPLASM OF SIGMOID COLON: Status: ACTIVE | Noted: 2022-11-02

## 2022-11-02 PROCEDURE — 7100000010 HC PHASE II RECOVERY - FIRST 15 MIN: Performed by: SPECIALIST

## 2022-11-02 PROCEDURE — 2580000003 HC RX 258: Performed by: SPECIALIST

## 2022-11-02 PROCEDURE — 45385 COLONOSCOPY W/LESION REMOVAL: CPT | Performed by: SPECIALIST

## 2022-11-02 PROCEDURE — 88305 TISSUE EXAM BY PATHOLOGIST: CPT | Performed by: PATHOLOGY

## 2022-11-02 PROCEDURE — 7100000011 HC PHASE II RECOVERY - ADDTL 15 MIN: Performed by: SPECIALIST

## 2022-11-02 PROCEDURE — 3700000001 HC ADD 15 MINUTES (ANESTHESIA): Performed by: SPECIALIST

## 2022-11-02 PROCEDURE — 6360000002 HC RX W HCPCS: Performed by: NURSE ANESTHETIST, CERTIFIED REGISTERED

## 2022-11-02 PROCEDURE — 3609010600 HC COLONOSCOPY POLYPECTOMY SNARE/COLD BIOPSY: Performed by: SPECIALIST

## 2022-11-02 PROCEDURE — 2709999900 HC NON-CHARGEABLE SUPPLY: Performed by: SPECIALIST

## 2022-11-02 PROCEDURE — 3700000000 HC ANESTHESIA ATTENDED CARE: Performed by: SPECIALIST

## 2022-11-02 PROCEDURE — 2720000010 HC SURG SUPPLY STERILE: Performed by: SPECIALIST

## 2022-11-02 RX ORDER — PROPOFOL 10 MG/ML
INJECTION, EMULSION INTRAVENOUS PRN
Status: DISCONTINUED | OUTPATIENT
Start: 2022-11-02 | End: 2022-11-02 | Stop reason: SDUPTHER

## 2022-11-02 RX ORDER — SODIUM CHLORIDE, SODIUM LACTATE, POTASSIUM CHLORIDE, CALCIUM CHLORIDE 600; 310; 30; 20 MG/100ML; MG/100ML; MG/100ML; MG/100ML
INJECTION, SOLUTION INTRAVENOUS CONTINUOUS
Status: DISCONTINUED | OUTPATIENT
Start: 2022-11-02 | End: 2022-11-02 | Stop reason: HOSPADM

## 2022-11-02 RX ADMIN — PROPOFOL 50 MG: 10 INJECTION, EMULSION INTRAVENOUS at 10:41

## 2022-11-02 RX ADMIN — SODIUM CHLORIDE, POTASSIUM CHLORIDE, SODIUM LACTATE AND CALCIUM CHLORIDE: 600; 310; 30; 20 INJECTION, SOLUTION INTRAVENOUS at 09:11

## 2022-11-02 RX ADMIN — PROPOFOL 30 MG: 10 INJECTION, EMULSION INTRAVENOUS at 10:49

## 2022-11-02 RX ADMIN — PHENYLEPHRINE HYDROCHLORIDE 100 MCG: 10 INJECTION INTRAVENOUS at 10:58

## 2022-11-02 RX ADMIN — PROPOFOL 30 MG: 10 INJECTION, EMULSION INTRAVENOUS at 10:48

## 2022-11-02 RX ADMIN — PHENYLEPHRINE HYDROCHLORIDE 100 MCG: 10 INJECTION INTRAVENOUS at 10:52

## 2022-11-02 RX ADMIN — PHENYLEPHRINE HYDROCHLORIDE 100 MCG: 10 INJECTION INTRAVENOUS at 10:48

## 2022-11-02 RX ADMIN — PROPOFOL 40 MG: 10 INJECTION, EMULSION INTRAVENOUS at 10:43

## 2022-11-02 RX ADMIN — PROPOFOL 50 MG: 10 INJECTION, EMULSION INTRAVENOUS at 10:58

## 2022-11-02 RX ADMIN — PROPOFOL 50 MG: 10 INJECTION, EMULSION INTRAVENOUS at 10:46

## 2022-11-02 RX ADMIN — PROPOFOL 50 MG: 10 INJECTION, EMULSION INTRAVENOUS at 10:52

## 2022-11-02 ASSESSMENT — PAIN SCALES - GENERAL
PAINLEVEL_OUTOF10: 0
PAINLEVEL_OUTOF10: 0

## 2022-11-02 ASSESSMENT — PAIN - FUNCTIONAL ASSESSMENT: PAIN_FUNCTIONAL_ASSESSMENT: 0-10

## 2022-11-02 NOTE — PROGRESS NOTES
1114 Pt returned to unit. Handoff received from [facility]. VSS, patient is drowsy. Friend at bedside. Pt reports no pain or nausea. 1125 Pt alert, beverage and crackers provided. Pt requested warm blanket. Call light within reach 1135 Pt VSS, AxO, patient resting in bed drinking coffee. 1159 Discharge instructions reviewed with Pt. And friend. Pt changing for discharge.

## 2022-11-02 NOTE — H&P
SCANNED COLONOSCOPY REPORT:   The original colonoscopy report with photos can be found by going to \"chart review\" then \"procedures\" then double click on \"colonoscopy\"

## 2022-11-02 NOTE — ANESTHESIA POSTPROCEDURE EVALUATION
Department of Anesthesiology  Postprocedure Note    Patient: Delle Gottron. MRN: [id number]  YOB: 1940  Date of evaluation: 11/2/2022      Procedure Summary     Date: 11/02/22 Room / Location: [address]    Anesthesia Start: 1033 Anesthesia Stop: 0832    Procedure: COLONOSCOPY POLYPECTOMY SNARE/COLD BIOPSY HEMOCLIP PLACED X2 IN THE SIGMOID COLON Diagnosis:       Diarrhea, unspecified type      Bloating      (Diarrhea, unspecified type [R19.7])      (Bloating [R14.0])    Surgeons: Kwabena Smith MD Responsible Provider: Michelle Khan MD    Anesthesia Type: MAC ASA Status: 4          Anesthesia Type: MAC    Anu Phase I:      Anu Phase II:        Anesthesia Post Evaluation    Patient location during evaluation: bedside  Patient participation: waiting for patient participation  Level of consciousness: sleepy but conscious  Pain score: 0  Airway patency: patent  Nausea & Vomiting: no nausea and no vomiting  Complications: no  Cardiovascular status: hemodynamically stable  Respiratory status: acceptable, room air and nonlabored ventilation  Hydration status: euvolemic  There was medical reason for not using a multimodal analgesia pain management approach.

## 2022-11-02 NOTE — DISCHARGE INSTRUCTIONS
COLONOSCOPY    DR. Herminia Treviño    OFFICE NUMBER     CALL TO MAKE FOLLOW UP APPOINTMENT    DOES NOT NEED TO REPEAT PROCEDURE     1 POLYP REMOVED    What to expect at home: Your Recovery   Your doctor will tell you when you can eat and do your other usual activities Your doctor will talk to you about when you will need your next colonoscopy. Your doctor can help you decide how often you need to be checked. This will depend on the results of your test and your risk for colorectal cancer. After the test, you may be bloated or have gas pains. You may need to pass gas. If a biopsy was done or a polyp was removed, you may have streaks of blood in your stool (feces) for a few days. This care sheet gives you a general idea about how long it will take for you to recover. But each person recovers at a different pace. Follow the steps below to get better as quickly as possible. How can you care for yourself at home? Activity  Rest when you feel tired. Diet  Follow your doctor's directions for eating. Unless your doctor has told you not to, drink plenty of fluids. This helps to replace the fluids that were lost during the colon prep. DO NOT DRINK ALCOHOL. Medicines  Your doctor will tell you if and when you can restart your medicines. He or she will also give you instructions about taking any new medicines. If you take blood thinners, such as warfarin (Coumadin), clopidogrel (Plavix), or aspirin, be sure to talk to your doctor. He or she will tell you if and when to start taking those medicines again. Make sure that you understand exactly what your doctor wants you to do. If polyps were removed or a biopsy was done during the test, your doctor may tell you not to take aspirin or other anti-inflammatory medicines for a few days. These include ibuprofen (Advil, Motrin) and naproxen (Aleve). Other instructions: Anethesia  For your safety, do not drive or operate machinery for 24 hours.   Do not sign legal documents or make major decisions for 24 hours. The anesthesia can make it hard for you to fully understand what you are agreeing to. Follow-up care is a key part of your treatment and safety. Be sure to make and go to all appointments, and call your doctor if you are having problems. It's also a good idea to know your test results and keep a list of the medicines you take. When should you call for help? [address] [facility] Humphrey Burton Jennifer [phone]  Call 911 anytime you think you may need emergency care. For example, call if:  You passed out (lost consciousness). You pass maroon or bloody stools. You have severe belly pain. Call your doctor now or seek immediate medical care if:  Your stools are black and tarlike. Your stools have streaks of blood, but you did not have a biopsy or any polyps removed. You have belly pain, or your belly is swollen and firm. You vomit. You have a fever. You are very dizzy. Watch closely for changes in your health, and be sure to contact your doctor if you have any problems. Where can you learn more? Go to https://[employer]web.[employer]. org and sign in to your [employer] account. Enter E264 in the [facility] box to learn more about Colonoscopy: What to Expect at Home.     If you do not have an account, please click on the Sign Up Now link. © 7073-7506 Healthwise, Incorporated. Care instructions adapted under license by [facility] ([facility]). This care instruction is for use with your licensed healthcare professional. If you have questions about a medical condition or this instruction, always ask your healthcare professional. [place] [address] any warranty or liability for your use of this information.   Content Version: 54.9.242535; Current as of: November 20, 2015                 [facility]  [phone]    Do not drive, work around [address] [place], [place] [address]th St or use Where Do You Want The Question To Include Opioid Counseling Located?: Case Summary Tab

## 2023-07-21 ENCOUNTER — HOSPITAL ENCOUNTER (INPATIENT)
Age: 83
LOS: 2 days | Discharge: HOME | DRG: 056 | End: 2023-07-23
Attending: STUDENT IN AN ORGANIZED HEALTH CARE EDUCATION/TRAINING PROGRAM | Admitting: STUDENT IN AN ORGANIZED HEALTH CARE EDUCATION/TRAINING PROGRAM
Payer: MEDICARE

## 2023-07-21 ENCOUNTER — APPOINTMENT (OUTPATIENT)
Dept: GENERAL RADIOLOGY | Age: 83
DRG: 056 | End: 2023-07-21
Payer: MEDICARE

## 2023-07-21 ENCOUNTER — APPOINTMENT (OUTPATIENT)
Dept: CT IMAGING | Age: 83
DRG: 056 | End: 2023-07-21
Payer: MEDICARE

## 2023-07-21 DIAGNOSIS — R41.82 ALTERED MENTAL STATUS, UNSPECIFIED ALTERED MENTAL STATUS TYPE: ICD-10-CM

## 2023-07-21 DIAGNOSIS — J18.9 PNEUMONIA OF BOTH LOWER LOBES DUE TO INFECTIOUS ORGANISM: Primary | ICD-10-CM

## 2023-07-21 LAB
ALBUMIN SERPL-MCNC: 3.7 GM/DL (ref 3.4–5)
ALP BLD-CCNC: 59 IU/L (ref 40–129)
ALT SERPL-CCNC: 26 U/L (ref 10–40)
AMPHETAMINES: NEGATIVE
ANION GAP SERPL CALCULATED.3IONS-SCNC: 9 MMOL/L (ref 4–16)
AST SERPL-CCNC: 36 IU/L (ref 15–37)
BACTERIA: NEGATIVE /HPF
BARBITURATE SCREEN URINE: NEGATIVE
BASE EXCESS MIXED: 0.3 (ref 0–1.2)
BASOPHILS ABSOLUTE: 0 K/CU MM
BASOPHILS RELATIVE PERCENT: 0.6 % (ref 0–1)
BENZODIAZEPINE SCREEN, URINE: NEGATIVE
BILIRUB SERPL-MCNC: 1 MG/DL (ref 0–1)
BILIRUBIN URINE: NEGATIVE MG/DL
BLOOD, URINE: ABNORMAL
BUN SERPL-MCNC: 21 MG/DL (ref 6–23)
CALCIUM SERPL-MCNC: 8.4 MG/DL (ref 8.3–10.6)
CANNABINOID SCREEN URINE: NEGATIVE
CHLORIDE BLD-SCNC: 103 MMOL/L (ref 99–110)
CHP ED QC CHECK: YES
CLARITY: CLEAR
CO2: 25 MMOL/L (ref 21–32)
COCAINE METABOLITE: NEGATIVE
COLOR: YELLOW
COMMENT: ABNORMAL
CREAT SERPL-MCNC: 0.9 MG/DL (ref 0.9–1.3)
DIFFERENTIAL TYPE: ABNORMAL
EKG ATRIAL RATE: 68 BPM
EKG DIAGNOSIS: NORMAL
EKG P AXIS: 37 DEGREES
EKG P-R INTERVAL: 194 MS
EKG Q-T INTERVAL: 392 MS
EKG QRS DURATION: 110 MS
EKG QTC CALCULATION (BAZETT): 416 MS
EKG R AXIS: -37 DEGREES
EKG T AXIS: -5 DEGREES
EKG VENTRICULAR RATE: 68 BPM
EOSINOPHILS ABSOLUTE: 0.2 K/CU MM
EOSINOPHILS RELATIVE PERCENT: 4.6 % (ref 0–3)
FENTANYL URINE: NEGATIVE
GFR SERPL CREATININE-BSD FRML MDRD: >60 ML/MIN/1.73M2
GLUCOSE BLD-MCNC: 108 MG/DL
GLUCOSE SERPL-MCNC: 83 MG/DL (ref 70–99)
GLUCOSE, URINE: NEGATIVE MG/DL
HCO3 VENOUS: 26 MMOL/L (ref 19–25)
HCT VFR BLD CALC: 34.6 % (ref 42–52)
HEMOGLOBIN: 11.1 GM/DL (ref 13.5–18)
IMMATURE NEUTROPHIL %: 0.4 % (ref 0–0.43)
KETONES, URINE: NEGATIVE MG/DL
LACTATE: 0.8 MMOL/L (ref 0.5–1.9)
LACTATE: 1.8 MMOL/L (ref 0.5–1.9)
LEUKOCYTE ESTERASE, URINE: NEGATIVE
LYMPHOCYTES ABSOLUTE: 1.3 K/CU MM
LYMPHOCYTES RELATIVE PERCENT: 27.7 % (ref 24–44)
MCH RBC QN AUTO: 30.9 PG (ref 27–31)
MCHC RBC AUTO-ENTMCNC: 32.1 % (ref 32–36)
MCV RBC AUTO: 96.4 FL (ref 78–100)
MONOCYTES ABSOLUTE: 0.5 K/CU MM
MONOCYTES RELATIVE PERCENT: 11.3 % (ref 0–4)
MUCUS: ABNORMAL HPF
NITRITE URINE, QUANTITATIVE: NEGATIVE
NUCLEATED RBC %: 0 %
O2 SAT, VEN: 88.4 % (ref 50–70)
OPIATES, URINE: NEGATIVE
OXYCODONE, OPI5M: NEGATIVE
PCO2, VEN: 45 MMHG (ref 38–52)
PDW BLD-RTO: 13 % (ref 11.7–14.9)
PH VENOUS: 7.37 (ref 7.32–7.42)
PH, URINE: 7.5 (ref 5–8)
PLATELET # BLD: 106 K/CU MM (ref 140–440)
PMV BLD AUTO: 9.8 FL (ref 7.5–11.1)
PO2, VEN: 62 MMHG (ref 28–48)
POTASSIUM SERPL-SCNC: 4 MMOL/L (ref 3.5–5.1)
PRO-BNP: 140.1 PG/ML
PROTEIN UA: NEGATIVE MG/DL
RBC # BLD: 3.59 M/CU MM (ref 4.6–6.2)
RBC URINE: 1 /HPF (ref 0–3)
SEGMENTED NEUTROPHILS ABSOLUTE COUNT: 2.6 K/CU MM
SEGMENTED NEUTROPHILS RELATIVE PERCENT: 55.4 % (ref 36–66)
SODIUM BLD-SCNC: 137 MMOL/L (ref 135–145)
SPECIFIC GRAVITY UA: 1.01 (ref 1–1.03)
TOTAL IMMATURE NEUTOROPHIL: 0.02 K/CU MM
TOTAL NUCLEATED RBC: 0 K/CU MM
TOTAL PROTEIN: 5.9 GM/DL (ref 6.4–8.2)
TRICHOMONAS: ABNORMAL /HPF
TSH SERPL DL<=0.005 MIU/L-ACNC: 1.11 UIU/ML (ref 0.27–4.2)
UROBILINOGEN, URINE: 0.2 MG/DL (ref 0.2–1)
WBC # BLD: 4.8 K/CU MM (ref 4–10.5)
WBC UA: <1 /HPF (ref 0–2)

## 2023-07-21 PROCEDURE — 93010 ELECTROCARDIOGRAM REPORT: CPT | Performed by: INTERNAL MEDICINE

## 2023-07-21 PROCEDURE — 71045 X-RAY EXAM CHEST 1 VIEW: CPT

## 2023-07-21 PROCEDURE — 6360000002 HC RX W HCPCS: Performed by: STUDENT IN AN ORGANIZED HEALTH CARE EDUCATION/TRAINING PROGRAM

## 2023-07-21 PROCEDURE — 83880 ASSAY OF NATRIURETIC PEPTIDE: CPT

## 2023-07-21 PROCEDURE — 87449 NOS EACH ORGANISM AG IA: CPT

## 2023-07-21 PROCEDURE — 93005 ELECTROCARDIOGRAM TRACING: CPT | Performed by: STUDENT IN AN ORGANIZED HEALTH CARE EDUCATION/TRAINING PROGRAM

## 2023-07-21 PROCEDURE — 95705 EEG W/O VID 2-12 HR UNMNTR: CPT

## 2023-07-21 PROCEDURE — 94761 N-INVAS EAR/PLS OXIMETRY MLT: CPT

## 2023-07-21 PROCEDURE — 2140000000 HC CCU INTERMEDIATE R&B

## 2023-07-21 PROCEDURE — 83605 ASSAY OF LACTIC ACID: CPT

## 2023-07-21 PROCEDURE — 99285 EMERGENCY DEPT VISIT HI MDM: CPT

## 2023-07-21 PROCEDURE — 84443 ASSAY THYROID STIM HORMONE: CPT

## 2023-07-21 PROCEDURE — 87040 BLOOD CULTURE FOR BACTERIA: CPT

## 2023-07-21 PROCEDURE — 70450 CT HEAD/BRAIN W/O DYE: CPT

## 2023-07-21 PROCEDURE — 82805 BLOOD GASES W/O2 SATURATION: CPT

## 2023-07-21 PROCEDURE — 2580000003 HC RX 258: Performed by: STUDENT IN AN ORGANIZED HEALTH CARE EDUCATION/TRAINING PROGRAM

## 2023-07-21 PROCEDURE — 84145 PROCALCITONIN (PCT): CPT

## 2023-07-21 PROCEDURE — 81001 URINALYSIS AUTO W/SCOPE: CPT

## 2023-07-21 PROCEDURE — 95819 EEG AWAKE AND ASLEEP: CPT

## 2023-07-21 PROCEDURE — 85025 COMPLETE CBC W/AUTO DIFF WBC: CPT

## 2023-07-21 PROCEDURE — 87641 MR-STAPH DNA AMP PROBE: CPT

## 2023-07-21 PROCEDURE — 80307 DRUG TEST PRSMV CHEM ANLYZR: CPT

## 2023-07-21 PROCEDURE — 87899 AGENT NOS ASSAY W/OPTIC: CPT

## 2023-07-21 PROCEDURE — 36415 COLL VENOUS BLD VENIPUNCTURE: CPT

## 2023-07-21 PROCEDURE — 87081 CULTURE SCREEN ONLY: CPT

## 2023-07-21 PROCEDURE — 6370000000 HC RX 637 (ALT 250 FOR IP): Performed by: STUDENT IN AN ORGANIZED HEALTH CARE EDUCATION/TRAINING PROGRAM

## 2023-07-21 PROCEDURE — 80053 COMPREHEN METABOLIC PANEL: CPT

## 2023-07-21 RX ORDER — ATORVASTATIN CALCIUM 40 MG/1
40 TABLET, FILM COATED ORAL NIGHTLY
Status: DISCONTINUED | OUTPATIENT
Start: 2023-07-21 | End: 2023-07-23 | Stop reason: HOSPADM

## 2023-07-21 RX ORDER — DICYCLOMINE HYDROCHLORIDE 10 MG/1
10 CAPSULE ORAL 4 TIMES DAILY
COMMUNITY
Start: 2023-06-09

## 2023-07-21 RX ORDER — LEVALBUTEROL TARTRATE 45 UG/1
2 AEROSOL, METERED ORAL 3 TIMES DAILY
COMMUNITY
Start: 2023-06-01

## 2023-07-21 RX ORDER — ACETAMINOPHEN 325 MG/1
650 TABLET ORAL EVERY 6 HOURS PRN
Status: DISCONTINUED | OUTPATIENT
Start: 2023-07-21 | End: 2023-07-23 | Stop reason: HOSPADM

## 2023-07-21 RX ORDER — SODIUM CHLORIDE 0.9 % (FLUSH) 0.9 %
5-40 SYRINGE (ML) INJECTION PRN
Status: DISCONTINUED | OUTPATIENT
Start: 2023-07-21 | End: 2023-07-23 | Stop reason: HOSPADM

## 2023-07-21 RX ORDER — SODIUM CHLORIDE 9 MG/ML
INJECTION, SOLUTION INTRAVENOUS PRN
Status: DISCONTINUED | OUTPATIENT
Start: 2023-07-21 | End: 2023-07-23 | Stop reason: HOSPADM

## 2023-07-21 RX ORDER — ONDANSETRON 2 MG/ML
4 INJECTION INTRAMUSCULAR; INTRAVENOUS EVERY 6 HOURS PRN
Status: DISCONTINUED | OUTPATIENT
Start: 2023-07-21 | End: 2023-07-23 | Stop reason: HOSPADM

## 2023-07-21 RX ORDER — POLYETHYLENE GLYCOL 3350 17 G/17G
17 POWDER, FOR SOLUTION ORAL DAILY PRN
Status: DISCONTINUED | OUTPATIENT
Start: 2023-07-21 | End: 2023-07-23 | Stop reason: HOSPADM

## 2023-07-21 RX ORDER — PANTOPRAZOLE SODIUM 40 MG/1
40 TABLET, DELAYED RELEASE ORAL 2 TIMES DAILY
Status: DISCONTINUED | OUTPATIENT
Start: 2023-07-21 | End: 2023-07-23 | Stop reason: HOSPADM

## 2023-07-21 RX ORDER — LATANOPROST 50 UG/ML
1 SOLUTION/ DROPS OPHTHALMIC NIGHTLY
Status: DISCONTINUED | OUTPATIENT
Start: 2023-07-21 | End: 2023-07-23 | Stop reason: HOSPADM

## 2023-07-21 RX ORDER — BUDESONIDE AND FORMOTEROL FUMARATE DIHYDRATE 80; 4.5 UG/1; UG/1
2 AEROSOL RESPIRATORY (INHALATION)
Status: DISCONTINUED | OUTPATIENT
Start: 2023-07-21 | End: 2023-07-23 | Stop reason: HOSPADM

## 2023-07-21 RX ORDER — ASPIRIN 81 MG/1
81 TABLET, CHEWABLE ORAL DAILY
Status: DISCONTINUED | OUTPATIENT
Start: 2023-07-22 | End: 2023-07-23 | Stop reason: HOSPADM

## 2023-07-21 RX ORDER — TRAZODONE HYDROCHLORIDE 50 MG/1
100 TABLET ORAL NIGHTLY
Status: DISCONTINUED | OUTPATIENT
Start: 2023-07-21 | End: 2023-07-23 | Stop reason: HOSPADM

## 2023-07-21 RX ORDER — ONDANSETRON 4 MG/1
4 TABLET, ORALLY DISINTEGRATING ORAL EVERY 8 HOURS PRN
Status: DISCONTINUED | OUTPATIENT
Start: 2023-07-21 | End: 2023-07-23 | Stop reason: HOSPADM

## 2023-07-21 RX ORDER — SODIUM CHLORIDE 0.9 % (FLUSH) 0.9 %
5-40 SYRINGE (ML) INJECTION EVERY 12 HOURS SCHEDULED
Status: DISCONTINUED | OUTPATIENT
Start: 2023-07-21 | End: 2023-07-23 | Stop reason: HOSPADM

## 2023-07-21 RX ORDER — POTASSIUM CHLORIDE 20MEQ/15ML
20 LIQUID (ML) ORAL DAILY
COMMUNITY

## 2023-07-21 RX ORDER — MONTELUKAST SODIUM 10 MG/1
10 TABLET ORAL NIGHTLY
Status: DISCONTINUED | OUTPATIENT
Start: 2023-07-21 | End: 2023-07-23 | Stop reason: HOSPADM

## 2023-07-21 RX ORDER — SODIUM CHLORIDE, SODIUM LACTATE, POTASSIUM CHLORIDE, CALCIUM CHLORIDE 600; 310; 30; 20 MG/100ML; MG/100ML; MG/100ML; MG/100ML
INJECTION, SOLUTION INTRAVENOUS CONTINUOUS
Status: DISCONTINUED | OUTPATIENT
Start: 2023-07-21 | End: 2023-07-22

## 2023-07-21 RX ORDER — ACETAMINOPHEN 650 MG/1
650 SUPPOSITORY RECTAL EVERY 6 HOURS PRN
Status: DISCONTINUED | OUTPATIENT
Start: 2023-07-21 | End: 2023-07-23 | Stop reason: HOSPADM

## 2023-07-21 RX ORDER — ALBUTEROL SULFATE 2.5 MG/3ML
2.5 SOLUTION RESPIRATORY (INHALATION) EVERY 6 HOURS PRN
Status: DISCONTINUED | OUTPATIENT
Start: 2023-07-21 | End: 2023-07-23 | Stop reason: HOSPADM

## 2023-07-21 RX ORDER — MULTIVIT-MIN/IRON/FOLIC ACID/K 18-600-40
2000 CAPSULE ORAL DAILY
COMMUNITY

## 2023-07-21 RX ORDER — TRAZODONE HYDROCHLORIDE 50 MG/1
100 TABLET ORAL NIGHTLY
COMMUNITY
Start: 2023-03-07

## 2023-07-21 RX ADMIN — CEFTRIAXONE SODIUM 1000 MG: 1 INJECTION, POWDER, FOR SOLUTION INTRAMUSCULAR; INTRAVENOUS at 14:25

## 2023-07-21 RX ADMIN — APIXABAN 2.5 MG: 2.5 TABLET, FILM COATED ORAL at 20:28

## 2023-07-21 RX ADMIN — MONTELUKAST 10 MG: 10 TABLET, FILM COATED ORAL at 20:28

## 2023-07-21 RX ADMIN — VANCOMYCIN HYDROCHLORIDE 1250 MG: 1.25 INJECTION, POWDER, LYOPHILIZED, FOR SOLUTION INTRAVENOUS at 18:33

## 2023-07-21 RX ADMIN — SODIUM CHLORIDE, PRESERVATIVE FREE 10 ML: 5 INJECTION INTRAVENOUS at 20:29

## 2023-07-21 RX ADMIN — ATORVASTATIN CALCIUM 40 MG: 40 TABLET, FILM COATED ORAL at 20:28

## 2023-07-21 RX ADMIN — CARBIDOPA AND LEVODOPA 1 TABLET: 10; 100 TABLET ORAL at 21:58

## 2023-07-21 RX ADMIN — AZITHROMYCIN MONOHYDRATE 500 MG: 500 INJECTION, POWDER, LYOPHILIZED, FOR SOLUTION INTRAVENOUS at 14:24

## 2023-07-21 RX ADMIN — SODIUM CHLORIDE, POTASSIUM CHLORIDE, SODIUM LACTATE AND CALCIUM CHLORIDE: 600; 310; 30; 20 INJECTION, SOLUTION INTRAVENOUS at 16:22

## 2023-07-21 RX ADMIN — CEFEPIME 2000 MG: 2 INJECTION, POWDER, FOR SOLUTION INTRAVENOUS at 16:29

## 2023-07-21 RX ADMIN — PANTOPRAZOLE SODIUM 40 MG: 40 TABLET, DELAYED RELEASE ORAL at 20:28

## 2023-07-21 ASSESSMENT — PAIN SCALES - WONG BAKER: WONGBAKER_NUMERICALRESPONSE: 2

## 2023-07-21 NOTE — ED PROVIDER NOTES
Emergency Department Encounter    Patient: Ulises Crockett MRN: [id number]  : 1940  Date of Evaluation: 2023  ED Provider:  Adriel Herrera MD    Triage Chief Complaint:   Altered Mental Status (Patient was at physical therapy riding a bike when he became unresponsive. Patient is semi responsive upon arrival. Patient reported to EMS that he got a headache and was numb bilaterally before he became unresponsive. )    [place]:  Ulises Crockett is a 78 y.o. male with complex past medical history including A-fib on Eliquis, COPD, skin cancer of the scalp status post resection, thoracic aortic aneurysm that presents with altered mental status. According to EMS patient was at physical therapy when he reported headache, bilateral numbness in his upper extremities, and then he became unresponsive. On presentation patient sponsored to some commands, however he is alert and oriented x1. He denies any pain, fall. According to his wife patient was in his usual state of health this morning, he has not been complaining of any fevers, chest pain, abdominal pain, throwing up. ROS - see HPI    Past Medical History:   Diagnosis Date    A-fib ([address]) 2014    Initial consult    Acute bronchospasm 2016    Acute exacerbation of chronic obstructive pulmonary disease (COPD) ([address]) 2018    Asthma     Atrial fibrillation ([address])     Blood glucose elevated 2013    OO=643    Cancer ([address]) dx     prostrate- tx surg only/ tx for skin cancer of head now    Chest pain 2014    COPD, mild ([address]) 2017    patient states he no longer has copd. Diastolic CHF ([address])     Edema     Family history of diabetes mellitus     Brother    Fatigue 2014    Glaucoma     \"watching this now\" Dr Kassy Schofield    H/O cardiovascular stress test 2014EF>55% Atrial septal occluder device noted.  No evidence of hemodynamically significant coronary artery disease    H/O cardiovascular stress test

## 2023-07-21 NOTE — H&P
V2.0  History and Physical      Name:  Jean Mays. /Age/Sex: 1940  (78 y.o. male)   MRN & CSN:  [id number] & [id number] Encounter Date/Time: 2023 2:03 PM EDT   Location:  [place] [id number] PCP: Iqra Polk Day: 1    History from:     patient    History of Present Illness:     Chief Complaint: AMS (altered mental status)    Jean Cornell is a 78 y.o. male with pmh of COPD, hypertension, Parkinson's disease, and prior CVA who presents with altered mentation after physical therapy today. Patient reported a headache and some numbness and tingling in his both lower extremities. Patient then lethargic and able to respond and follow commands, but he is very slow and sleepy and not verbal.  Patient's wife present and states that this has happened before a couple years ago and he came out of it without identifying a specific trigger. Patient had a prior CVA back in . My thought is this may be seizure activity related to his prior CVA. Does not appear to have any focal motor deficits on my exam.  ED physician does agree. Work-up did come back to show what looks like a lower lobe pneumonia and he was initiated on broad-spectrum antibiotics. The patient's wife feels that he might of overdone it and overexerted himself with therapy today. Assessment and Plan:   Jean Cornell is a 78 y.o. male with a pmh of COPD, hypertension, Parkinson's disease, and prior CVA who presents with AMS (altered mental status)    Altered mentation  We will rule out seizure and postictal state as well as CVA  Patient with no CVA. Had change in mentation after working with physio therapy today. Started after headache and some reported tingling in his extremities. CT head okay in the ED.   Patient is at risk for seizure disorder as he would have a focus for at the site of his old stroke  MRI brain ordered  We will order cerebellar EEG  Consult placed to neurology  Seizure precautions  I will

## 2023-07-21 NOTE — CARE COORDINATION
MCG criteria for Syncope and collapse reviewed at this time, criteria supports Inpatient Admission. MIRIAM,RN/CM

## 2023-07-22 ENCOUNTER — APPOINTMENT (OUTPATIENT)
Dept: MRI IMAGING | Age: 83
DRG: 056 | End: 2023-07-22
Payer: MEDICARE

## 2023-07-22 LAB
ALBUMIN SERPL-MCNC: 3.9 GM/DL (ref 3.4–5)
ALP BLD-CCNC: 67 IU/L (ref 40–128)
ALT SERPL-CCNC: 27 U/L (ref 10–40)
ANION GAP SERPL CALCULATED.3IONS-SCNC: 7 MMOL/L (ref 4–16)
ANION GAP SERPL CALCULATED.3IONS-SCNC: 9 MMOL/L (ref 4–16)
AST SERPL-CCNC: 28 IU/L (ref 15–37)
BASOPHILS ABSOLUTE: 0.1 K/CU MM
BASOPHILS RELATIVE PERCENT: 0.8 % (ref 0–1)
BILIRUB SERPL-MCNC: 0.6 MG/DL (ref 0–1)
BUN SERPL-MCNC: 15 MG/DL (ref 6–23)
BUN SERPL-MCNC: 16 MG/DL (ref 6–23)
CALCIUM SERPL-MCNC: 8.4 MG/DL (ref 8.3–10.6)
CALCIUM SERPL-MCNC: 9 MG/DL (ref 8.3–10.6)
CHLORIDE BLD-SCNC: 103 MMOL/L (ref 99–110)
CHLORIDE BLD-SCNC: 106 MMOL/L (ref 99–110)
CO2: 25 MMOL/L (ref 21–32)
CO2: 27 MMOL/L (ref 21–32)
CREAT SERPL-MCNC: 0.9 MG/DL (ref 0.9–1.3)
CREAT SERPL-MCNC: 1 MG/DL (ref 0.9–1.3)
DIFFERENTIAL TYPE: ABNORMAL
EOSINOPHILS ABSOLUTE: 0.3 K/CU MM
EOSINOPHILS RELATIVE PERCENT: 4.4 % (ref 0–3)
GFR SERPL CREATININE-BSD FRML MDRD: >60 ML/MIN/1.73M2
GFR SERPL CREATININE-BSD FRML MDRD: >60 ML/MIN/1.73M2
GLUCOSE BLD-MCNC: 82 MG/DL (ref 70–99)
GLUCOSE SERPL-MCNC: 103 MG/DL (ref 70–99)
GLUCOSE SERPL-MCNC: 99 MG/DL (ref 70–99)
HCT VFR BLD CALC: 43 % (ref 42–52)
HEMOGLOBIN: 13.9 GM/DL (ref 13.5–18)
IMMATURE NEUTROPHIL %: 0.3 % (ref 0–0.43)
L PNEUMO AG UR QL IA: NEGATIVE
LYMPHOCYTES ABSOLUTE: 1.4 K/CU MM
LYMPHOCYTES RELATIVE PERCENT: 22.1 % (ref 24–44)
MCH RBC QN AUTO: 29.6 PG (ref 27–31)
MCHC RBC AUTO-ENTMCNC: 32.3 % (ref 32–36)
MCV RBC AUTO: 91.7 FL (ref 78–100)
MONOCYTES ABSOLUTE: 0.7 K/CU MM
MONOCYTES RELATIVE PERCENT: 11 % (ref 0–4)
MRSA, DNA, NASAL: NEGATIVE
NUCLEATED RBC %: 0 %
PDW BLD-RTO: 13.1 % (ref 11.7–14.9)
PLATELET # BLD: 143 K/CU MM (ref 140–440)
PMV BLD AUTO: 9.6 FL (ref 7.5–11.1)
POTASSIUM SERPL-SCNC: 3.7 MMOL/L (ref 3.5–5.1)
POTASSIUM SERPL-SCNC: 4.3 MMOL/L (ref 3.5–5.1)
PROCALCITONIN SERPL-MCNC: 0.03 NG/ML
RBC # BLD: 4.69 M/CU MM (ref 4.6–6.2)
S PNEUM AG CSF QL: NORMAL
SEGMENTED NEUTROPHILS ABSOLUTE COUNT: 3.9 K/CU MM
SEGMENTED NEUTROPHILS RELATIVE PERCENT: 61.4 % (ref 36–66)
SODIUM BLD-SCNC: 138 MMOL/L (ref 135–145)
SODIUM BLD-SCNC: 139 MMOL/L (ref 135–145)
SPECIMEN DESCRIPTION: NORMAL
TOTAL IMMATURE NEUTOROPHIL: 0.02 K/CU MM
TOTAL NUCLEATED RBC: 0 K/CU MM
TOTAL PROTEIN: 5.8 GM/DL (ref 6.4–8.2)
WBC # BLD: 6.4 K/CU MM (ref 4–10.5)

## 2023-07-22 PROCEDURE — 2140000000 HC CCU INTERMEDIATE R&B

## 2023-07-22 PROCEDURE — 99223 1ST HOSP IP/OBS HIGH 75: CPT | Performed by: STUDENT IN AN ORGANIZED HEALTH CARE EDUCATION/TRAINING PROGRAM

## 2023-07-22 PROCEDURE — 97535 SELF CARE MNGMENT TRAINING: CPT

## 2023-07-22 PROCEDURE — 6370000000 HC RX 637 (ALT 250 FOR IP): Performed by: NURSE PRACTITIONER

## 2023-07-22 PROCEDURE — 36415 COLL VENOUS BLD VENIPUNCTURE: CPT

## 2023-07-22 PROCEDURE — 6370000000 HC RX 637 (ALT 250 FOR IP): Performed by: STUDENT IN AN ORGANIZED HEALTH CARE EDUCATION/TRAINING PROGRAM

## 2023-07-22 PROCEDURE — 70551 MRI BRAIN STEM W/O DYE: CPT

## 2023-07-22 PROCEDURE — 6360000002 HC RX W HCPCS: Performed by: STUDENT IN AN ORGANIZED HEALTH CARE EDUCATION/TRAINING PROGRAM

## 2023-07-22 PROCEDURE — 97166 OT EVAL MOD COMPLEX 45 MIN: CPT

## 2023-07-22 PROCEDURE — 80053 COMPREHEN METABOLIC PANEL: CPT

## 2023-07-22 PROCEDURE — 94640 AIRWAY INHALATION TREATMENT: CPT

## 2023-07-22 PROCEDURE — 97163 PT EVAL HIGH COMPLEX 45 MIN: CPT

## 2023-07-22 PROCEDURE — 85025 COMPLETE CBC W/AUTO DIFF WBC: CPT

## 2023-07-22 PROCEDURE — 97530 THERAPEUTIC ACTIVITIES: CPT

## 2023-07-22 PROCEDURE — 2580000003 HC RX 258: Performed by: STUDENT IN AN ORGANIZED HEALTH CARE EDUCATION/TRAINING PROGRAM

## 2023-07-22 PROCEDURE — 97116 GAIT TRAINING THERAPY: CPT

## 2023-07-22 PROCEDURE — 82962 GLUCOSE BLOOD TEST: CPT

## 2023-07-22 PROCEDURE — 80048 BASIC METABOLIC PNL TOTAL CA: CPT

## 2023-07-22 RX ADMIN — APIXABAN 2.5 MG: 2.5 TABLET, FILM COATED ORAL at 22:02

## 2023-07-22 RX ADMIN — ONDANSETRON 4 MG: 2 INJECTION INTRAMUSCULAR; INTRAVENOUS at 11:37

## 2023-07-22 RX ADMIN — CEFEPIME 2000 MG: 2 INJECTION, POWDER, FOR SOLUTION INTRAVENOUS at 04:19

## 2023-07-22 RX ADMIN — SODIUM CHLORIDE, POTASSIUM CHLORIDE, SODIUM LACTATE AND CALCIUM CHLORIDE: 600; 310; 30; 20 INJECTION, SOLUTION INTRAVENOUS at 04:22

## 2023-07-22 RX ADMIN — SODIUM CHLORIDE, PRESERVATIVE FREE 10 ML: 5 INJECTION INTRAVENOUS at 08:18

## 2023-07-22 RX ADMIN — ATORVASTATIN CALCIUM 40 MG: 40 TABLET, FILM COATED ORAL at 22:02

## 2023-07-22 RX ADMIN — ASPIRIN 81 MG: 81 TABLET, CHEWABLE ORAL at 08:18

## 2023-07-22 RX ADMIN — APIXABAN 2.5 MG: 2.5 TABLET, FILM COATED ORAL at 08:18

## 2023-07-22 RX ADMIN — LATANOPROST 1 DROP: 50 SOLUTION OPHTHALMIC at 23:06

## 2023-07-22 RX ADMIN — CARBIDOPA AND LEVODOPA 1 TABLET: 10; 100 TABLET ORAL at 08:19

## 2023-07-22 RX ADMIN — BUDESONIDE AND FORMOTEROL FUMARATE DIHYDRATE 2 PUFF: 80; 4.5 AEROSOL RESPIRATORY (INHALATION) at 20:26

## 2023-07-22 RX ADMIN — BUDESONIDE AND FORMOTEROL FUMARATE DIHYDRATE 2 PUFF: 80; 4.5 AEROSOL RESPIRATORY (INHALATION) at 09:59

## 2023-07-22 RX ADMIN — MONTELUKAST 10 MG: 10 TABLET, FILM COATED ORAL at 22:02

## 2023-07-22 RX ADMIN — TRAZODONE HYDROCHLORIDE 100 MG: 50 TABLET ORAL at 00:02

## 2023-07-22 RX ADMIN — PANTOPRAZOLE SODIUM 40 MG: 40 TABLET, DELAYED RELEASE ORAL at 22:02

## 2023-07-22 RX ADMIN — PANTOPRAZOLE SODIUM 40 MG: 40 TABLET, DELAYED RELEASE ORAL at 08:18

## 2023-07-22 RX ADMIN — LATANOPROST 1 DROP: 50 SOLUTION OPHTHALMIC at 00:02

## 2023-07-22 NOTE — CONSULTS
Dr Capps Dears    H/O cardiovascular stress test 04/14/2014 5/14EF>55% Atrial septal occluder device noted. 4/14No evidence of hemodynamically significant coronary artery disease    H/O cardiovascular stress test 01/14/2016    lexiscan-normal,EF70%    H/O echocardiogram 02/12/2015    EF 60% Mildly hypertrophied LV and LV systolic function. Mild MR & TR. Atrial septal occluder device is seen with is fuctioning normally. H/O echocardiogram 01/14/2016    EF 60% aaortic root mildly dilated with dimension of 4.3    H/O transesophageal echocardiography (HARLAN) for monitoring 05/22/2014    normal septal occluder     Hiatal hernia     planning to see Dr Amanda Carlin about this    History of 24 hour EKG monitoring 05/10/2014    Sinus rhythm. History of cardiovascular stress test 12/16/2013 12/2013-([facility]Probably normal Regadenoson/Exercise with Tc-99 sestamibi. No ischemia noted. Small fixed defect in anteroseptuma and a second small fixed defect in inferolateral wall, most consistent with attenuation artifact. Normal LV size. Global LVSF normal and normal wall motion; History of echocardiogram 11/11/2013 11/2013-([facility])-Interatrial septal occluder device is visualized. Normal biventricular systolic function; LVEF - 60-65%. Mild degenerative valvular changes; Mild AI, Mild MR, trace TR, trace PI. No pericardial effusion; History of heart surgery 11/2013    PFO closure by Amplatzer 25mm Cribriform device to intra-atrial septum-->Dr.Steven BISWAS(@ [place])    History of kidney stones     tx with surgery for this in the past    History of nuclear stress test 02/16/2015    EF 70%. WNL    Hx of Doppler ultrasound 05/07/2014    Carotid- right and left normal    Hx of Doppler ultrasound 06/30/2014    Right groin US: Negative for pseudoaneurysm findings.     Hx of echocardiogram 03/31/2014    mild mitral and tricusid regurg, mildly hypertrophied left ventricle    Hx of motion sickness

## 2023-07-23 VITALS
RESPIRATION RATE: 15 BRPM | BODY MASS INDEX: 29.96 KG/M2 | HEIGHT: 64 IN | OXYGEN SATURATION: 94 % | TEMPERATURE: 97.6 F | WEIGHT: 175.49 LBS | HEART RATE: 71 BPM | DIASTOLIC BLOOD PRESSURE: 92 MMHG | SYSTOLIC BLOOD PRESSURE: 121 MMHG

## 2023-07-23 LAB
ANION GAP SERPL CALCULATED.3IONS-SCNC: 9 MMOL/L (ref 4–16)
BASOPHILS ABSOLUTE: 0.1 K/CU MM
BASOPHILS RELATIVE PERCENT: 0.8 % (ref 0–1)
BUN SERPL-MCNC: 16 MG/DL (ref 6–23)
CALCIUM SERPL-MCNC: 8.6 MG/DL (ref 8.3–10.6)
CHLORIDE BLD-SCNC: 104 MMOL/L (ref 99–110)
CO2: 24 MMOL/L (ref 21–32)
CREAT SERPL-MCNC: 1 MG/DL (ref 0.9–1.3)
DIFFERENTIAL TYPE: ABNORMAL
DOSE AMOUNT: NORMAL
DOSE TIME: NORMAL
EOSINOPHILS ABSOLUTE: 0.4 K/CU MM
EOSINOPHILS RELATIVE PERCENT: 6.2 % (ref 0–3)
GFR SERPL CREATININE-BSD FRML MDRD: >60 ML/MIN/1.73M2
GLUCOSE SERPL-MCNC: 99 MG/DL (ref 70–99)
HCT VFR BLD CALC: 45 % (ref 42–52)
HEMOGLOBIN: 14.3 GM/DL (ref 13.5–18)
IMMATURE NEUTROPHIL %: 0.3 % (ref 0–0.43)
LYMPHOCYTES ABSOLUTE: 1.7 K/CU MM
LYMPHOCYTES RELATIVE PERCENT: 26.1 % (ref 24–44)
MCH RBC QN AUTO: 29.7 PG (ref 27–31)
MCHC RBC AUTO-ENTMCNC: 31.8 % (ref 32–36)
MCV RBC AUTO: 93.6 FL (ref 78–100)
MONOCYTES ABSOLUTE: 0.7 K/CU MM
MONOCYTES RELATIVE PERCENT: 10.8 % (ref 0–4)
NUCLEATED RBC %: 0 %
PDW BLD-RTO: 13.2 % (ref 11.7–14.9)
PLATELET # BLD: 143 K/CU MM (ref 140–440)
PMV BLD AUTO: 10.1 FL (ref 7.5–11.1)
POTASSIUM SERPL-SCNC: 4.4 MMOL/L (ref 3.5–5.1)
RBC # BLD: 4.81 M/CU MM (ref 4.6–6.2)
SEGMENTED NEUTROPHILS ABSOLUTE COUNT: 3.5 K/CU MM
SEGMENTED NEUTROPHILS RELATIVE PERCENT: 55.8 % (ref 36–66)
SODIUM BLD-SCNC: 137 MMOL/L (ref 135–145)
TOTAL IMMATURE NEUTOROPHIL: 0.02 K/CU MM
TOTAL NUCLEATED RBC: 0 K/CU MM
VANCOMYCIN RANDOM: <4 UG/ML
WBC # BLD: 6.3 K/CU MM (ref 4–10.5)

## 2023-07-23 PROCEDURE — 95717 EEG PHYS/QHP 2-12 HR W/O VID: CPT | Performed by: STUDENT IN AN ORGANIZED HEALTH CARE EDUCATION/TRAINING PROGRAM

## 2023-07-23 PROCEDURE — 80048 BASIC METABOLIC PNL TOTAL CA: CPT

## 2023-07-23 PROCEDURE — 2580000003 HC RX 258: Performed by: STUDENT IN AN ORGANIZED HEALTH CARE EDUCATION/TRAINING PROGRAM

## 2023-07-23 PROCEDURE — 85025 COMPLETE CBC W/AUTO DIFF WBC: CPT

## 2023-07-23 PROCEDURE — 6370000000 HC RX 637 (ALT 250 FOR IP): Performed by: STUDENT IN AN ORGANIZED HEALTH CARE EDUCATION/TRAINING PROGRAM

## 2023-07-23 PROCEDURE — 94761 N-INVAS EAR/PLS OXIMETRY MLT: CPT

## 2023-07-23 PROCEDURE — 80202 ASSAY OF VANCOMYCIN: CPT

## 2023-07-23 PROCEDURE — 36415 COLL VENOUS BLD VENIPUNCTURE: CPT

## 2023-07-23 PROCEDURE — 99233 SBSQ HOSP IP/OBS HIGH 50: CPT | Performed by: NURSE PRACTITIONER

## 2023-07-23 PROCEDURE — 94640 AIRWAY INHALATION TREATMENT: CPT

## 2023-07-23 PROCEDURE — 6370000000 HC RX 637 (ALT 250 FOR IP): Performed by: NURSE PRACTITIONER

## 2023-07-23 RX ADMIN — TRAZODONE HYDROCHLORIDE 100 MG: 50 TABLET ORAL at 00:39

## 2023-07-23 RX ADMIN — ASPIRIN 81 MG: 81 TABLET, CHEWABLE ORAL at 08:51

## 2023-07-23 RX ADMIN — BUDESONIDE AND FORMOTEROL FUMARATE DIHYDRATE 2 PUFF: 80; 4.5 AEROSOL RESPIRATORY (INHALATION) at 08:59

## 2023-07-23 RX ADMIN — PANTOPRAZOLE SODIUM 40 MG: 40 TABLET, DELAYED RELEASE ORAL at 08:51

## 2023-07-23 RX ADMIN — SODIUM CHLORIDE, PRESERVATIVE FREE 10 ML: 5 INJECTION INTRAVENOUS at 08:51

## 2023-07-23 RX ADMIN — APIXABAN 2.5 MG: 2.5 TABLET, FILM COATED ORAL at 08:51

## 2023-07-23 NOTE — DISCHARGE INSTRUCTIONS
Please followup with Dr. Justus Mohs  Stop taking sinemet (levodopa-carvidopa)  Call cardiology you need a longer heart monitor and repeat tilt table test   Move slowly, go from sitting to standing slowly and wear compression stockings

## 2023-07-23 NOTE — DISCHARGE SUMMARY
V2.0  Discharge Summary    Name:  Toshia Vale /Age/Sex: 1940 (78 y.o. male)   Admit Date: 2023  Discharge Date: 23    MRN & CSN:  [id number] & [id number] Encounter Date and Time 23 1:06 PM EDT    Attending:  No att. providers found Discharging Provider: Samanta Perez MD       Hospital Course:     Brief HPI: Toshia Vale is a 78 y.o. male with pmh of COPD, hypertension, Parkinson's disease, and prior CVA who presents with altered mentation after physical therapy today. Patient reported a headache and some numbness and tingling in his both lower extremities. Patient then lethargic and able to respond and follow commands, but he is very slow and sleepy and not verbal.  Patient's wife present and states that this has happened before a couple years ago and he came out of it without identifying a specific trigger. Patient had a prior CVA back in . My thought is this may be seizure activity related to his prior CVA. Does not appear to have any focal motor deficits on my exam.    Brief Problem Based Course: Altered mentation   Had change in mentation after working with physio therapy today. Started after headache and some reported tingling in his extremities. CT head \negative for ICH. Patient is at risk for seizure disorder as he would have a focus for at the site of his old stroke  Patient had a event that was witnessed by myself and neurology team.  Patient was complaining of numbness in his lower extremities. Was minimally verbal, anxious but was awake but is able to project when we were talking about something and he corrected us. Episode lasted a few minutes and he went back to his baseline. MRI brain without new stroke. Shows remote left MCA infarct. Neurology consultation obtained. Low suspicion of Seizure precautions. Plan for follow-up outpatient. Discussed with wife.   He may have a component of anxiety and these episodes may be representative of

## 2023-07-24 PROBLEM — G40.89 OTHER SEIZURES (HCC): Status: ACTIVE | Noted: 2023-07-24

## 2023-07-24 NOTE — PROCEDURES
CONTINUOUS ELECTROENCEPHALOGRAM (cEEG) REPORT    Identifying Information:  Name: Enmanuel Gooden MRN: [id number]  : 1940  Interpreting Physician: Shaun Emanuel DO  Referring Provider: Liberty Goldberg      Clinical History:  Enmanuel Gooden is an 78 y.o. male with concerns for seizure like activity. Past Medical History:  Past Medical History:   Diagnosis Date    A-fib ([address]) 2014    Initial consult    Acute bronchospasm 2016    Acute exacerbation of chronic obstructive pulmonary disease (COPD) ([address]) 2018    Asthma     Atrial fibrillation ([address])     Blood glucose elevated 2013    FZ=599    Cancer ([address]) dx     prostrate- tx surg only/ tx for skin cancer of head now    Chest pain 2014    COPD, mild ([address]) 2017    patient states he no longer has copd. Diastolic CHF ([address])     Edema     Family history of diabetes mellitus     Brother    Fatigue 2014    Glaucoma     \"watching this now\" Dr Zoie Tripp    H/O cardiovascular stress test 2014EF>55% Atrial septal occluder device noted. No evidence of hemodynamically significant coronary artery disease    H/O cardiovascular stress test 2016    lexiscan-normal,EF70%    H/O echocardiogram 2015    EF 60% Mildly hypertrophied LV and LV systolic function. Mild MR & TR. Atrial septal occluder device is seen with is fuctioning normally. H/O echocardiogram 2016    EF 60% aaortic root mildly dilated with dimension of 4.3    H/O transesophageal echocardiography (HARLAN) for monitoring 2014    normal septal occluder     Hiatal hernia     planning to see Dr Roxanne Brenner about this    History of 24 hour EKG monitoring 05/10/2014    Sinus rhythm. History of cardiovascular stress test 2013-([facility])-Probably normal Regadenoson/Exercise with Tc-99 sestamibi. No ischemia noted.  Small fixed defect in anteroseptuma and a second small fixed defect in inferolateral wall, most

## 2023-07-25 ENCOUNTER — TELEPHONE (OUTPATIENT)
Dept: NEUROLOGY | Age: 83
End: 2023-07-25

## 2023-07-25 NOTE — TELEPHONE ENCOUNTER
Discussed with Dr. Chioma Peña. Pt has seen Dr. Charley Smith at [facility] recently and has not seen Dr. Chioma Peña for several years. Pt consulted at [facility] 7/22/23 with no definitive dx. Would you prefer pt schedule with you for hospital follow up or ok to see Ryan Vargas? Please advise.

## 2023-07-25 NOTE — TELEPHONE ENCOUNTER
----- Message from IFTIKHAR Arredondo CNP sent at 7/23/2023 10:48 AM EDT -----  Regarding: Hospital Follow up  Patient needs follow up from hospital.     Bea Armendariz, guanaco and I diagnosed this man with seizures back in 2018, read our hospital note. Patient unfortunately couldn't handle Keppra or Vimpat and didn't want to be on any AED, no seizure in 2 years since being off medications, however recently he has been having episodes where he will be doing something for exertional than his normal slow pace, for Instant at PT he was on the exercise bike (episode that brought him in here), he gets acutely light headed, pale, fuzzy in the brain and generalized numbness and tingling in arms and legs has to lay down then goes into like a twilight stage, then comes out of it. He had an episode coming out of MRI machine as well, that I witnessed. Its not seizure, he has had an ambulatory way back with Dr. Katherine Mcfarlane, I was considering that but I think its low yield, I think he has a dysautonomic issue going on and cardiac issue and he is going to work with cardiology, he has severe BLE PN as well, but I dont want to miss a neurodegenerative process as well. He has been ruled out for PD TWICE by Aneesh and Catalina due to tremor, so that thought has been out there in the universe, so much that his PCP Dr. Jessica Williamson actually ignored the two previous extensive work ups by two neurologist and still put him on Sinemet, but I d/c'd that on this admission. So at any rate, long explanation to say lets see him for his neuropathy, do any autonomic we can do and then make sure he doesn't have a weird MSA. THANK YOU!

## 2023-07-26 LAB
CULTURE: NORMAL
Lab: NORMAL
SPECIMEN: NORMAL

## 2023-07-31 NOTE — TELEPHONE ENCOUNTER
Discussed with Dr. Russell Mendota. Pt needs to continue to follow with Dr. Adonica Jeans and this was explained to pt during hospitalization.

## 2023-07-31 NOTE — TELEPHONE ENCOUNTER
Called pt regarding information per previous msg. Pt handed the phone to a woman to discuss for him. Explained situation and she voiced understanding.

## 2024-01-23 ENCOUNTER — INPATIENT HOSPITAL (OUTPATIENT)
Dept: URBAN - METROPOLITAN AREA HOSPITAL 56 | Facility: HOSPITAL | Age: 84
End: 2024-01-23
Payer: MEDICARE

## 2024-01-23 DIAGNOSIS — K21.9 GASTRO-ESOPHAGEAL REFLUX DISEASE WITHOUT ESOPHAGITIS: ICD-10-CM

## 2024-01-23 DIAGNOSIS — R93.3 ABNORMAL FINDINGS ON DIAGNOSTIC IMAGING OF OTHER PARTS OF DI: ICD-10-CM

## 2024-01-23 DIAGNOSIS — K59.00 CONSTIPATION, UNSPECIFIED: ICD-10-CM

## 2024-01-23 DIAGNOSIS — K57.30 DIVERTICULOSIS OF LARGE INTESTINE WITHOUT PERFORATION OR ABS: ICD-10-CM

## 2024-01-23 PROCEDURE — 99222 1ST HOSP IP/OBS MODERATE 55: CPT | Mod: FS | Performed by: PHYSICIAN ASSISTANT

## 2024-03-04 ENCOUNTER — INPATIENT HOSPITAL (OUTPATIENT)
Dept: URBAN - METROPOLITAN AREA HOSPITAL 104 | Facility: HOSPITAL | Age: 84
End: 2024-03-04
Payer: MEDICARE

## 2024-03-04 DIAGNOSIS — K21.9 GASTRO-ESOPHAGEAL REFLUX DISEASE WITHOUT ESOPHAGITIS: ICD-10-CM

## 2024-03-04 DIAGNOSIS — R13.12 DYSPHAGIA, OROPHARYNGEAL PHASE: ICD-10-CM

## 2024-03-04 DIAGNOSIS — I69.991 DYSPHAGIA FOLLOWING UNSPECIFIED CEREBROVASCULAR DISEASE: ICD-10-CM

## 2024-03-04 DIAGNOSIS — R74.01 ELEVATION OF LEVELS OF LIVER TRANSAMINASE LEVELS: ICD-10-CM

## 2024-03-04 DIAGNOSIS — Z86.16 PERSONAL HISTORY OF COVID-19: ICD-10-CM

## 2024-03-04 PROCEDURE — 99222 1ST HOSP IP/OBS MODERATE 55: CPT | Mod: FS | Performed by: NURSE PRACTITIONER

## 2024-03-05 ENCOUNTER — INPATIENT HOSPITAL (OUTPATIENT)
Dept: URBAN - METROPOLITAN AREA HOSPITAL 104 | Facility: HOSPITAL | Age: 84
End: 2024-03-05
Payer: MEDICARE

## 2024-03-05 DIAGNOSIS — K21.9 GASTRO-ESOPHAGEAL REFLUX DISEASE WITHOUT ESOPHAGITIS: ICD-10-CM

## 2024-03-05 DIAGNOSIS — I69.991 DYSPHAGIA FOLLOWING UNSPECIFIED CEREBROVASCULAR DISEASE: ICD-10-CM

## 2024-03-05 DIAGNOSIS — R74.01 ELEVATION OF LEVELS OF LIVER TRANSAMINASE LEVELS: ICD-10-CM

## 2024-03-05 DIAGNOSIS — K44.9 DIAPHRAGMATIC HERNIA WITHOUT OBSTRUCTION OR GANGRENE: ICD-10-CM

## 2024-03-05 DIAGNOSIS — R13.12 DYSPHAGIA, OROPHARYNGEAL PHASE: ICD-10-CM

## 2024-03-05 DIAGNOSIS — K22.2 ESOPHAGEAL OBSTRUCTION: ICD-10-CM

## 2024-03-05 DIAGNOSIS — Z86.16 PERSONAL HISTORY OF COVID-19: ICD-10-CM

## 2024-03-05 PROCEDURE — 99232 SBSQ HOSP IP/OBS MODERATE 35: CPT | Performed by: INTERNAL MEDICINE

## 2024-03-06 PROCEDURE — 43248 EGD GUIDE WIRE INSERTION: CPT | Performed by: INTERNAL MEDICINE

## 2024-03-13 ENCOUNTER — OFFICE (OUTPATIENT)
Dept: URBAN - METROPOLITAN AREA CLINIC 16 | Facility: CLINIC | Age: 84
End: 2024-03-13

## 2024-03-13 VITALS — WEIGHT: 162 LBS | HEIGHT: 64 IN

## 2024-03-13 DIAGNOSIS — R74.8 ABNORMAL LEVELS OF OTHER SERUM ENZYMES: ICD-10-CM

## 2024-03-13 DIAGNOSIS — K21.9 GASTRO-ESOPHAGEAL REFLUX DISEASE WITHOUT ESOPHAGITIS: ICD-10-CM

## 2024-03-13 DIAGNOSIS — R10.84 GENERALIZED ABDOMINAL PAIN: ICD-10-CM

## 2024-03-13 DIAGNOSIS — R93.5 ABNORMAL FINDINGS ON DIAGNOSTIC IMAGING OF OTHER ABDOMINAL R: ICD-10-CM

## 2024-03-13 DIAGNOSIS — K59.00 CONSTIPATION, UNSPECIFIED: ICD-10-CM

## 2024-03-13 PROCEDURE — 99214 OFFICE O/P EST MOD 30 MIN: CPT | Performed by: INTERNAL MEDICINE

## 2024-03-15 ENCOUNTER — HOSPITAL ENCOUNTER (OUTPATIENT)
Age: 84
Discharge: HOME | End: 2024-03-15
Payer: MEDICARE

## 2024-03-15 LAB
ALBUMIN SERPL-MCNC: 3.6 GM/DL (ref 3.4–5)
ALP BLD-CCNC: 100 IU/L (ref 40–129)
ALT SERPL-CCNC: 75 U/L (ref 10–40)
AST SERPL-CCNC: 45 IU/L (ref 15–37)
BILIRUB SERPL-MCNC: 0.6 MG/DL (ref 0–1)
BILIRUBIN DIRECT: 0.2 MG/DL (ref 0–0.3)
BILIRUBIN, INDIRECT: 0.4 MG/DL (ref 0–0.7)
HAV IGM SERPL QL IA: NON REACTIVE
HBV CORE IGM SERPL QL IA: NON REACTIVE
HBV SURFACE AG SERPL QL IA: NON REACTIVE
HCV AB SERPL QL IA: NON REACTIVE
TOTAL PROTEIN: 5.9 GM/DL (ref 6.4–8.2)

## 2024-03-15 PROCEDURE — 80074 ACUTE HEPATITIS PANEL: CPT

## 2024-03-15 PROCEDURE — 86038 ANTINUCLEAR ANTIBODIES: CPT

## 2024-03-15 PROCEDURE — 36415 COLL VENOUS BLD VENIPUNCTURE: CPT

## 2024-03-15 PROCEDURE — 80076 HEPATIC FUNCTION PANEL: CPT

## 2024-03-15 PROCEDURE — 83516 IMMUNOASSAY NONANTIBODY: CPT

## 2024-03-18 LAB
NUCLEAR IGG SER QL IA: NORMAL
SMA IGG SER-ACNC: 4 UNITS (ref 0–19)

## 2024-04-25 ENCOUNTER — OFFICE (OUTPATIENT)
Dept: URBAN - METROPOLITAN AREA CLINIC 17 | Facility: CLINIC | Age: 84
End: 2024-04-25
Payer: MEDICARE

## 2024-04-25 VITALS
HEIGHT: 64 IN | OXYGEN SATURATION: 98 % | SYSTOLIC BLOOD PRESSURE: 122 MMHG | HEART RATE: 62 BPM | WEIGHT: 169 LBS | DIASTOLIC BLOOD PRESSURE: 72 MMHG

## 2024-04-25 DIAGNOSIS — K21.9 GASTRO-ESOPHAGEAL REFLUX DISEASE WITHOUT ESOPHAGITIS: ICD-10-CM

## 2024-04-25 DIAGNOSIS — K59.00 CONSTIPATION, UNSPECIFIED: ICD-10-CM

## 2024-04-25 DIAGNOSIS — R14.0 ABDOMINAL DISTENSION (GASEOUS): ICD-10-CM

## 2024-04-25 DIAGNOSIS — R74.8 ABNORMAL LEVELS OF OTHER SERUM ENZYMES: ICD-10-CM

## 2024-04-25 DIAGNOSIS — R13.10 DYSPHAGIA, UNSPECIFIED: ICD-10-CM

## 2024-04-25 PROCEDURE — 99214 OFFICE O/P EST MOD 30 MIN: CPT | Performed by: PHYSICIAN ASSISTANT

## 2024-06-18 ENCOUNTER — OFFICE (OUTPATIENT)
Dept: URBAN - METROPOLITAN AREA CLINIC 16 | Facility: CLINIC | Age: 84
End: 2024-06-18

## 2024-06-18 VITALS
WEIGHT: 170 LBS | HEART RATE: 68 BPM | HEIGHT: 64 IN | SYSTOLIC BLOOD PRESSURE: 122 MMHG | DIASTOLIC BLOOD PRESSURE: 68 MMHG

## 2024-06-18 DIAGNOSIS — R14.0 ABDOMINAL DISTENSION (GASEOUS): ICD-10-CM

## 2024-06-18 DIAGNOSIS — R74.8 ABNORMAL LEVELS OF OTHER SERUM ENZYMES: ICD-10-CM

## 2024-06-18 DIAGNOSIS — R17 UNSPECIFIED JAUNDICE: ICD-10-CM

## 2024-06-18 DIAGNOSIS — K21.9 GASTRO-ESOPHAGEAL REFLUX DISEASE WITHOUT ESOPHAGITIS: ICD-10-CM

## 2024-06-18 DIAGNOSIS — R13.10 DYSPHAGIA, UNSPECIFIED: ICD-10-CM

## 2024-06-18 PROCEDURE — 99213 OFFICE O/P EST LOW 20 MIN: CPT

## 2024-06-18 RX ORDER — BISACODYL 10 MG
10 SUPPOSITORY, RECTAL RECTAL
Qty: 14 | Refills: 1 | Status: ACTIVE
Start: 2024-06-18

## 2024-06-27 ENCOUNTER — OFFICE (OUTPATIENT)
Dept: URBAN - METROPOLITAN AREA CLINIC 16 | Facility: CLINIC | Age: 84
End: 2024-06-27

## 2024-06-27 VITALS
HEART RATE: 76 BPM | HEIGHT: 64 IN | DIASTOLIC BLOOD PRESSURE: 68 MMHG | SYSTOLIC BLOOD PRESSURE: 136 MMHG | WEIGHT: 174 LBS

## 2024-06-27 DIAGNOSIS — K21.9 GASTRO-ESOPHAGEAL REFLUX DISEASE WITHOUT ESOPHAGITIS: ICD-10-CM

## 2024-06-27 DIAGNOSIS — R14.0 ABDOMINAL DISTENSION (GASEOUS): ICD-10-CM

## 2024-06-27 DIAGNOSIS — R74.8 ABNORMAL LEVELS OF OTHER SERUM ENZYMES: ICD-10-CM

## 2024-06-27 DIAGNOSIS — R19.4 CHANGE IN BOWEL HABIT: ICD-10-CM

## 2024-06-27 PROCEDURE — 99214 OFFICE O/P EST MOD 30 MIN: CPT

## 2024-07-08 LAB
OVA AND PARASITE TRAVEL SCREEN: (no result)
OVA AND PARASITE TRAVEL SCREEN: REPORT DATE: (no result)
REPORT COMMENT: (no result)

## 2024-07-13 LAB
ALBUMIN: 3.9 G/DL (ref 3.5–5.7)
ANION GAP SERPL CALCULATED.3IONS-SCNC: 10 MMOL/L (ref 7–16)
BASOPHILS ABSOLUTE: 0 K/UL (ref 0–0.1)
BASOPHILS RELATIVE PERCENT: 0.3 %
BUN BLDV-MCNC: 26 MG/DL (ref 7–25)
CALCIUM SERPL-MCNC: 9.1 MG/DL (ref 8.6–10.2)
CHLORIDE BLD-SCNC: 99 MMOL/L (ref 98–107)
CO2: 28 MMOL/L (ref 21–31)
CREAT SERPL-MCNC: 1.22 MG/DL (ref 0.7–1.3)
EGFR MALE: 58 ML/MIN/1.73M2
EOSINOPHILS ABSOLUTE: 0.2 K/UL (ref 0–0.4)
EOSINOPHILS RELATIVE PERCENT: 3.4 %
GLUCOSE BLD-MCNC: 105 MG/DL (ref 74–109)
HCT VFR BLD CALC: 44.9 % (ref 39–51.5)
HEMOGLOBIN: 15 G/DL (ref 13.1–17.6)
LYMPHOCYTES ABSOLUTE: 1.5 K/UL (ref 0.8–3.6)
LYMPHOCYTES RELATIVE PERCENT: 21.6 %
MCH RBC QN AUTO: 29 PG (ref 28.4–33.4)
MCHC RBC AUTO-ENTMCNC: 33.4 G/DL (ref 31.1–37)
MCV RBC AUTO: 87 FL (ref 85–99)
MONOCYTES ABSOLUTE: 0.8 K/UL (ref 0.3–0.9)
MONOCYTES RELATIVE PERCENT: 11.7 %
NEUTROPHILS ABSOLUTE: 4.4 K/UL (ref 2–7.3)
NEUTROPHILS RELATIVE PERCENT: 63 %
PDW BLD-RTO: 14.2 % (ref 11.7–15.2)
PHOSPHORUS: 4.4 MG/DL (ref 2.5–5)
PLATELET # BLD: 162 K/UL (ref 154–393)
POTASSIUM SERPL-SCNC: 3.9 MMOL/L (ref 3.5–5.1)
RBC # BLD: 5.16 M/UL (ref 4.3–5.86)
SODIUM BLD-SCNC: 137 MMOL/L (ref 136–145)
WBC # BLD: 6.9 K/UL (ref 4–10.5)

## 2024-07-15 ENCOUNTER — OFFICE (OUTPATIENT)
Dept: URBAN - METROPOLITAN AREA CLINIC 18 | Facility: CLINIC | Age: 84
End: 2024-07-15
Payer: MEDICARE

## 2024-07-15 VITALS
DIASTOLIC BLOOD PRESSURE: 68 MMHG | HEIGHT: 64 IN | RESPIRATION RATE: 16 BRPM | HEART RATE: 70 BPM | SYSTOLIC BLOOD PRESSURE: 132 MMHG | WEIGHT: 169 LBS | OXYGEN SATURATION: 95 %

## 2024-07-15 DIAGNOSIS — R14.0 ABDOMINAL DISTENSION (GASEOUS): ICD-10-CM

## 2024-07-15 DIAGNOSIS — R93.5 ABNORMAL FINDINGS ON DIAGNOSTIC IMAGING OF OTHER ABDOMINAL R: ICD-10-CM

## 2024-07-15 DIAGNOSIS — R19.4 CHANGE IN BOWEL HABIT: ICD-10-CM

## 2024-07-15 PROCEDURE — 99213 OFFICE O/P EST LOW 20 MIN: CPT | Performed by: NURSE PRACTITIONER

## 2024-07-18 LAB
C DIFF SCREEN: C DIFFICILE SCREEN: NEGATIVE
CALPROTECTIN, STOOL: 21 MCG/G
CULTURE, STOOL: (no result)
CULTURE, STOOL: REPORT DATE: (no result)
PANCREATIC ELASTASE-1: >500 MCG/G
REPORT COMMENT: (no result)

## 2024-07-21 LAB
INFLAMMATORY BOWEL DISEASE DIFFERENTIATION PANEL: ANCA SCREEN: NEGATIVE
INFLAMMATORY BOWEL DISEASE DIFFERENTIATION PANEL: ANCA SCREEN: NEGATIVE
INFLAMMATORY BOWEL DISEASE DIFFERENTIATION PANEL: MYELOPEROXIDASE AB: <1 AI
INFLAMMATORY BOWEL DISEASE DIFFERENTIATION PANEL: MYELOPEROXIDASE AB: <1 AI
INFLAMMATORY BOWEL DISEASE DIFFERENTIATION PANEL: PROTEINASE-3 ANTIBODY: <1 AI
INFLAMMATORY BOWEL DISEASE DIFFERENTIATION PANEL: PROTEINASE-3 ANTIBODY: <1 AI
INFLAMMATORY BOWEL DISEASE DIFFERENTIATION PANEL: S. CEREVISIAE AB IGA: 3.5 U
INFLAMMATORY BOWEL DISEASE DIFFERENTIATION PANEL: S. CEREVISIAE AB IGA: 3.5 U
INFLAMMATORY BOWEL DISEASE DIFFERENTIATION PANEL: S. CEREVISIAE AB IGG: 31.4 U — HIGH
INFLAMMATORY BOWEL DISEASE DIFFERENTIATION PANEL: S. CEREVISIAE AB IGG: 31.4 U — HIGH

## 2024-10-25 ENCOUNTER — AMBULATORY SURGICAL CENTER (OUTPATIENT)
Dept: URBAN - METROPOLITAN AREA SURGERY 7 | Facility: SURGERY | Age: 84
End: 2024-10-25
Payer: MEDICARE

## 2024-10-25 VITALS
RESPIRATION RATE: 18 BRPM | SYSTOLIC BLOOD PRESSURE: 142 MMHG | RESPIRATION RATE: 14 BRPM | HEIGHT: 64 IN | DIASTOLIC BLOOD PRESSURE: 77 MMHG | DIASTOLIC BLOOD PRESSURE: 86 MMHG | RESPIRATION RATE: 17 BRPM | OXYGEN SATURATION: 99 % | HEART RATE: 69 BPM | HEART RATE: 67 BPM | OXYGEN SATURATION: 95 % | RESPIRATION RATE: 15 BRPM | DIASTOLIC BLOOD PRESSURE: 89 MMHG | WEIGHT: 182 LBS | RESPIRATION RATE: 12 BRPM | HEART RATE: 71 BPM | OXYGEN SATURATION: 97 % | SYSTOLIC BLOOD PRESSURE: 131 MMHG | SYSTOLIC BLOOD PRESSURE: 155 MMHG | SYSTOLIC BLOOD PRESSURE: 146 MMHG | OXYGEN SATURATION: 94 % | DIASTOLIC BLOOD PRESSURE: 92 MMHG | HEART RATE: 63 BPM | SYSTOLIC BLOOD PRESSURE: 145 MMHG | DIASTOLIC BLOOD PRESSURE: 80 MMHG | HEART RATE: 66 BPM | OXYGEN SATURATION: 96 % | SYSTOLIC BLOOD PRESSURE: 149 MMHG | DIASTOLIC BLOOD PRESSURE: 100 MMHG

## 2024-10-25 DIAGNOSIS — R13.10 DYSPHAGIA, UNSPECIFIED: ICD-10-CM

## 2024-10-25 DIAGNOSIS — R14.0 ABDOMINAL DISTENSION (GASEOUS): ICD-10-CM

## 2024-10-25 DIAGNOSIS — K22.2 ESOPHAGEAL OBSTRUCTION: ICD-10-CM

## 2024-10-25 DIAGNOSIS — K44.9 DIAPHRAGMATIC HERNIA WITHOUT OBSTRUCTION OR GANGRENE: ICD-10-CM

## 2024-10-25 PROCEDURE — 43248 EGD GUIDE WIRE INSERTION: CPT | Performed by: INTERNAL MEDICINE

## 2024-10-29 ENCOUNTER — OFFICE (OUTPATIENT)
Dept: URBAN - METROPOLITAN AREA CLINIC 18 | Facility: CLINIC | Age: 84
End: 2024-10-29
Payer: MEDICARE

## 2024-10-29 VITALS
DIASTOLIC BLOOD PRESSURE: 72 MMHG | SYSTOLIC BLOOD PRESSURE: 118 MMHG | HEART RATE: 80 BPM | WEIGHT: 182 LBS | HEIGHT: 64 IN

## 2024-10-29 DIAGNOSIS — K21.9 GASTRO-ESOPHAGEAL REFLUX DISEASE WITHOUT ESOPHAGITIS: ICD-10-CM

## 2024-10-29 DIAGNOSIS — R13.10 DYSPHAGIA, UNSPECIFIED: ICD-10-CM

## 2024-10-29 DIAGNOSIS — R14.0 ABDOMINAL DISTENSION (GASEOUS): ICD-10-CM

## 2024-10-29 DIAGNOSIS — K59.00 CONSTIPATION, UNSPECIFIED: ICD-10-CM

## 2024-10-29 PROCEDURE — 99213 OFFICE O/P EST LOW 20 MIN: CPT | Performed by: INTERNAL MEDICINE

## 2024-12-13 ENCOUNTER — OFFICE (OUTPATIENT)
Dept: URBAN - METROPOLITAN AREA CLINIC 16 | Facility: CLINIC | Age: 84
End: 2024-12-13
Payer: MEDICARE

## 2024-12-13 VITALS
SYSTOLIC BLOOD PRESSURE: 128 MMHG | HEIGHT: 64 IN | OXYGEN SATURATION: 98 % | DIASTOLIC BLOOD PRESSURE: 76 MMHG | WEIGHT: 184 LBS | HEART RATE: 80 BPM

## 2024-12-13 DIAGNOSIS — R19.4 CHANGE IN BOWEL HABIT: ICD-10-CM

## 2024-12-13 DIAGNOSIS — R14.0 ABDOMINAL DISTENSION (GASEOUS): ICD-10-CM

## 2024-12-13 DIAGNOSIS — K58.0 IRRITABLE BOWEL SYNDROME WITH DIARRHEA: ICD-10-CM

## 2024-12-13 PROCEDURE — 99213 OFFICE O/P EST LOW 20 MIN: CPT

## 2024-12-13 RX ORDER — RIFAXIMIN 550 MG/1
1650 TABLET ORAL
Qty: 42 | Refills: 2 | Status: ACTIVE
Start: 2024-12-13

## 2024-12-31 ENCOUNTER — OFFICE (OUTPATIENT)
Dept: URBAN - METROPOLITAN AREA CLINIC 18 | Age: 84
End: 2024-12-31
Payer: MEDICARE

## 2024-12-31 DIAGNOSIS — K21.9 GASTRO-ESOPHAGEAL REFLUX DISEASE WITHOUT ESOPHAGITIS: ICD-10-CM

## 2024-12-31 PROCEDURE — 99490 CHRNC CARE MGMT STAFF 1ST 20: CPT | Performed by: INTERNAL MEDICINE

## 2024-12-31 PROCEDURE — 99439 CHRNC CARE MGMT STAF EA ADDL: CPT | Performed by: INTERNAL MEDICINE

## 2025-01-31 ENCOUNTER — OFFICE (OUTPATIENT)
Dept: URBAN - METROPOLITAN AREA CLINIC 18 | Age: 85
End: 2025-01-31
Payer: MEDICARE

## 2025-01-31 VITALS
DIASTOLIC BLOOD PRESSURE: 72 MMHG | HEART RATE: 76 BPM | HEIGHT: 64 IN | WEIGHT: 174 LBS | SYSTOLIC BLOOD PRESSURE: 130 MMHG

## 2025-01-31 DIAGNOSIS — R19.4 CHANGE IN BOWEL HABIT: ICD-10-CM

## 2025-01-31 DIAGNOSIS — R14.0 ABDOMINAL DISTENSION (GASEOUS): ICD-10-CM

## 2025-01-31 PROCEDURE — 99213 OFFICE O/P EST LOW 20 MIN: CPT | Performed by: NURSE PRACTITIONER

## 2025-02-25 LAB
ALBUMIN: 3.7 G/DL (ref 3.5–5.7)
ALP BLD-CCNC: 59 U/L (ref 34–104)
ALT SERPL-CCNC: 23 U/L (ref 7–52)
AST SERPL-CCNC: 29 U/L (ref 13–39)
BILIRUB SERPL-MCNC: 0.7 MG/DL (ref 0.3–1)
BILIRUBIN DIRECT: 0.12 MG/DL
CHOLESTEROL, TOTAL: 140 MG/DL
HDLC SERPL-MCNC: 52 MG/DL (ref 40–60)
LDL CHOLESTEROL: 75 MG/DL (ref 0–99)
LIPASE: 40 U/L (ref 11–82)
PARTIAL THROMBOPLASTIN TIME MECHANICAL: 32.3 SECONDS (ref 27.2–38.8)
TOTAL PROTEIN: 5.6 G/DL (ref 6–8.3)
TRIGL SERPL-MCNC: 65 MG/DL
VLDLC SERPL CALC-MCNC: 13 MG/DL (ref 0–40)

## 2025-02-26 LAB
ALBUMIN: 3.7 G/DL (ref 3.5–5.7)
ANION GAP SERPL CALCULATED.3IONS-SCNC: 11 MMOL/L (ref 7–16)
BASOPHILS ABSOLUTE: 0 K/UL (ref 0–0.1)
BASOPHILS RELATIVE PERCENT: 0.2 %
BUN BLDV-MCNC: 23 MG/DL (ref 7–25)
CALCIUM SERPL-MCNC: 9.2 MG/DL (ref 8.6–10.2)
CHLORIDE BLD-SCNC: 105 MMOL/L (ref 98–107)
CO2: 23 MMOL/L (ref 21–31)
CREAT SERPL-MCNC: 0.98 MG/DL (ref 0.7–1.3)
EGFR MALE: 75 ML/MIN/1.73M2
EOSINOPHILS ABSOLUTE: 0 K/UL (ref 0–0.4)
EOSINOPHILS RELATIVE PERCENT: 0 %
GLUCOSE BLD-MCNC: 147 MG/DL (ref 74–109)
HCT VFR BLD CALC: 40.4 % (ref 39–51.5)
HEMOGLOBIN: 13.9 G/DL (ref 13.1–17.6)
LYMPHOCYTES ABSOLUTE: 0.9 K/UL (ref 0.8–3.6)
LYMPHOCYTES RELATIVE PERCENT: 9.1 %
MCH RBC QN AUTO: 28.9 PG (ref 28.4–33.4)
MCHC RBC AUTO-ENTMCNC: 34.4 G/DL (ref 31.1–37)
MCV RBC AUTO: 83.9 FL (ref 85–99)
MONOCYTES ABSOLUTE: 0.3 K/UL (ref 0.3–0.9)
MONOCYTES RELATIVE PERCENT: 3.3 %
NEUTROPHILS ABSOLUTE: 8.6 K/UL (ref 2–7.3)
NEUTROPHILS RELATIVE PERCENT: 87.4 %
PDW BLD-RTO: 15.2 % (ref 11.7–15.2)
PHOSPHORUS: 2.9 MG/DL (ref 2.5–5)
PLATELET # BLD: 158 K/UL (ref 154–393)
POTASSIUM SERPL-SCNC: 4 MMOL/L (ref 3.5–5.1)
RBC # BLD: 4.81 M/UL (ref 4.3–5.86)
SODIUM BLD-SCNC: 139 MMOL/L (ref 136–145)
TROPONIN I, HIGH SENSITIVITY: 6 PG/ML
WBC # BLD: 9.9 K/UL (ref 4–10.5)

## 2025-03-31 ENCOUNTER — OFFICE (OUTPATIENT)
Dept: URBAN - METROPOLITAN AREA CLINIC 18 | Age: 85
End: 2025-03-31
Payer: MEDICARE

## 2025-03-31 DIAGNOSIS — K21.9 GASTRO-ESOPHAGEAL REFLUX DISEASE WITHOUT ESOPHAGITIS: ICD-10-CM

## 2025-03-31 PROCEDURE — 99490 CHRNC CARE MGMT STAFF 1ST 20: CPT | Performed by: INTERNAL MEDICINE

## 2025-03-31 PROCEDURE — 99439 CHRNC CARE MGMT STAF EA ADDL: CPT | Performed by: INTERNAL MEDICINE

## 2025-04-29 ENCOUNTER — OFFICE (OUTPATIENT)
Dept: URBAN - METROPOLITAN AREA CLINIC 18 | Age: 85
End: 2025-04-29
Payer: MEDICARE

## 2025-04-29 VITALS — HEIGHT: 64 IN | DIASTOLIC BLOOD PRESSURE: 80 MMHG | WEIGHT: 174 LBS | SYSTOLIC BLOOD PRESSURE: 124 MMHG

## 2025-04-29 DIAGNOSIS — R14.0 ABDOMINAL DISTENSION (GASEOUS): ICD-10-CM

## 2025-04-29 DIAGNOSIS — K44.9 DIAPHRAGMATIC HERNIA WITHOUT OBSTRUCTION OR GANGRENE: ICD-10-CM

## 2025-04-29 DIAGNOSIS — K21.9 GASTRO-ESOPHAGEAL REFLUX DISEASE WITHOUT ESOPHAGITIS: ICD-10-CM

## 2025-04-29 PROCEDURE — 99213 OFFICE O/P EST LOW 20 MIN: CPT | Performed by: INTERNAL MEDICINE

## 2025-04-30 ENCOUNTER — OFFICE (OUTPATIENT)
Dept: URBAN - METROPOLITAN AREA CLINIC 18 | Age: 85
End: 2025-04-30
Payer: MEDICARE

## 2025-04-30 DIAGNOSIS — K21.9 GASTRO-ESOPHAGEAL REFLUX DISEASE WITHOUT ESOPHAGITIS: ICD-10-CM

## 2025-04-30 DIAGNOSIS — K58.0 IRRITABLE BOWEL SYNDROME WITH DIARRHEA: ICD-10-CM

## 2025-04-30 PROCEDURE — 99490 CHRNC CARE MGMT STAFF 1ST 20: CPT | Performed by: INTERNAL MEDICINE

## 2025-04-30 PROCEDURE — 99439 CHRNC CARE MGMT STAF EA ADDL: CPT | Performed by: INTERNAL MEDICINE

## 2025-05-29 ENCOUNTER — OFFICE (OUTPATIENT)
Dept: URBAN - METROPOLITAN AREA CLINIC 18 | Age: 85
End: 2025-05-29
Payer: MEDICARE

## 2025-05-29 VITALS
DIASTOLIC BLOOD PRESSURE: 80 MMHG | HEART RATE: 65 BPM | SYSTOLIC BLOOD PRESSURE: 120 MMHG | HEIGHT: 64 IN | OXYGEN SATURATION: 98 % | WEIGHT: 173 LBS

## 2025-05-29 DIAGNOSIS — R14.0 ABDOMINAL DISTENSION (GASEOUS): ICD-10-CM

## 2025-05-29 DIAGNOSIS — K21.9 GASTRO-ESOPHAGEAL REFLUX DISEASE WITHOUT ESOPHAGITIS: ICD-10-CM

## 2025-05-29 DIAGNOSIS — K44.9 DIAPHRAGMATIC HERNIA WITHOUT OBSTRUCTION OR GANGRENE: ICD-10-CM

## 2025-05-29 PROCEDURE — 99213 OFFICE O/P EST LOW 20 MIN: CPT | Performed by: HEALTH EDUCATOR

## 2025-07-03 LAB
HBA1C MFR BLD: 5.7 % (ref 4–6)
MEAN PLASMA GLUCOSE: 117 MG/DL (ref 68–126)
PARTIAL THROMBOPLASTIN TIME MECHANICAL: 30.4 SECONDS (ref 27.2–38.8)

## 2025-07-04 LAB
ALBUMIN: 3.8 G/DL (ref 3.5–5.7)
ANION GAP SERPL CALCULATED.3IONS-SCNC: 9 MMOL/L (ref 7–16)
B-TYPE NATRIURETIC PEPTIDE: 615 PG/ML
BASOPHILS ABSOLUTE: 0 K/UL (ref 0–0.1)
BASOPHILS RELATIVE PERCENT: 0.4 %
BUN BLDV-MCNC: 29 MG/DL (ref 7–25)
CALCIUM SERPL-MCNC: 8.6 MG/DL (ref 8.6–10.2)
CHLORIDE BLD-SCNC: 100 MMOL/L (ref 98–107)
CO2: 26 MMOL/L (ref 21–31)
CREAT SERPL-MCNC: 1.15 MG/DL (ref 0.7–1.3)
EGFR MALE: 62 ML/MIN/1.73M2
EOSINOPHILS ABSOLUTE: 0.1 K/UL (ref 0–0.4)
EOSINOPHILS RELATIVE PERCENT: 1.7 %
GLUCOSE BLD-MCNC: 112 MG/DL (ref 74–109)
HCT VFR BLD CALC: 37.6 % (ref 39–51.5)
HEMOGLOBIN: 13 G/DL (ref 13.1–17.6)
LYMPHOCYTES ABSOLUTE: 1.4 K/UL (ref 0.8–3.6)
LYMPHOCYTES RELATIVE PERCENT: 20.2 %
MCH RBC QN AUTO: 28.4 PG (ref 28.4–33.4)
MCHC RBC AUTO-ENTMCNC: 34.6 G/DL (ref 31.1–37)
MCV RBC AUTO: 82.1 FL (ref 85–99)
MONOCYTES ABSOLUTE: 1 K/UL (ref 0.3–0.9)
MONOCYTES RELATIVE PERCENT: 14.2 %
NEUTROPHILS ABSOLUTE: 4.5 K/UL (ref 2–7.3)
NEUTROPHILS RELATIVE PERCENT: 63.5 %
PDW BLD-RTO: 15.6 % (ref 11.7–15.2)
PHOSPHORUS: 4.1 MG/DL (ref 2.5–5)
PLATELET # BLD: 153 K/UL (ref 154–393)
POTASSIUM SERPL-SCNC: 3.6 MMOL/L (ref 3.5–5.1)
RBC # BLD: 4.58 M/UL (ref 4.3–5.86)
SODIUM BLD-SCNC: 135 MMOL/L (ref 136–145)
WBC # BLD: 7.1 K/UL (ref 4–10.5)

## 2025-07-31 ENCOUNTER — OFFICE (OUTPATIENT)
Dept: URBAN - METROPOLITAN AREA CLINIC 18 | Age: 85
End: 2025-07-31
Payer: MEDICARE

## 2025-07-31 DIAGNOSIS — K58.0 IRRITABLE BOWEL SYNDROME WITH DIARRHEA: ICD-10-CM

## 2025-07-31 DIAGNOSIS — K21.9 GASTRO-ESOPHAGEAL REFLUX DISEASE WITHOUT ESOPHAGITIS: ICD-10-CM

## 2025-07-31 PROCEDURE — 99490 CHRNC CARE MGMT STAFF 1ST 20: CPT | Performed by: HEALTH EDUCATOR

## 2025-07-31 PROCEDURE — 99439 CHRNC CARE MGMT STAF EA ADDL: CPT | Performed by: HEALTH EDUCATOR

## (undated) DEVICE — SNARE VASC L240CM LOOP W10MM SHTH DIA2.4MM RND STIFF CLD

## (undated) DEVICE — ENDOSCOPY KIT: Brand: MEDLINE INDUSTRIES, INC.

## (undated) DEVICE — DILATION SYRINGE: Brand: COOK

## (undated) DEVICE — HERCULES 3 STAGE BALLOON ESOPHAGEAL: Brand: HERCULES

## (undated) DEVICE — TUBING, SUCTION, 3/16" X 6', STRAIGHT: Brand: MEDLINE

## (undated) DEVICE — FORCEPS BX L240CM JAW DIA2.8MM L CAP W/ NDL MIC MESH TOOTH

## (undated) DEVICE — YANKAUER,FLEXIBLE HANDLE,REGLR CAPACITY: Brand: MEDLINE INDUSTRIES, INC.

## (undated) DEVICE — ESOPHAGEAL BALLOON DILATATION CATHETER: Brand: CRE FIXED WIRE

## (undated) DEVICE — JELLY LUBRICATING 3 GM BACTERIOSTATIC

## (undated) DEVICE — LINER SUCT CANSTR 1500CC SEMI RIG W/ POR HYDROPHOBIC SHUT

## (undated) DEVICE — SYRINGE INFL 60ML DISP ALLIANCE II

## (undated) DEVICE — TUBING, SUCTION, 9/32" X 10', STRAIGHT: Brand: MEDLINE

## (undated) DEVICE — Z DISCONTINUED (USE MFG CAT MVABO)  TUBING GAS SAMPLING STD 6.5 FT FEMALE CONN SMRT CAPNOLINE

## (undated) DEVICE — BRUSH CLN DIA5MM NYL SGL END CBL ASST FLEX DSTL TIP POLYPR

## (undated) DEVICE — CLIP LIG L235CM RESOL 360 BX/20